# Patient Record
Sex: FEMALE | Race: BLACK OR AFRICAN AMERICAN | NOT HISPANIC OR LATINO | Employment: FULL TIME | URBAN - METROPOLITAN AREA
[De-identification: names, ages, dates, MRNs, and addresses within clinical notes are randomized per-mention and may not be internally consistent; named-entity substitution may affect disease eponyms.]

---

## 2017-07-26 ENCOUNTER — TELEPHONE (OUTPATIENT)
Dept: PULMONOLOGY | Facility: HOSPICE | Age: 37
End: 2017-07-26

## 2017-07-26 DIAGNOSIS — R06.00 DYSPNEA, UNSPECIFIED TYPE: ICD-10-CM

## 2018-01-04 ENCOUNTER — HOSPITAL ENCOUNTER (OUTPATIENT)
Dept: RADIOLOGY | Facility: MEDICAL CENTER | Age: 38
End: 2018-01-04

## 2022-06-08 ENCOUNTER — TELEPHONE (OUTPATIENT)
Dept: SCHEDULING | Facility: IMAGING CENTER | Age: 42
End: 2022-06-08

## 2022-06-08 RX ORDER — FLUTICASONE PROPIONATE 50 MCG
1 SPRAY, SUSPENSION (ML) NASAL DAILY
COMMUNITY
Start: 2022-06-03 | End: 2022-06-09

## 2022-06-08 RX ORDER — FEXOFENADINE HCL 180 MG/1
180 TABLET ORAL DAILY
COMMUNITY
Start: 2022-06-03 | End: 2022-06-09

## 2022-06-08 RX ORDER — PREDNISONE 20 MG/1
TABLET ORAL
COMMUNITY
Start: 2022-02-21 | End: 2022-06-09

## 2022-06-08 RX ORDER — BENZONATATE 100 MG/1
100 CAPSULE ORAL
COMMUNITY
Start: 2022-05-08 | End: 2022-06-09

## 2022-06-08 RX ORDER — IRON,CARBONYL/ASCORBIC ACID 100-250 MG
TABLET ORAL
COMMUNITY
End: 2022-06-09

## 2022-06-08 RX ORDER — PHENAZOPYRIDINE HYDROCHLORIDE 200 MG/1
200 TABLET, FILM COATED ORAL
COMMUNITY
Start: 2022-05-08 | End: 2022-06-09

## 2022-06-08 RX ORDER — MONTELUKAST SODIUM 10 MG/1
10 TABLET ORAL DAILY
COMMUNITY
Start: 2022-03-09 | End: 2022-06-09

## 2022-06-09 ENCOUNTER — OFFICE VISIT (OUTPATIENT)
Dept: MEDICAL GROUP | Facility: MEDICAL CENTER | Age: 42
End: 2022-06-09
Attending: INTERNAL MEDICINE
Payer: MEDICAID

## 2022-06-09 VITALS
SYSTOLIC BLOOD PRESSURE: 128 MMHG | WEIGHT: 293 LBS | BODY MASS INDEX: 41.95 KG/M2 | TEMPERATURE: 97.9 F | HEIGHT: 70 IN | OXYGEN SATURATION: 96 % | HEART RATE: 84 BPM | DIASTOLIC BLOOD PRESSURE: 88 MMHG | RESPIRATION RATE: 20 BRPM

## 2022-06-09 DIAGNOSIS — Z91.09 ENVIRONMENTAL ALLERGIES: ICD-10-CM

## 2022-06-09 DIAGNOSIS — L73.2 HYDRADENITIS: ICD-10-CM

## 2022-06-09 DIAGNOSIS — F33.1 MODERATE EPISODE OF RECURRENT MAJOR DEPRESSIVE DISORDER (HCC): ICD-10-CM

## 2022-06-09 DIAGNOSIS — K21.9 GASTROESOPHAGEAL REFLUX DISEASE WITHOUT ESOPHAGITIS: ICD-10-CM

## 2022-06-09 DIAGNOSIS — E23.6 PITUITARY MASS (HCC): ICD-10-CM

## 2022-06-09 DIAGNOSIS — Z13.1 SCREENING FOR DIABETES MELLITUS: ICD-10-CM

## 2022-06-09 DIAGNOSIS — R06.02 SHORTNESS OF BREATH: ICD-10-CM

## 2022-06-09 DIAGNOSIS — Z13.220 SCREENING, LIPID: ICD-10-CM

## 2022-06-09 DIAGNOSIS — J45.40 MODERATE PERSISTENT ASTHMA WITHOUT COMPLICATION: ICD-10-CM

## 2022-06-09 DIAGNOSIS — D50.9 IRON DEFICIENCY ANEMIA, UNSPECIFIED IRON DEFICIENCY ANEMIA TYPE: ICD-10-CM

## 2022-06-09 PROBLEM — J32.9 CHRONIC SINUSITIS: Status: ACTIVE | Noted: 2022-03-09

## 2022-06-09 PROBLEM — I10 HYPERTENSION: Status: ACTIVE | Noted: 2022-03-24

## 2022-06-09 PROCEDURE — 99213 OFFICE O/P EST LOW 20 MIN: CPT | Performed by: INTERNAL MEDICINE

## 2022-06-09 PROCEDURE — 99204 OFFICE O/P NEW MOD 45 MIN: CPT | Performed by: INTERNAL MEDICINE

## 2022-06-09 RX ORDER — OMEPRAZOLE 20 MG/1
20 TABLET, DELAYED RELEASE ORAL DAILY
Qty: 28 TABLET | Refills: 3 | Status: SHIPPED | OUTPATIENT
Start: 2022-06-09 | End: 2022-06-30

## 2022-06-09 RX ORDER — AZELASTINE HYDROCHLORIDE 137 UG/1
SPRAY, METERED NASAL
COMMUNITY
Start: 2022-04-26 | End: 2022-06-09

## 2022-06-09 RX ORDER — IPRATROPIUM BROMIDE 21 UG/1
SPRAY, METERED NASAL
COMMUNITY
Start: 2022-04-26 | End: 2022-06-09

## 2022-06-09 RX ORDER — DEXAMETHASONE 4 MG/1
TABLET ORAL
COMMUNITY
Start: 2022-05-02 | End: 2022-06-09

## 2022-06-09 RX ORDER — NITROFURANTOIN MACROCRYSTALS 100 MG/1
CAPSULE ORAL
COMMUNITY
Start: 2022-05-08 | End: 2022-06-09

## 2022-06-09 RX ORDER — ALBUTEROL SULFATE 90 UG/1
AEROSOL, METERED RESPIRATORY (INHALATION)
COMMUNITY
End: 2022-06-09

## 2022-06-09 NOTE — ASSESSMENT & PLAN NOTE
"She is currently seeing an allergist and has seen numerous allergist in the past.  States that she has had skin testing done and was allergic to \"66 out of the 72 things they tested\" with large reactions that lasted for a week.  She is not currently on any antihistamine therapy.  She has been given Allegra, Singulair, and Flonase recently but she has decided not to take these medications.  "

## 2022-06-09 NOTE — LETTER
[email or web address]  Leticia Houser M.D.  [address]  [place] [address]  Fax: [phone]   Authorization for Release/Disclosure of   Protected Health Information   Name: ERNESTO RAMOS : 1980 SSN: [id number]   Address: [address]  [address]  [place] [address] Phone:    [phone] (home)    I authorize the entity listed below to release/disclose the PHI below to:   [facility]/Leticia Houser M.D. and Leticia Houser M.D.    [facility]    Address   City, State, [facility]   Phone:      Fax:     Reason for request: continuity of care   Information to be released:    [  ] LAST COLONOSCOPY,  including any PATH REPORT and follow-up  [  ] LAST FIT/COLOGUARD RESULT [  ] LAST DEXA  [  ] LAST MAMMOGRAM  [  ] LAST PAP  [  ] LAST LABS [  ] RETINA EXAM REPORT  [  ] IMMUNIZATION RECORDS  [  ] Release all info      [  ] Check here and initial the line next to each item to release ALL health information INCLUDING  _____ Care and treatment for drug and / or alcohol abuse  _____ HIV testing, infection status, or AIDS  _____ Genetic Testing    DATES OF SERVICE OR TIME PERIOD TO BE DISCLOSED: _____________  I understand and acknowledge that:  * This Authorization may be revoked at any time by you in writing, except if your health information has already been used or disclosed.  * Your health information that will be used or disclosed as a result of you signing this authorization could be re-disclosed by the recipient. If this occurs, your re-disclosed health information may no longer be protected by State or Federal laws.  * You may refuse to sign this Authorization. Your refusal will not affect your ability to obtain treatment.  * This Authorization becomes effective upon signing and will  on (date) __________.      If no date is indicated, this Authorization will  one (1) year from the signature date.    Name: Ernesto Ramos    Signature:   Date:     2022       PLEASE FAX REQUESTED RECORDS BACK TO:  [phone]

## 2022-06-09 NOTE — ASSESSMENT & PLAN NOTE
She recently had a CT head done at [place] on 6/3/2022 which showed a 2.5 cm mixed density mass within and expanding the sella.  Radiology recommended a pituitary protocol MRI for follow-up.  She states that she has had worsening headaches over the past 6 months.  She believes she had a CT scan done in [place] when the headaches first started 6 months ago and she was told there was no mass so she thinks symptoms have developed over that time course.  States that she has not had a menstrual cycle since February.  Reports 150 pounds of weight gain over the course of several years.

## 2022-06-09 NOTE — ASSESSMENT & PLAN NOTE
She reports a history of depression which has waxed and waned in terms of severity.  While she was in isolation during the pandemic it got very severe and she was having suicidal ideation on a regular basis.  She now denies this.  In the past, she has worked with a therapist but she has not seen anyone currently.  Her mother and grandmother both passed away in the last year and this has been especially hard on her.

## 2022-06-09 NOTE — PROGRESS NOTES
"Ernesto Ramos is a 41 y.o. female here for pituitary mass, breathing difficulty, est care  HPI:  [place] PCP out of state ([place])  Pituitary mass (HCC)  She recently had a CT head done at [place] on 6/3/2022 which showed a 2.5 cm mixed density mass within and expanding the sella.  Radiology recommended a pituitary protocol MRI for follow-up.  She states that she has had worsening headaches over the past 6 months.  She believes she had a CT scan done in [place] when the headaches first started 6 months ago and she was told there was no mass so she thinks symptoms have developed over that time course.  States that she has not had a menstrual cycle since February.  Reports 150 pounds of weight gain over the course of several years.    Environmental allergies  She is currently seeing an allergist and has seen numerous allergist in the past.  States that she has had skin testing done and was allergic to \"66 out of the 72 things they tested\" with large reactions that lasted for a week.  She is not currently on any antihistamine therapy.  She has been given Allegra, Singulair, and Flonase recently but she has decided not to take these medications.    Moderate episode of recurrent major depressive disorder (HCC)  She reports a history of depression which has waxed and waned in terms of severity.  While she was in isolation during the pandemic it got very severe and she was having suicidal ideation on a regular basis.  She now denies this.  In the past, she has worked with a therapist but she has not seen anyone currently.  Her mother and grandmother both passed away in the last year and this has been especially hard on her.    Moderate persistent asthma without complication  She reports that she was diagnosed with possible asthma approximately 10 years ago.  States that she stated an air B&B and was bit by some sort of insect.  It left a large welt on her neck and her knee.  About 4 days later she started to have " increasing shortness of breath.  Eventually she went to the emergency room and they told her she had asthma.  She had never had this diagnosis as a child and she wonders if it was related to the insect bite.  Since then she has had worsening trouble breathing.  She has seen numerous pulmonologist in various states, most recently through [place].  States that they prescribed her various inhalers but she does not get much relief.  Albuterol just makes her blood pressure go up and she feels jittery with it but she does not get improvement in breathing.  She is currently using Qvar.  She has had frequent exacerbations of her breathing requiring prednisone.  States that she has had pulmonary function tests checked on a variety of occasions.  She most recently had them done at [facility].  She is unsure of the results.  She does not feel like she has adequately responded to any inhaler she has tried although she does not remember all of the names.  She had hives with the last inhaler prescribed by her pulmonologist.  She has had chest x-rays but the last was done in January.  She has never had any more detailed lung imaging.  She denies fevers and chills but she has a productive cough most of the time.    Shortness of breath  She reports significant exertional shortness of breath.  See discussion of asthma below.    Hydradenitis  She reports a diagnosis of hidradenitis in the past.  She had surgery on the left axilla to have her sweat glands and skin removed years ago and she reports that her hidradenitis went into remission however she now has problems with lesions in the groin region.    Gastroesophageal reflux disease without esophagitis  She reports consistent heartburn especially at bedtime when she lays down.  She recently purchased some Mylanta but she has not been taking any antacids.  She denies nausea, vomiting, melena, hematochezia.    Current medicines (including changes today)  Current Outpatient  Medications   Medication Sig Dispense Refill   • omeprazole (PRILOSEC OTC) 20 MG tablet Take 1 Tablet by mouth every day. 28 Tablet 3   • TURMERIC CURCUMIN PO Take  by mouth.     • beclomethasone (QVAR) 80 MCG/ACT inhaler Inhale 1 Puff.       No current facility-administered medications for this visit.     She  has a past medical history of Allergy, Asthma, Chronic sinusitis, GERD (gastroesophageal reflux disease), Head ache, Hydradenitis, and Obesity.  She  has a past surgical history that includes other.  Social History     Tobacco Use   • Smoking status: Never Smoker   • Smokeless tobacco: Never Used   Vaping Use   • Vaping Use: Never used   Substance Use Topics   • Alcohol use: Not Currently   • Drug use: Never     Social History     Social History Narrative   • Not on file     Family History   Problem Relation Age of Onset   • Hypertension Mother    • Diabetes Mother    • Cancer Mother 40        thyroid   • Stroke Mother    • Hypertension Father    • Breast Cancer Sister 48   • Heart Disease Neg Hx           Objective:     Vitals:    06/09/22 0931   BP: 128/88   Pulse: 84   Resp: 20   Temp: 36.6 °C (97.9 °F)   SpO2: 96%     Body mass index is 58.69 kg/m².  Physical Exam:    Constitutional: Alert, no distress.  Skin: Warm, dry, good turgor, no rashes in visible areas.  Eye: Equal, round and reactive, conjunctiva clear, lids normal.  ENMT: Lips without lesions, good dentition, oropharynx clear, TM's clear bilaterally.  Neck: Trachea midline, no masses, no thyromegaly. No cervical or supraclavicular lymphadenopathy.  Respiratory: Unlabored respiratory effort, diffuse expiratory wheezes in all lung fields b/l  Cardiovascular: Regular rate and rhythm, no murmurs appreciated, no lower extremity edema.  Abdomen: Soft, obese, non-tender, no masses, no hepatosplenomegaly.  Psych: Alert and oriented x3, depressed mood, tearful during interview        Assessment and Plan:   The following treatment plan was discussed    1.  Pituitary mass (HCC)  New diagnosis for patient.  We will obtain an MRI pituitary protocol for further characterization.  We discussed blood testing below especially in light of her being amenorrheic for about 4 months.  - MR-BRAIN PITUITARY W/WO; Future  - FSH/LH; Future  - PROLACTIN; Future  - TSH WITH REFLEX TO FT4; Future  - Comp Metabolic Panel; Future    2. Hydradenitis  Did not discuss in detail today however per patient it is currently manageable.  We will continue to monitor.    3. Environmental allergies  Severe although she is not on treatment.  She continues to follow-up with her allergist.    4. Gastroesophageal reflux disease without esophagitis  Uncontrolled, will start on Prilosec as below  - omeprazole (PRILOSEC OTC) 20 MG tablet; Take 1 Tablet by mouth every day.  Dispense: 28 Tablet; Refill: 3    5. Shortness of breath  Unclear etiology.  She does have a lot of wheezing on exam however she has been treated as an asthmatic for 10 years and has not improved.  She does not report improvement to typical bronchodilator or corticosteroid therapy.  I think she would benefit from more detailed lung imaging to better characterize her diagnosis and make sure we are not missing something especially given the acute onset of the symptoms and the absence of a history of asthma in childhood.  - CT-CHEST, HIGH RESOLUTION LUNG; Future    6. Screening for diabetes mellitus  - HEMOGLOBIN A1C; Future    7. Screening, lipid  - Lipid Profile; Future    8. Moderate persistent asthma without complication  See discussion above.  She will continue with her Qvar for now.  - CT-CHEST, HIGH RESOLUTION LUNG; Future    9. Moderate episode of recurrent major depressive disorder (HCC)  Uncontrolled.  We briefly discussed referral back to therapy.  Plan to meet again in 2 to 3 weeks and discuss in more detail.        Followup: Return in about 3 weeks (around 6/30/2022) for review labs, imaging, depression, breathing.

## 2022-06-09 NOTE — ASSESSMENT & PLAN NOTE
She reports a diagnosis of hidradenitis in the past.  She had surgery on the left axilla to have her sweat glands and skin removed years ago and she reports that her hidradenitis went into remission however she now has problems with lesions in the groin region.

## 2022-06-09 NOTE — ASSESSMENT & PLAN NOTE
She reports that she was diagnosed with possible asthma approximately 10 years ago.  States that she stated an air B&B and was bit by some sort of insect.  It left a large welt on her neck and her knee.  About 4 days later she started to have increasing shortness of breath.  Eventually she went to the emergency room and they told her she had asthma.  She had never had this diagnosis as a child and she wonders if it was related to the insect bite.  Since then she has had worsening trouble breathing.  She has seen numerous pulmonologist in various states, most recently through [place].  States that they prescribed her various inhalers but she does not get much relief.  Albuterol just makes her blood pressure go up and she feels jittery with it but she does not get improvement in breathing.  She is currently using Qvar.  She has had frequent exacerbations of her breathing requiring prednisone.  States that she has had pulmonary function tests checked on a variety of occasions.  She most recently had them done at [facility].  She is unsure of the results.  She does not feel like she has adequately responded to any inhaler she has tried although she does not remember all of the names.  She had hives with the last inhaler prescribed by her pulmonologist.  She has had chest x-rays but the last was done in January.  She has never had any more detailed lung imaging.  She denies fevers and chills but she has a productive cough most of the time.

## 2022-06-09 NOTE — ASSESSMENT & PLAN NOTE
She reports consistent heartburn especially at bedtime when she lays down.  She recently purchased some Mylanta but she has not been taking any antacids.  She denies nausea, vomiting, melena, hematochezia.

## 2022-06-22 ENCOUNTER — HOSPITAL ENCOUNTER (OUTPATIENT)
Dept: LAB | Facility: MEDICAL CENTER | Age: 42
End: 2022-06-22
Attending: INTERNAL MEDICINE
Payer: MEDICAID

## 2022-06-22 DIAGNOSIS — E23.6 PITUITARY MASS (HCC): ICD-10-CM

## 2022-06-22 DIAGNOSIS — D50.9 IRON DEFICIENCY ANEMIA, UNSPECIFIED IRON DEFICIENCY ANEMIA TYPE: ICD-10-CM

## 2022-06-22 DIAGNOSIS — Z13.220 SCREENING, LIPID: ICD-10-CM

## 2022-06-22 DIAGNOSIS — Z13.1 SCREENING FOR DIABETES MELLITUS: ICD-10-CM

## 2022-06-22 LAB
ALBUMIN SERPL BCP-MCNC: 4.5 G/DL (ref 3.2–4.9)
ALBUMIN/GLOB SERPL: 1.2 G/DL
ALP SERPL-CCNC: 98 U/L (ref 30–99)
ALT SERPL-CCNC: 14 U/L (ref 2–50)
ANION GAP SERPL CALC-SCNC: 15 MMOL/L (ref 7–16)
AST SERPL-CCNC: 20 U/L (ref 12–45)
BASOPHILS # BLD AUTO: 0.7 % (ref 0–1.8)
BASOPHILS # BLD: 0.06 K/UL (ref 0–0.12)
BILIRUB SERPL-MCNC: 0.5 MG/DL (ref 0.1–1.5)
BUN SERPL-MCNC: 13 MG/DL (ref 8–22)
CALCIUM SERPL-MCNC: 9.5 MG/DL (ref 8.5–10.5)
CHLORIDE SERPL-SCNC: 99 MMOL/L (ref 96–112)
CHOLEST SERPL-MCNC: 159 MG/DL (ref 100–199)
CO2 SERPL-SCNC: 24 MMOL/L (ref 20–33)
CREAT SERPL-MCNC: 1.16 MG/DL (ref 0.5–1.4)
EOSINOPHIL # BLD AUTO: 0.72 K/UL (ref 0–0.51)
EOSINOPHIL NFR BLD: 8.7 % (ref 0–6.9)
ERYTHROCYTE [DISTWIDTH] IN BLOOD BY AUTOMATED COUNT: 50.8 FL (ref 35.9–50)
EST. AVERAGE GLUCOSE BLD GHB EST-MCNC: 126 MG/DL
FERRITIN SERPL-MCNC: 51.9 NG/ML (ref 10–291)
FSH SERPL-ACNC: 4.8 MIU/ML
GFR SERPLBLD CREATININE-BSD FMLA CKD-EPI: 60 ML/MIN/1.73 M 2
GLOBULIN SER CALC-MCNC: 3.7 G/DL (ref 1.9–3.5)
GLUCOSE SERPL-MCNC: 105 MG/DL (ref 65–99)
HBA1C MFR BLD: 6 % (ref 4–5.6)
HCT VFR BLD AUTO: 42.7 % (ref 37–47)
HDLC SERPL-MCNC: 66 MG/DL
HGB BLD-MCNC: 14.5 G/DL (ref 12–16)
IMM GRANULOCYTES # BLD AUTO: 0.04 K/UL (ref 0–0.11)
IMM GRANULOCYTES NFR BLD AUTO: 0.5 % (ref 0–0.9)
IRON SATN MFR SERPL: 23 % (ref 15–55)
IRON SERPL-MCNC: 72 UG/DL (ref 40–170)
LDLC SERPL CALC-MCNC: 84 MG/DL
LH SERPL-ACNC: 2.1 IU/L
LYMPHOCYTES # BLD AUTO: 2.23 K/UL (ref 1–4.8)
LYMPHOCYTES NFR BLD: 26.9 % (ref 22–41)
MCH RBC QN AUTO: 27.8 PG (ref 27–33)
MCHC RBC AUTO-ENTMCNC: 34 G/DL (ref 33.6–35)
MCV RBC AUTO: 81.8 FL (ref 81.4–97.8)
MONOCYTES # BLD AUTO: 0.52 K/UL (ref 0–0.85)
MONOCYTES NFR BLD AUTO: 6.3 % (ref 0–13.4)
NEUTROPHILS # BLD AUTO: 4.72 K/UL (ref 2–7.15)
NEUTROPHILS NFR BLD: 56.9 % (ref 44–72)
NRBC # BLD AUTO: 0 K/UL
NRBC BLD-RTO: 0 /100 WBC
PLATELET # BLD AUTO: 343 K/UL (ref 164–446)
PMV BLD AUTO: 10.8 FL (ref 9–12.9)
POTASSIUM SERPL-SCNC: 3.8 MMOL/L (ref 3.6–5.5)
PROLACTIN SERPL-MCNC: 40.2 NG/ML (ref 2.8–26)
PROT SERPL-MCNC: 8.2 G/DL (ref 6–8.2)
RBC # BLD AUTO: 5.22 M/UL (ref 4.2–5.4)
SODIUM SERPL-SCNC: 138 MMOL/L (ref 135–145)
TIBC SERPL-MCNC: 313 UG/DL (ref 250–450)
TRIGL SERPL-MCNC: 46 MG/DL (ref 0–149)
TSH SERPL DL<=0.005 MIU/L-ACNC: 2.93 UIU/ML (ref 0.38–5.33)
UIBC SERPL-MCNC: 241 UG/DL (ref 110–370)
WBC # BLD AUTO: 8.3 K/UL (ref 4.8–10.8)

## 2022-06-22 PROCEDURE — 83550 IRON BINDING TEST: CPT

## 2022-06-22 PROCEDURE — 83001 ASSAY OF GONADOTROPIN (FSH): CPT

## 2022-06-22 PROCEDURE — 36415 COLL VENOUS BLD VENIPUNCTURE: CPT

## 2022-06-22 PROCEDURE — 83540 ASSAY OF IRON: CPT

## 2022-06-22 PROCEDURE — 80053 COMPREHEN METABOLIC PANEL: CPT

## 2022-06-22 PROCEDURE — 84443 ASSAY THYROID STIM HORMONE: CPT

## 2022-06-22 PROCEDURE — 83036 HEMOGLOBIN GLYCOSYLATED A1C: CPT

## 2022-06-22 PROCEDURE — 80061 LIPID PANEL: CPT

## 2022-06-22 PROCEDURE — 82728 ASSAY OF FERRITIN: CPT

## 2022-06-22 PROCEDURE — 83002 ASSAY OF GONADOTROPIN (LH): CPT

## 2022-06-22 PROCEDURE — 84146 ASSAY OF PROLACTIN: CPT

## 2022-06-22 PROCEDURE — 85025 COMPLETE CBC W/AUTO DIFF WBC: CPT

## 2022-06-23 DIAGNOSIS — D35.2 PROLACTINOMA (HCC): ICD-10-CM

## 2022-06-26 ENCOUNTER — E-CONSULT (OUTPATIENT)
Dept: ENDOCRINOLOGY | Facility: MEDICAL CENTER | Age: 42
End: 2022-06-26
Payer: MEDICAID

## 2022-06-26 DIAGNOSIS — E03.8 SECONDARY HYPOTHYROIDISM: ICD-10-CM

## 2022-06-26 DIAGNOSIS — Z71.9 ENCOUNTER FOR CONSULTATION: ICD-10-CM

## 2022-06-26 DIAGNOSIS — E23.6 PITUITARY MASS (HCC): ICD-10-CM

## 2022-06-27 NOTE — PROGRESS NOTES
E-Consult Response     After careful review of the patient's information available in the medical record, the following are my findings and recommendations:    Reason for consult: Recommendations for patient with hyperprolactinemia    Summary of data reviewed: In summary this is a 41-year-old female born on July 20, 1980    According to the EP records from June 9, 2022 the patient has a 2.5 cm mixed density mass expanding within the sella.  She has not had a pituitary MRI.  She has had headaches for the past 6 months.      Her labs on June 21, 2022 show a FSH of 4.8 and LH of 2.1 prolactin of 40.0 TSH of 2.9.      Recommendations: Strongly recommend getting pituitary MRI soon as possible.    The prolactin elevation is most likely secondary to mass-effect on the pituitary stalk thereby disrupting the physiologic hypothalamic released - dopamine inhibition of prolactin secretion AKA stalk effect.    The patient likely does not have a prolactinoma as typically with prolactinomas there is a close correlation between the size of the prolactin producing tumor and actual prolactin levels.      In this case patient reportedly had a 2.5 cm pituitary mass but the prolactin is minimally elevated at 40.  Correlation between the 2 data points is poor and again most likely the prolactin elevation is due to to stalk effect.  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    As such treatment of a dopamine agonist is not recommended.  Further work-up is recommended for a pituitary mass measuring 2.5 cm.        Recommend measuring ACTH and cortisol, free T4, IGF-I, concomitant LH and FSH and estradiol levels for this female patient.  Also recommend obtaining 24 urine cortisol and 24 urine creatinine.    Again recommend pituitary dedicated MRI, recommend visual field testing.  This patient need pituitary surgery if there is evidence of optic chiasm impingement.  Again do  not recommend dopamine agonist therapy as there is a logical explanation for the mild prolactin elevation.  Ultimately this will be proven by the results of imaging if there is evidence of deviation of the pituitary stalk caused by the pituitary tumor.          E-Consult Time: 31 minutes were spent with >50% of the total time spent discussing plan of care with requesting physician (Use code [id number])    Norman Berman M.D.

## 2022-06-29 ENCOUNTER — HOSPITAL ENCOUNTER (OUTPATIENT)
Dept: RADIOLOGY | Facility: MEDICAL CENTER | Age: 42
End: 2022-06-29
Attending: INTERNAL MEDICINE
Payer: MEDICAID

## 2022-06-29 DIAGNOSIS — R06.02 SHORTNESS OF BREATH: ICD-10-CM

## 2022-06-29 DIAGNOSIS — J45.40 MODERATE PERSISTENT ASTHMA WITHOUT COMPLICATION: ICD-10-CM

## 2022-06-29 DIAGNOSIS — E23.6 PITUITARY MASS (HCC): ICD-10-CM

## 2022-06-29 PROCEDURE — A9576 INJ PROHANCE MULTIPACK: HCPCS | Performed by: INTERNAL MEDICINE

## 2022-06-29 PROCEDURE — 71250 CT THORAX DX C-: CPT

## 2022-06-29 PROCEDURE — 70553 MRI BRAIN STEM W/O & W/DYE: CPT

## 2022-06-29 PROCEDURE — 700117 HCHG RX CONTRAST REV CODE 255: Performed by: INTERNAL MEDICINE

## 2022-06-29 RX ADMIN — GADOTERIDOL 30 ML: 279.3 INJECTION, SOLUTION INTRAVENOUS at 11:07

## 2022-06-30 ENCOUNTER — OFFICE VISIT (OUTPATIENT)
Dept: MEDICAL GROUP | Facility: MEDICAL CENTER | Age: 42
End: 2022-06-30
Attending: INTERNAL MEDICINE
Payer: MEDICAID

## 2022-06-30 ENCOUNTER — HOSPITAL ENCOUNTER (OUTPATIENT)
Dept: LAB | Facility: MEDICAL CENTER | Age: 42
End: 2022-06-30
Attending: INTERNAL MEDICINE
Payer: MEDICAID

## 2022-06-30 VITALS
WEIGHT: 293 LBS | HEART RATE: 86 BPM | BODY MASS INDEX: 41.95 KG/M2 | RESPIRATION RATE: 16 BRPM | HEIGHT: 70 IN | SYSTOLIC BLOOD PRESSURE: 144 MMHG | DIASTOLIC BLOOD PRESSURE: 90 MMHG | TEMPERATURE: 97.1 F | OXYGEN SATURATION: 98 %

## 2022-06-30 DIAGNOSIS — R06.02 SHORTNESS OF BREATH: ICD-10-CM

## 2022-06-30 DIAGNOSIS — E23.6 PITUITARY MASS (HCC): ICD-10-CM

## 2022-06-30 DIAGNOSIS — D72.10 EOSINOPHILIA, UNSPECIFIED TYPE: ICD-10-CM

## 2022-06-30 DIAGNOSIS — R09.89 PULMONARY AIR TRAPPING: ICD-10-CM

## 2022-06-30 DIAGNOSIS — R06.2 WHEEZING: ICD-10-CM

## 2022-06-30 PROBLEM — D35.2 PROLACTINOMA (HCC): Status: RESOLVED | Noted: 2022-06-23 | Resolved: 2022-06-30

## 2022-06-30 LAB
CORTIS SERPL-MCNC: 11 UG/DL (ref 0–23)
ESTRADIOL SERPL-MCNC: 31.7 PG/ML
FSH SERPL-ACNC: 4.6 MIU/ML
LH SERPL-ACNC: 2.4 IU/L
T4 FREE SERPL-MCNC: 0.55 NG/DL (ref 0.93–1.7)

## 2022-06-30 PROCEDURE — 82024 ASSAY OF ACTH: CPT

## 2022-06-30 PROCEDURE — 99213 OFFICE O/P EST LOW 20 MIN: CPT | Performed by: INTERNAL MEDICINE

## 2022-06-30 PROCEDURE — 82533 TOTAL CORTISOL: CPT

## 2022-06-30 PROCEDURE — 82670 ASSAY OF TOTAL ESTRADIOL: CPT

## 2022-06-30 PROCEDURE — 83001 ASSAY OF GONADOTROPIN (FSH): CPT

## 2022-06-30 PROCEDURE — 83002 ASSAY OF GONADOTROPIN (LH): CPT

## 2022-06-30 PROCEDURE — 86036 ANCA SCREEN EACH ANTIBODY: CPT

## 2022-06-30 PROCEDURE — 84439 ASSAY OF FREE THYROXINE: CPT

## 2022-06-30 PROCEDURE — 99214 OFFICE O/P EST MOD 30 MIN: CPT | Performed by: INTERNAL MEDICINE

## 2022-06-30 PROCEDURE — 84305 ASSAY OF SOMATOMEDIN: CPT

## 2022-06-30 PROCEDURE — 36415 COLL VENOUS BLD VENIPUNCTURE: CPT

## 2022-06-30 PROCEDURE — 82103 ALPHA-1-ANTITRYPSIN TOTAL: CPT

## 2022-06-30 PROCEDURE — 83516 IMMUNOASSAY NONANTIBODY: CPT

## 2022-06-30 RX ORDER — TIOTROPIUM BROMIDE INHALATION SPRAY 3.12 UG/1
5 SPRAY, METERED RESPIRATORY (INHALATION) DAILY
Qty: 4 G | Refills: 2 | Status: ON HOLD | OUTPATIENT
Start: 2022-06-30 | End: 2022-08-11

## 2022-06-30 RX ORDER — PREDNISONE 20 MG/1
TABLET ORAL
Qty: 11 TABLET | Refills: 0 | Status: ON HOLD | OUTPATIENT
Start: 2022-06-30 | End: 2022-08-11

## 2022-06-30 ASSESSMENT — FIBROSIS 4 INDEX: FIB4 SCORE: 0.67

## 2022-06-30 NOTE — ASSESSMENT & PLAN NOTE
She presents today for follow-up of her imaging studies.  We discussed the results of her MRI pituitary.  She states that she has had intermittent episodes of blurred vision that come and go starting back in December but they were also associated with a lot of stress.  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  We reviewed recommendations by Dr. Betancourt of endocrinology for additional labs as well as potentially consultation with neurosurgeon and the importance of visual field testing.  She states that she has thought a lot about having the mass removed and she would like to proceed with this.

## 2022-06-30 NOTE — ASSESSMENT & PLAN NOTE
Her labs show eosinophilia however she has severe allergies.  Given her difficulty breathing, we also discussed the possibility of Wegener's granulomatosis although I think this is unlikely, as well as sarcoidosis.  In the setting of her normal imaging recently, pulmonary sarcoidosis is very unlikely.  We have requested records from her allergy provider ([facility]).

## 2022-06-30 NOTE — PROGRESS NOTES
Subjective:   Ernesto Ramos is a 41 y.o. female here today for f/u results    Pituitary mass (HCC)  She presents today for follow-up of her imaging studies.  We discussed the results of her MRI pituitary.  She states that she has had intermittent episodes of blurred vision that come and go starting back in December but they were also associated with a lot of stress.  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  We reviewed recommendations by Dr. Betancourt of endocrinology for additional labs as well as potentially consultation with neurosurgeon and the importance of visual field testing.  She states that she has thought a lot about having the mass removed and she would like to proceed with this.    Pulmonary air trapping  She continues to report significant difficulty with breathing and wheezing.  She had a high-resolution CT of the lungs done which showed some pulmonary air trapping but was otherwise unremarkable.  She feels like her symptoms are worsening and she is having more trouble breathing again and she is requesting another round of steroids.  Of note, she reports trying Trelegy which caused her hives and also Advair which caused her multiple joint pain.  Albuterol has not been very effective for her.    Eosinophilia  Her labs show eosinophilia however she has severe allergies.  Given her difficulty breathing, we also discussed the possibility of Wegener's granulomatosis although I think this is unlikely, as well as sarcoidosis.  In the setting of her normal imaging recently, pulmonary sarcoidosis is very unlikely.  We have requested records from her allergy provider ([facility]).       Current medicines (including changes today)  Current Outpatient Medications   Medication Sig Dispense Refill   • tiotropium (SPIRIVA RESPIMAT) 2.5 mcg/Act Aero Soln Inhale 2 Inhalation every day. 4 g 2   • predniSONE  (DELTASONE) 20 MG Tab 2 tabs PO daily x 3 days then 1 tab PO daily x 3 days then 1/2 tab PO daily x 3 days 11 Tablet 0   • TURMERIC CURCUMIN PO Take  by mouth.       No current facility-administered medications for this visit.     She  has a past medical history of Allergy, Asthma, Chronic sinusitis, GERD (gastroesophageal reflux disease), Head ache, Hydradenitis, and Obesity.         Objective:     Vitals:    06/30/22 0831   BP: (!) 144/90   Pulse: 86   Resp: 16   Temp: 36.2 °C (97.1 °F)   SpO2: 98%     Body mass index is 58.83 kg/m².   Physical Exam:  Constitutional: Alert, no distress.  Skin: Warm, dry, good turgor, no rashes in visible areas.  Eye: Equal, round and reactive, conjunctiva clear, lids normal.  Psych: Alert and oriented x3, normal affect and mood.      Results and Imaging:   Hospital Outpatient Visit on 06/22/2022   Component Date Value Ref Range Status   • Luteinizing Hormone 06/22/2022 2.1  IU/L Final    Comment: LUTEINIZING HORMONE REFERENCE RANGES:  Female               Male  >17 yrs                                      1.7 - 8.6 IU/L  Follicular             2.4 - 12.6 IU/L  Mid-Cycle             14.0 - 95.6 IU/L  Luteal                 1.0 - 11.4 IU/L  Postmenopausal         7.7 - 58.5 IU/L  -----------------------------------------------------------  Jorge Stage I:         0.0 - 9.3  IU/L      0.0 - 1.0 IU/L  Jorge Stage II:        0.0 - 16.0 IU/L      0.0 - 3.6 IU/L  Jorge Stage III:       0.0 - 23.0 IU/L      0.2 - 6.4 IU/L  Jorge Stage IV:        0.0 - 19.1 IU/L      0.9 - 8.3 IU/L     • Follicle Stimulating Hormone 06/22/2022 4.8  mIU/mL Final    Comment: FOLLICULAR STIMULATING HORMONE REFERENCE RANGE  Female               Male  >17 yrs  Follicular             3.5 - 12.5  Mid-Cycle              4.7 - 21.5  Luteal                 1.7 - 7.7  Postmenopausal         25.8 - 134.8  ------------------------------------------------------  Jorge Stage I:         0.6 - 8.4           0.3 -  2.9  Jorge Stage II:        0.6 - 8.9           0.5 - 4.8  Jorge Stage III:       0.5 - 8.9           1.0 - 6.4  Jorge Stage IV:        0.7 - 9.3           1.0 - 8.1     • Prolactin 06/22/2022 40.20 (A) 2.80 - 26.00 ng/mL Final   • TSH 06/22/2022 2.930  0.380 - 5.330 uIU/mL Final    Comment: Reference Range:    Pregnant Females, 1st Trimester  0.050-3.700  Pregnant Females, 2nd Trimester  0.310-4.350  Pregnant Females, 3rd Trimester  0.410-5.180     • Glycohemoglobin 06/22/2022 6.0 (A) 4.0 - 5.6 % Final    Comment: Increased risk for diabetes:  5.7 -6.4%  Diabetes:  >6.4%  Glycemic control for adults with diabetes:  <7.0%    The above interpretations are per ADA guidelines.  Diagnosis  of diabetes mellitus on the basis of elevated Hemoglobin A1c  should be confirmed by repeating the Hb A1c test.     • Est Avg Glucose 06/22/2022 126  mg/dL Final    Comment: The eAG calculation is based on the A1c-Derived Daily Glucose  (ADAG) study.  See the ADA's website for additional information.     • Cholesterol,Tot 06/22/2022 159  100 - 199 mg/dL Final   • Triglycerides 06/22/2022 46  0 - 149 mg/dL Final   • HDL 06/22/2022 66  >=40 mg/dL Final   • LDL 06/22/2022 84  <100 mg/dL Final   • Sodium 06/22/2022 138  135 - 145 mmol/L Final   • Potassium 06/22/2022 3.8  3.6 - 5.5 mmol/L Final   • Chloride 06/22/2022 99  96 - 112 mmol/L Final   • Co2 06/22/2022 24  20 - 33 mmol/L Final   • Anion Gap 06/22/2022 15.0  7.0 - 16.0 Final   • Glucose 06/22/2022 105 (A) 65 - 99 mg/dL Final   • Bun 06/22/2022 13  8 - 22 mg/dL Final   • Creatinine 06/22/2022 1.16  0.50 - 1.40 mg/dL Final   • Calcium 06/22/2022 9.5  8.5 - 10.5 mg/dL Final   • AST(SGOT) 06/22/2022 20  12 - 45 U/L Final   • ALT(SGPT) 06/22/2022 14  2 - 50 U/L Final   • Alkaline Phosphatase 06/22/2022 98  30 - 99 U/L Final   • Total Bilirubin 06/22/2022 0.5  0.1 - 1.5 mg/dL Final   • Albumin 06/22/2022 4.5  3.2 - 4.9 g/dL Final   • Total Protein 06/22/2022 8.2  6.0 - 8.2 g/dL  Final   • Globulin 06/22/2022 3.7 (A) 1.9 - 3.5 g/dL Final   • A-G Ratio 06/22/2022 1.2  g/dL Final   • WBC 06/22/2022 8.3  4.8 - 10.8 K/uL Final   • RBC 06/22/2022 5.22  4.20 - 5.40 M/uL Final   • Hemoglobin 06/22/2022 14.5  12.0 - 16.0 g/dL Final   • Hematocrit 06/22/2022 42.7  37.0 - 47.0 % Final   • MCV 06/22/2022 81.8  81.4 - 97.8 fL Final   • MCH 06/22/2022 27.8  27.0 - 33.0 pg Final   • MCHC 06/22/2022 34.0  33.6 - 35.0 g/dL Final   • RDW 06/22/2022 50.8 (A) 35.9 - 50.0 fL Final   • Platelet Count 06/22/2022 343  164 - 446 K/uL Final   • MPV 06/22/2022 10.8  9.0 - 12.9 fL Final   • Neutrophils-Polys 06/22/2022 56.90  44.00 - 72.00 % Final   • Lymphocytes 06/22/2022 26.90  22.00 - 41.00 % Final   • Monocytes 06/22/2022 6.30  0.00 - 13.40 % Final   • Eosinophils 06/22/2022 8.70 (A) 0.00 - 6.90 % Final   • Basophils 06/22/2022 0.70  0.00 - 1.80 % Final   • Immature Granulocytes 06/22/2022 0.50  0.00 - 0.90 % Final   • Nucleated RBC 06/22/2022 0.00  /100 WBC Final   • Neutrophils (Absolute) 06/22/2022 4.72  2.00 - 7.15 K/uL Final    Includes immature neutrophils, if present.   • Lymphs (Absolute) 06/22/2022 2.23  1.00 - 4.80 K/uL Final   • Monos (Absolute) 06/22/2022 0.52  0.00 - 0.85 K/uL Final   • Eos (Absolute) 06/22/2022 0.72 (A) 0.00 - 0.51 K/uL Final   • Baso (Absolute) 06/22/2022 0.06  0.00 - 0.12 K/uL Final   • Immature Granulocytes (abs) 06/22/2022 0.04  0.00 - 0.11 K/uL Final   • NRBC (Absolute) 06/22/2022 0.00  K/uL Final   • Iron 06/22/2022 72  40 - 170 ug/dL Final   • Total Iron Binding 06/22/2022 313  250 - 450 ug/dL Final   • Unsat Iron Binding 06/22/2022 241  110 - 370 ug/dL Final   • % Saturation 06/22/2022 23  15 - 55 % Final   • Ferritin 06/22/2022 51.9  10.0 - 291.0 ng/mL Final   • GFR (CKD-EPI) 06/22/2022 60  >60 mL/min/1.73 m 2 Final    Comment: Effective 3/15/2022, estimated Glomerular Filtration Rate  is calculated using race neutral CKD-EPI 2021 equation  per NKF-ASN recommendations.        CT high resolution chest (6/29/22)  FINDINGS:  Lung volumes: Normal.     Lung parenchyma: No consolidation. No nodules or masses. No bronchiectasis or honeycombing. No architectural distortion. No reticulation. There are patchy areas of air trapping on expiratory images.     Pleura: No pleural fluid is identified.     Mediastinum and lymph nodes: No adenopathy is identified. Calcifications: Absent  Esophagus: Dilatation Absent. Wall thickening Absent Hiatal hernia Absent     Cardiac/vascular: Pulmonary artery: Main size 2.1 cm.  Aorta: Ascending aorta 3 cm. Calcifications: None  Coronary calcifications: None  Cardiac chambers: No significant chamber enlargement.  Pericardium: Small pericardial effusion.     Additional findings: Limited visualization of the upper abdomen is grossly unremarkable.     Mild degenerative changes are in the thoracic spine. No suspicious bony lesions.     IMPRESSION:     1.  Mild-to-moderate air trapping on expiratory images. Exam is otherwise unremarkable    MRI pituitary (6/29/22)  FINDINGS:  There is a 2.6 x 2 cm thin-walled cystic mass arising in and expanding the pituitary fossa. This mass extends suprasellar cistern and abuts the optic chiasm. Additionally this mass extends anteriorly into the right side of the sphenoid sinus. There is a   rim of irregular enhancing soft tissue in the base of this lesion. Additionally there is a hypointense fluid fluid level dependently in the cystic lesion. This cystic lesion is predominantly T1 hyperintense.     The cavernous sinuses show normal enhancement and configuration. Vascular flow voids in the juxtasellar carotid arteries are unremarkable.     The images of the whole brain show the calvaria to be unremarkable. There are no extraaxial fluid collections. There are no areas of abnormal signal or enhancement in the brain parenchyma. There are no mass effects or shift of midline structures. There   are no hemorrhagic lesions.     The  brainstem and posterior fossa structures are unremarkable.     Vascular flow voids in the vertebrobasilar and carotid arteries, [place], and dural venous sinuses are intact.     The paranasal sinuses moderate mucosal inflammatory changes in the ethmoid sinuses. There is small polypoid mucosal inflammatory change noted in the left maxillary sinus.     IMPRESSION:        1.  Large cystic pituitary macroadenoma expanding the pituitary fossa abutting the optic chiasm and extending into the right side of the sphenoid sinus likely a prolactinoma.     2.  Moderate mucosal inflammatory changes in the ethmoid and left maxillary sinuses.      Assessment and Plan:   The following treatment plan was discussed    1. Pituitary mass (HCC)  We discussed obtaining additional testing as recommended by endocrinology E consult.  I have also placed a referral to ophthalmology for visual field testing and neurosurgery to discuss removal.  We discussed that if any of her additional labs come back abnormal, I will place a referral to endocrinology at Dr. Betancourt's request.  - ACTH; Future  - CORTISOL; Future  - URINE CORTISOL 24 HR, ICMA; Future  - URINE CREATININE 24 HR; Future  - FREE THYROXINE; Future  - IGF-1  - FSH/LH; Future  - ESTRADIOL; Future  - Referral to Neurosurgery  - Referral to Ophthalmology    2. Shortness of breath  There is air trapping seen on her high-resolution CT but no obvious intrinsic lung disease.  We discussed a trial of Spiriva.  Because she feels like her breathing is significantly worse, I have done a prednisone taper for her as well but instructed her not to start until she does her pituitary labs so as not to affect her cortisol/ACTH.  Also discussed evaluating for alpha 1 antitrypsin deficiency and Wegener's granulomatosis  - ALPHA-1-ANTITRYPSIN; Future  - tiotropium (SPIRIVA RESPIMAT) 2.5 mcg/Act Aero Soln; Inhale 2 Inhalation every day.  Dispense: 4 g; Refill: 2  - ANTI-NEUTROPHIL CYTOPLASMIC  AB  - predniSONE (DELTASONE) 20 MG Tab; 2 tabs PO daily x 3 days then 1 tab PO daily x 3 days then 1/2 tab PO daily x 3 days  Dispense: 11 Tablet; Refill: 0    3. Pulmonary air trapping  - tiotropium (SPIRIVA RESPIMAT) 2.5 mcg/Act Aero Soln; Inhale 2 Inhalation every day.  Dispense: 4 g; Refill: 2  - ANTI-NEUTROPHIL CYTOPLASMIC AB  - predniSONE (DELTASONE) 20 MG Tab; 2 tabs PO daily x 3 days then 1 tab PO daily x 3 days then 1/2 tab PO daily x 3 days  Dispense: 11 Tablet; Refill: 0    4. Wheezing  - ANTI-NEUTROPHIL CYTOPLASMIC AB  - predniSONE (DELTASONE) 20 MG Tab; 2 tabs PO daily x 3 days then 1 tab PO daily x 3 days then 1/2 tab PO daily x 3 days  Dispense: 11 Tablet; Refill: 0    5. Eosinophilia, unspecified type  Most likely secondary to numerous allergies however given pulmonary symptoms we will obtain ANCA testing  - ANTI-NEUTROPHIL CYTOPLASMIC AB        Followup: Return if symptoms worsen or fail to improve.

## 2022-06-30 NOTE — LETTER
[facility]  Leticia Houser M.D.  [address]  [place] [address]  Fax: [phone]   Authorization for Release/Disclosure of   Protected Health Information   Name: ERNESTO RAMOS : 1980 SSN: [id number]   Address: [address]  [address]  [place] [address] Phone:    [phone] (home)    I authorize the entity listed below to release/disclose the PHI below to:   [facility]/Leticia Houser M.D. and Leticia Houser M.D.   Provider or Entity Name:  [facility]    Address   City, State, Zip   Phone:      Fax:     Reason for request: continuity of care   Information to be released:    [  ] LAST COLONOSCOPY,  including any PATH REPORT and follow-up  [  ] LAST FIT/COLOGUARD RESULT [  ] LAST DEXA  [  ] LAST MAMMOGRAM  [  ] LAST PAP  [  ] LAST LABS [  ] RETINA EXAM REPORT  [  ] IMMUNIZATION RECORDS  [  ] Release all info      [  ] Check here and initial the line next to each item to release ALL health information INCLUDING  _____ Care and treatment for drug and / or alcohol abuse  _____ HIV testing, infection status, or AIDS  _____ Genetic Testing    DATES OF SERVICE OR TIME PERIOD TO BE DISCLOSED: _____________  I understand and acknowledge that:  * This Authorization may be revoked at any time by you in writing, except if your health information has already been used or disclosed.  * Your health information that will be used or disclosed as a result of you signing this authorization could be re-disclosed by the recipient. If this occurs, your re-disclosed health information may no longer be protected by State or Federal laws.  * You may refuse to sign this Authorization. Your refusal will not affect your ability to obtain treatment.  * This Authorization becomes effective upon signing and will  on (date) __________.      If no date is indicated, this Authorization will  one (1) year from the signature date.    Name: Ernesto Ramos    Signature:   Date:     2022        PLEASE FAX REQUESTED RECORDS BACK TO: [phone]

## 2022-06-30 NOTE — ASSESSMENT & PLAN NOTE
She continues to report significant difficulty with breathing and wheezing.  She had a high-resolution CT of the lungs done which showed some pulmonary air trapping but was otherwise unremarkable.  She feels like her symptoms are worsening and she is having more trouble breathing again and she is requesting another round of steroids.  Of note, she reports trying Trelegy which caused her hives and also Advair which caused her multiple joint pain.  Albuterol has not been very effective for her.

## 2022-07-01 ENCOUNTER — HOSPITAL ENCOUNTER (OUTPATIENT)
Facility: MEDICAL CENTER | Age: 42
End: 2022-07-01
Attending: INTERNAL MEDICINE
Payer: MEDICAID

## 2022-07-01 DIAGNOSIS — E23.6 PITUITARY MASS (HCC): ICD-10-CM

## 2022-07-01 LAB
CREAT 24H UR-MSRATE: 1712 MG/24 HR (ref 800–1800)
CREAT UR-MCNC: 57.05 MG/DL
SPECIMEN VOL UR: 3000 ML

## 2022-07-01 PROCEDURE — 82530 CORTISOL FREE: CPT

## 2022-07-01 PROCEDURE — 82570 ASSAY OF URINE CREATININE: CPT

## 2022-07-01 PROCEDURE — 81050 URINALYSIS VOLUME MEASURE: CPT

## 2022-07-02 LAB
A1AT SERPL-MCNC: 156 MG/DL (ref 90–200)
ACTH PLAS-MCNC: 57.3 PG/ML (ref 7.2–63.3)

## 2022-07-03 LAB
IGF-I SERPL-MCNC: 128 NG/ML (ref 71–258)
IGF-I Z-SCORE SERPL: -0.4

## 2022-07-05 LAB
ANCA IFA PATTERN Q6048: NORMAL
ANCA IFA TITER Q6047: NORMAL
MYELOPEROXIDASE AB SER-ACNC: 0 AU/ML (ref 0–19)
PROTEINASE3 AB SER-ACNC: 3 AU/ML (ref 0–19)

## 2022-07-06 LAB
ANNOTATION COMMENT IMP: ABNORMAL
COLLECT DURATION TIME SPEC: 24 HRS
CORTIS F 24H UR HPLC-MCNC: 3.78 UG/L
CORTIS F 24H UR-MRATE: 11.3 UG/D
CORTIS F/CREAT 24H UR: 6.63 UG/G CRT
CREAT 24H UR-MCNC: 57 MG/DL
CREAT 24H UR-MRATE: 1710 MG/D (ref 700–1600)
SPECIMEN VOL ?TM UR: ABNORMAL ML

## 2022-07-11 RX ORDER — LEVOTHYROXINE SODIUM 0.05 MG/1
50 TABLET ORAL
Qty: 30 TABLET | Refills: 2 | Status: SHIPPED | OUTPATIENT
Start: 2022-07-11 | End: 2022-08-25 | Stop reason: SDUPTHER

## 2022-07-28 ENCOUNTER — PRE-ADMISSION TESTING (OUTPATIENT)
Dept: ADMISSIONS | Facility: MEDICAL CENTER | Age: 42
DRG: 614 | End: 2022-07-28
Attending: NEUROLOGICAL SURGERY
Payer: MEDICAID

## 2022-07-28 ENCOUNTER — HOSPITAL ENCOUNTER (OUTPATIENT)
Dept: RADIOLOGY | Facility: MEDICAL CENTER | Age: 42
DRG: 614 | End: 2022-07-28
Attending: NEUROLOGICAL SURGERY
Payer: MEDICAID

## 2022-07-28 DIAGNOSIS — Z01.811 PRE-OPERATIVE RESPIRATORY EXAMINATION: ICD-10-CM

## 2022-07-28 DIAGNOSIS — Z01.810 PRE-OPERATIVE CARDIOVASCULAR EXAMINATION: ICD-10-CM

## 2022-07-28 DIAGNOSIS — Z01.812 PRE-OPERATIVE LABORATORY EXAMINATION: ICD-10-CM

## 2022-07-28 LAB
ANION GAP SERPL CALC-SCNC: 13 MMOL/L (ref 7–16)
APTT PPP: 39.2 SEC (ref 24.7–36)
BASOPHILS # BLD AUTO: 0.6 % (ref 0–1.8)
BASOPHILS # BLD: 0.04 K/UL (ref 0–0.12)
BUN SERPL-MCNC: 15 MG/DL (ref 8–22)
CALCIUM SERPL-MCNC: 9.9 MG/DL (ref 8.5–10.5)
CHLORIDE SERPL-SCNC: 105 MMOL/L (ref 96–112)
CO2 SERPL-SCNC: 22 MMOL/L (ref 20–33)
CREAT SERPL-MCNC: 1.03 MG/DL (ref 0.5–1.4)
EKG IMPRESSION: NORMAL
EOSINOPHIL # BLD AUTO: 0.46 K/UL (ref 0–0.51)
EOSINOPHIL NFR BLD: 6.6 % (ref 0–6.9)
ERYTHROCYTE [DISTWIDTH] IN BLOOD BY AUTOMATED COUNT: 50.2 FL (ref 35.9–50)
GFR SERPLBLD CREATININE-BSD FMLA CKD-EPI: 69 ML/MIN/1.73 M 2
GLUCOSE SERPL-MCNC: 97 MG/DL (ref 65–99)
HCT VFR BLD AUTO: 40.9 % (ref 37–47)
HGB BLD-MCNC: 14.1 G/DL (ref 12–16)
IMM GRANULOCYTES # BLD AUTO: 0.03 K/UL (ref 0–0.11)
IMM GRANULOCYTES NFR BLD AUTO: 0.4 % (ref 0–0.9)
INR PPP: 1.2 (ref 0.87–1.13)
LYMPHOCYTES # BLD AUTO: 2.09 K/UL (ref 1–4.8)
LYMPHOCYTES NFR BLD: 30.2 % (ref 22–41)
MCH RBC QN AUTO: 28.5 PG (ref 27–33)
MCHC RBC AUTO-ENTMCNC: 34.5 G/DL (ref 33.6–35)
MCV RBC AUTO: 82.6 FL (ref 81.4–97.8)
MONOCYTES # BLD AUTO: 0.51 K/UL (ref 0–0.85)
MONOCYTES NFR BLD AUTO: 7.4 % (ref 0–13.4)
NEUTROPHILS # BLD AUTO: 3.79 K/UL (ref 2–7.15)
NEUTROPHILS NFR BLD: 54.8 % (ref 44–72)
NRBC # BLD AUTO: 0 K/UL
NRBC BLD-RTO: 0 /100 WBC
PLATELET # BLD AUTO: 297 K/UL (ref 164–446)
PMV BLD AUTO: 9.8 FL (ref 9–12.9)
POTASSIUM SERPL-SCNC: 3.7 MMOL/L (ref 3.6–5.5)
PROTHROMBIN TIME: 15.1 SEC (ref 12–14.6)
RBC # BLD AUTO: 4.95 M/UL (ref 4.2–5.4)
SODIUM SERPL-SCNC: 140 MMOL/L (ref 135–145)
WBC # BLD AUTO: 6.9 K/UL (ref 4.8–10.8)

## 2022-07-28 PROCEDURE — 36415 COLL VENOUS BLD VENIPUNCTURE: CPT

## 2022-07-28 PROCEDURE — 85025 COMPLETE CBC W/AUTO DIFF WBC: CPT

## 2022-07-28 PROCEDURE — 80048 BASIC METABOLIC PNL TOTAL CA: CPT

## 2022-07-28 PROCEDURE — 85730 THROMBOPLASTIN TIME PARTIAL: CPT

## 2022-07-28 PROCEDURE — 93005 ELECTROCARDIOGRAM TRACING: CPT

## 2022-07-28 PROCEDURE — 71046 X-RAY EXAM CHEST 2 VIEWS: CPT

## 2022-07-28 PROCEDURE — 85610 PROTHROMBIN TIME: CPT

## 2022-07-28 PROCEDURE — 93010 ELECTROCARDIOGRAM REPORT: CPT | Performed by: INTERNAL MEDICINE

## 2022-07-28 RX ORDER — BECLOMETHASONE DIPROPIONATE HFA 80 UG/1
2 AEROSOL, METERED RESPIRATORY (INHALATION) 2 TIMES DAILY
COMMUNITY
Start: 2022-07-12 | End: 2023-03-23 | Stop reason: SDUPTHER

## 2022-07-28 ASSESSMENT — FIBROSIS 4 INDEX: FIB4 SCORE: 0.69

## 2022-07-28 NOTE — OR NURSING
Pt is bloodless protocol.  Call to Dr. Mejia with regard to this, anesthesia aware.  Pt has major depression, anxiety, and panic attacks.  Woke up very restless with only surgery she had.  Pt requests Spiritual Team visit, and order placed for that in PAT

## 2022-08-10 NOTE — OR NURSING
Spoke with patient.  Informed her of preoperative Stealth CT imaging.  Stealth CT tentatively scheduled @0600 (verified by Li @ [facility]).  Patient aware of early arrival at 0500.

## 2022-08-11 ENCOUNTER — ANESTHESIA EVENT (OUTPATIENT)
Dept: SURGERY | Facility: MEDICAL CENTER | Age: 42
DRG: 614 | End: 2022-08-11
Payer: MEDICAID

## 2022-08-11 ENCOUNTER — APPOINTMENT (OUTPATIENT)
Dept: RADIOLOGY | Facility: MEDICAL CENTER | Age: 42
DRG: 614 | End: 2022-08-11
Attending: NEUROLOGICAL SURGERY
Payer: MEDICAID

## 2022-08-11 ENCOUNTER — ANESTHESIA (OUTPATIENT)
Dept: SURGERY | Facility: MEDICAL CENTER | Age: 42
DRG: 614 | End: 2022-08-11
Payer: MEDICAID

## 2022-08-11 ENCOUNTER — HOSPITAL ENCOUNTER (INPATIENT)
Facility: MEDICAL CENTER | Age: 42
LOS: 5 days | DRG: 614 | End: 2022-08-16
Attending: NEUROLOGICAL SURGERY | Admitting: NEUROLOGICAL SURGERY
Payer: MEDICAID

## 2022-08-11 DIAGNOSIS — G89.18 ACUTE POST-OPERATIVE PAIN: ICD-10-CM

## 2022-08-11 PROBLEM — D35.2 PITUITARY MACROADENOMA (HCC): Status: ACTIVE | Noted: 2022-08-11

## 2022-08-11 LAB
APTT PPP: 31.5 SEC (ref 24.7–36)
FUNGUS SPEC FUNGUS STN: NORMAL
GRAM STN SPEC: NORMAL
HCG UR QL: NEGATIVE
INR PPP: 1.16 (ref 0.87–1.13)
PATHOLOGY CONSULT NOTE: NORMAL
PROTHROMBIN TIME: 14.6 SEC (ref 12–14.6)
SIGNIFICANT IND 70042: NORMAL
SIGNIFICANT IND 70042: NORMAL
SITE SITE: NORMAL
SITE SITE: NORMAL
SOURCE SOURCE: NORMAL
SOURCE SOURCE: NORMAL

## 2022-08-11 PROCEDURE — 87075 CULTR BACTERIA EXCEPT BLOOD: CPT

## 2022-08-11 PROCEDURE — 160031 HCHG SURGERY MINUTES - 1ST 30 MINS LEVEL 5: Performed by: NEUROLOGICAL SURGERY

## 2022-08-11 PROCEDURE — 09SL8ZZ REPOSITION NASAL TURBINATE, VIA NATURAL OR ARTIFICIAL OPENING ENDOSCOPIC: ICD-10-PCS | Performed by: OTOLARYNGOLOGY

## 2022-08-11 PROCEDURE — 160035 HCHG PACU - 1ST 60 MINS PHASE I: Performed by: NEUROLOGICAL SURGERY

## 2022-08-11 PROCEDURE — 87205 SMEAR GRAM STAIN: CPT | Mod: 91

## 2022-08-11 PROCEDURE — 160009 HCHG ANES TIME/MIN: Performed by: NEUROLOGICAL SURGERY

## 2022-08-11 PROCEDURE — 700102 HCHG RX REV CODE 250 W/ 637 OVERRIDE(OP): Performed by: INTERNAL MEDICINE

## 2022-08-11 PROCEDURE — 700111 HCHG RX REV CODE 636 W/ 250 OVERRIDE (IP): Performed by: NURSE PRACTITIONER

## 2022-08-11 PROCEDURE — 700111 HCHG RX REV CODE 636 W/ 250 OVERRIDE (IP): Performed by: INTERNAL MEDICINE

## 2022-08-11 PROCEDURE — 160048 HCHG OR STATISTICAL LEVEL 1-5: Performed by: NEUROLOGICAL SURGERY

## 2022-08-11 PROCEDURE — 700101 HCHG RX REV CODE 250: Performed by: ANESTHESIOLOGY

## 2022-08-11 PROCEDURE — 160036 HCHG PACU - EA ADDL 30 MINS PHASE I: Performed by: NEUROLOGICAL SURGERY

## 2022-08-11 PROCEDURE — 160002 HCHG RECOVERY MINUTES (STAT): Performed by: NEUROLOGICAL SURGERY

## 2022-08-11 PROCEDURE — 8E09XBZ COMPUTER ASSISTED PROCEDURE OF HEAD AND NECK REGION: ICD-10-PCS | Performed by: OTOLARYNGOLOGY

## 2022-08-11 PROCEDURE — 160042 HCHG SURGERY MINUTES - EA ADDL 1 MIN LEVEL 5: Performed by: NEUROLOGICAL SURGERY

## 2022-08-11 PROCEDURE — 00162 ANES PX NOSE&SINUS RAD SURG: CPT | Performed by: ANESTHESIOLOGY

## 2022-08-11 PROCEDURE — 700105 HCHG RX REV CODE 258: Performed by: ANESTHESIOLOGY

## 2022-08-11 PROCEDURE — 88305 TISSUE EXAM BY PATHOLOGIST: CPT | Mod: 59

## 2022-08-11 PROCEDURE — 0GT04ZZ RESECTION OF PITUITARY GLAND, PERCUTANEOUS ENDOSCOPIC APPROACH: ICD-10-PCS | Performed by: NEUROLOGICAL SURGERY

## 2022-08-11 PROCEDURE — 87070 CULTURE OTHR SPECIMN AEROBIC: CPT

## 2022-08-11 PROCEDURE — 700101 HCHG RX REV CODE 250: Performed by: NEUROLOGICAL SURGERY

## 2022-08-11 PROCEDURE — 700111 HCHG RX REV CODE 636 W/ 250 OVERRIDE (IP): Performed by: ANESTHESIOLOGY

## 2022-08-11 PROCEDURE — 87102 FUNGUS ISOLATION CULTURE: CPT

## 2022-08-11 PROCEDURE — 099R8ZZ DRAINAGE OF LEFT MAXILLARY SINUS, VIA NATURAL OR ARTIFICIAL OPENING ENDOSCOPIC: ICD-10-PCS | Performed by: OTOLARYNGOLOGY

## 2022-08-11 PROCEDURE — 099Q8ZZ DRAINAGE OF RIGHT MAXILLARY SINUS, VIA NATURAL OR ARTIFICIAL OPENING ENDOSCOPIC: ICD-10-PCS | Performed by: OTOLARYNGOLOGY

## 2022-08-11 PROCEDURE — 87077 CULTURE AEROBIC IDENTIFY: CPT

## 2022-08-11 PROCEDURE — 700101 HCHG RX REV CODE 250: Performed by: INTERNAL MEDICINE

## 2022-08-11 PROCEDURE — 87186 SC STD MICRODIL/AGAR DIL: CPT | Mod: 91

## 2022-08-11 PROCEDURE — 700101 HCHG RX REV CODE 250: Performed by: NURSE PRACTITIONER

## 2022-08-11 PROCEDURE — 70450 CT HEAD/BRAIN W/O DYE: CPT

## 2022-08-11 PROCEDURE — 09TU8ZZ RESECTION OF RIGHT ETHMOID SINUS, VIA NATURAL OR ARTIFICIAL OPENING ENDOSCOPIC: ICD-10-PCS | Performed by: OTOLARYNGOLOGY

## 2022-08-11 PROCEDURE — 85610 PROTHROMBIN TIME: CPT

## 2022-08-11 PROCEDURE — 700102 HCHG RX REV CODE 250 W/ 637 OVERRIDE(OP): Performed by: NURSE PRACTITIONER

## 2022-08-11 PROCEDURE — 09TV8ZZ RESECTION OF LEFT ETHMOID SINUS, VIA NATURAL OR ARTIFICIAL OPENING ENDOSCOPIC: ICD-10-PCS | Performed by: OTOLARYNGOLOGY

## 2022-08-11 PROCEDURE — 700111 HCHG RX REV CODE 636 W/ 250 OVERRIDE (IP): Performed by: NEUROLOGICAL SURGERY

## 2022-08-11 PROCEDURE — 99291 CRITICAL CARE FIRST HOUR: CPT | Performed by: INTERNAL MEDICINE

## 2022-08-11 PROCEDURE — A9270 NON-COVERED ITEM OR SERVICE: HCPCS | Performed by: NURSE PRACTITIONER

## 2022-08-11 PROCEDURE — 110454 HCHG SHELL REV 250: Performed by: NEUROLOGICAL SURGERY

## 2022-08-11 PROCEDURE — 87147 CULTURE TYPE IMMUNOLOGIC: CPT

## 2022-08-11 PROCEDURE — 85730 THROMBOPLASTIN TIME PARTIAL: CPT

## 2022-08-11 PROCEDURE — 770022 HCHG ROOM/CARE - ICU (200)

## 2022-08-11 PROCEDURE — 09BL8ZZ EXCISION OF NASAL TURBINATE, VIA NATURAL OR ARTIFICIAL OPENING ENDOSCOPIC: ICD-10-PCS | Performed by: OTOLARYNGOLOGY

## 2022-08-11 PROCEDURE — 81025 URINE PREGNANCY TEST: CPT

## 2022-08-11 PROCEDURE — 09SM4ZZ REPOSITION NASAL SEPTUM, PERCUTANEOUS ENDOSCOPIC APPROACH: ICD-10-PCS | Performed by: OTOLARYNGOLOGY

## 2022-08-11 PROCEDURE — 88311 DECALCIFY TISSUE: CPT

## 2022-08-11 PROCEDURE — C1729 CATH, DRAINAGE: HCPCS | Performed by: NEUROLOGICAL SURGERY

## 2022-08-11 PROCEDURE — A9270 NON-COVERED ITEM OR SERVICE: HCPCS | Performed by: INTERNAL MEDICINE

## 2022-08-11 RX ORDER — SCOLOPAMINE TRANSDERMAL SYSTEM 1 MG/1
1 PATCH, EXTENDED RELEASE TRANSDERMAL
Status: DISCONTINUED | OUTPATIENT
Start: 2022-08-11 | End: 2022-08-16 | Stop reason: HOSPADM

## 2022-08-11 RX ORDER — OXYCODONE HCL 5 MG/5 ML
10 SOLUTION, ORAL ORAL
Status: DISCONTINUED | OUTPATIENT
Start: 2022-08-11 | End: 2022-08-11 | Stop reason: HOSPADM

## 2022-08-11 RX ORDER — AMOXICILLIN 250 MG
2 CAPSULE ORAL 2 TIMES DAILY
Status: DISCONTINUED | OUTPATIENT
Start: 2022-08-11 | End: 2022-08-16 | Stop reason: HOSPADM

## 2022-08-11 RX ORDER — ACETAMINOPHEN 500 MG
1000 TABLET ORAL EVERY 8 HOURS
Status: DISCONTINUED | OUTPATIENT
Start: 2022-08-11 | End: 2022-08-16 | Stop reason: HOSPADM

## 2022-08-11 RX ORDER — HYDRALAZINE HYDROCHLORIDE 20 MG/ML
10 INJECTION INTRAMUSCULAR; INTRAVENOUS
Status: DISCONTINUED | OUTPATIENT
Start: 2022-08-11 | End: 2022-08-11 | Stop reason: HOSPADM

## 2022-08-11 RX ORDER — AMOXICILLIN 250 MG
1 CAPSULE ORAL NIGHTLY
Status: DISCONTINUED | OUTPATIENT
Start: 2022-08-11 | End: 2022-08-11

## 2022-08-11 RX ORDER — DIPHENHYDRAMINE HYDROCHLORIDE 50 MG/ML
25 INJECTION INTRAMUSCULAR; INTRAVENOUS EVERY 6 HOURS PRN
Status: DISCONTINUED | OUTPATIENT
Start: 2022-08-11 | End: 2022-08-16 | Stop reason: HOSPADM

## 2022-08-11 RX ORDER — CEFAZOLIN SODIUM 1 G/3ML
INJECTION, POWDER, FOR SOLUTION INTRAMUSCULAR; INTRAVENOUS
Status: DISCONTINUED | OUTPATIENT
Start: 2022-08-11 | End: 2022-08-11 | Stop reason: HOSPADM

## 2022-08-11 RX ORDER — POLYETHYLENE GLYCOL 3350 17 G/17G
1 POWDER, FOR SOLUTION ORAL
Status: DISCONTINUED | OUTPATIENT
Start: 2022-08-11 | End: 2022-08-16 | Stop reason: HOSPADM

## 2022-08-11 RX ORDER — HYDROMORPHONE HYDROCHLORIDE 1 MG/ML
0.2 INJECTION, SOLUTION INTRAMUSCULAR; INTRAVENOUS; SUBCUTANEOUS
Status: DISCONTINUED | OUTPATIENT
Start: 2022-08-11 | End: 2022-08-11 | Stop reason: HOSPADM

## 2022-08-11 RX ORDER — ENEMA 19; 7 G/133ML; G/133ML
1 ENEMA RECTAL
Status: COMPLETED | OUTPATIENT
Start: 2022-08-11 | End: 2022-08-13

## 2022-08-11 RX ORDER — DOCUSATE SODIUM 100 MG/1
100 CAPSULE, LIQUID FILLED ORAL 2 TIMES DAILY
Status: DISCONTINUED | OUTPATIENT
Start: 2022-08-11 | End: 2022-08-16 | Stop reason: HOSPADM

## 2022-08-11 RX ORDER — DIPHENHYDRAMINE HYDROCHLORIDE 50 MG/ML
12.5 INJECTION INTRAMUSCULAR; INTRAVENOUS
Status: DISCONTINUED | OUTPATIENT
Start: 2022-08-11 | End: 2022-08-11 | Stop reason: HOSPADM

## 2022-08-11 RX ORDER — FLUTICASONE PROPIONATE 110 UG/1
2 AEROSOL, METERED RESPIRATORY (INHALATION)
Status: DISCONTINUED | OUTPATIENT
Start: 2022-08-11 | End: 2022-08-13

## 2022-08-11 RX ORDER — IBUPROFEN 200 MG
400 TABLET ORAL 2 TIMES DAILY PRN
Status: ON HOLD | COMMUNITY
End: 2022-08-11

## 2022-08-11 RX ORDER — SODIUM CHLORIDE AND POTASSIUM CHLORIDE 150; 900 MG/100ML; MG/100ML
INJECTION, SOLUTION INTRAVENOUS CONTINUOUS
Status: DISCONTINUED | OUTPATIENT
Start: 2022-08-11 | End: 2022-08-12

## 2022-08-11 RX ORDER — BISACODYL 10 MG
10 SUPPOSITORY, RECTAL RECTAL
Status: DISCONTINUED | OUTPATIENT
Start: 2022-08-11 | End: 2022-08-11

## 2022-08-11 RX ORDER — LABETALOL HYDROCHLORIDE 5 MG/ML
5 INJECTION, SOLUTION INTRAVENOUS
Status: DISCONTINUED | OUTPATIENT
Start: 2022-08-11 | End: 2022-08-11 | Stop reason: HOSPADM

## 2022-08-11 RX ORDER — HYDRALAZINE HYDROCHLORIDE 20 MG/ML
10-20 INJECTION INTRAMUSCULAR; INTRAVENOUS
Status: DISCONTINUED | OUTPATIENT
Start: 2022-08-11 | End: 2022-08-16 | Stop reason: HOSPADM

## 2022-08-11 RX ORDER — ACETAMINOPHEN 325 MG/1
650 TABLET ORAL EVERY 6 HOURS PRN
Status: DISCONTINUED | OUTPATIENT
Start: 2022-08-11 | End: 2022-08-16 | Stop reason: HOSPADM

## 2022-08-11 RX ORDER — POLYETHYLENE GLYCOL 3350 17 G/17G
1 POWDER, FOR SOLUTION ORAL 2 TIMES DAILY PRN
Status: DISCONTINUED | OUTPATIENT
Start: 2022-08-11 | End: 2022-08-11

## 2022-08-11 RX ORDER — ONDANSETRON 2 MG/ML
4 INJECTION INTRAMUSCULAR; INTRAVENOUS
Status: DISCONTINUED | OUTPATIENT
Start: 2022-08-11 | End: 2022-08-11 | Stop reason: HOSPADM

## 2022-08-11 RX ORDER — HALOPERIDOL 5 MG/ML
1 INJECTION INTRAMUSCULAR
Status: DISCONTINUED | OUTPATIENT
Start: 2022-08-11 | End: 2022-08-11 | Stop reason: HOSPADM

## 2022-08-11 RX ORDER — SODIUM CHLORIDE, SODIUM LACTATE, POTASSIUM CHLORIDE, CALCIUM CHLORIDE 600; 310; 30; 20 MG/100ML; MG/100ML; MG/100ML; MG/100ML
INJECTION, SOLUTION INTRAVENOUS
Status: DISCONTINUED | OUTPATIENT
Start: 2022-08-11 | End: 2022-08-11 | Stop reason: SURG

## 2022-08-11 RX ORDER — OXYCODONE HYDROCHLORIDE 5 MG/1
5 TABLET ORAL
Status: DISCONTINUED | OUTPATIENT
Start: 2022-08-11 | End: 2022-08-16 | Stop reason: HOSPADM

## 2022-08-11 RX ORDER — OXYCODONE HYDROCHLORIDE 10 MG/1
10 TABLET ORAL
Status: DISCONTINUED | OUTPATIENT
Start: 2022-08-11 | End: 2022-08-14

## 2022-08-11 RX ORDER — SODIUM CHLORIDE, SODIUM LACTATE, POTASSIUM CHLORIDE, CALCIUM CHLORIDE 600; 310; 30; 20 MG/100ML; MG/100ML; MG/100ML; MG/100ML
INJECTION, SOLUTION INTRAVENOUS CONTINUOUS
Status: ACTIVE | OUTPATIENT
Start: 2022-08-11 | End: 2022-08-11

## 2022-08-11 RX ORDER — SODIUM CHLORIDE, SODIUM LACTATE, POTASSIUM CHLORIDE, CALCIUM CHLORIDE 600; 310; 30; 20 MG/100ML; MG/100ML; MG/100ML; MG/100ML
INJECTION, SOLUTION INTRAVENOUS CONTINUOUS
Status: DISCONTINUED | OUTPATIENT
Start: 2022-08-11 | End: 2022-08-11 | Stop reason: HOSPADM

## 2022-08-11 RX ORDER — HYDRALAZINE HYDROCHLORIDE 20 MG/ML
INJECTION INTRAMUSCULAR; INTRAVENOUS
Status: DISPENSED
Start: 2022-08-11 | End: 2022-08-11

## 2022-08-11 RX ORDER — BISACODYL 10 MG
10 SUPPOSITORY, RECTAL RECTAL
Status: DISCONTINUED | OUTPATIENT
Start: 2022-08-11 | End: 2022-08-16 | Stop reason: HOSPADM

## 2022-08-11 RX ORDER — LIDOCAINE HYDROCHLORIDE 20 MG/ML
INJECTION, SOLUTION EPIDURAL; INFILTRATION; INTRACAUDAL; PERINEURAL PRN
Status: DISCONTINUED | OUTPATIENT
Start: 2022-08-11 | End: 2022-08-11 | Stop reason: SURG

## 2022-08-11 RX ORDER — DEXAMETHASONE SODIUM PHOSPHATE 4 MG/ML
4 INJECTION, SOLUTION INTRA-ARTICULAR; INTRALESIONAL; INTRAMUSCULAR; INTRAVENOUS; SOFT TISSUE
Status: DISCONTINUED | OUTPATIENT
Start: 2022-08-11 | End: 2022-08-16 | Stop reason: HOSPADM

## 2022-08-11 RX ORDER — ALBUTEROL SULFATE 2.5 MG/3ML
2.5 SOLUTION RESPIRATORY (INHALATION)
Status: DISCONTINUED | OUTPATIENT
Start: 2022-08-11 | End: 2022-08-11 | Stop reason: HOSPADM

## 2022-08-11 RX ORDER — CEFAZOLIN SODIUM 2 G/100ML
2 INJECTION, SOLUTION INTRAVENOUS EVERY 8 HOURS
Status: COMPLETED | OUTPATIENT
Start: 2022-08-11 | End: 2022-08-11

## 2022-08-11 RX ORDER — ALBUTEROL SULFATE 2.5 MG/3ML
SOLUTION RESPIRATORY (INHALATION)
Status: DISPENSED
Start: 2022-08-11 | End: 2022-08-11

## 2022-08-11 RX ORDER — MIDAZOLAM HYDROCHLORIDE 1 MG/ML
INJECTION INTRAMUSCULAR; INTRAVENOUS PRN
Status: DISCONTINUED | OUTPATIENT
Start: 2022-08-11 | End: 2022-08-11 | Stop reason: SURG

## 2022-08-11 RX ORDER — LABETALOL HYDROCHLORIDE 5 MG/ML
INJECTION, SOLUTION INTRAVENOUS
Status: DISPENSED
Start: 2022-08-11 | End: 2022-08-11

## 2022-08-11 RX ORDER — LABETALOL HYDROCHLORIDE 5 MG/ML
10-20 INJECTION, SOLUTION INTRAVENOUS
Status: DISCONTINUED | OUTPATIENT
Start: 2022-08-11 | End: 2022-08-16 | Stop reason: HOSPADM

## 2022-08-11 RX ORDER — DEXAMETHASONE SODIUM PHOSPHATE 4 MG/ML
INJECTION, SOLUTION INTRA-ARTICULAR; INTRALESIONAL; INTRAMUSCULAR; INTRAVENOUS; SOFT TISSUE PRN
Status: DISCONTINUED | OUTPATIENT
Start: 2022-08-11 | End: 2022-08-11 | Stop reason: SURG

## 2022-08-11 RX ORDER — HYDROMORPHONE HYDROCHLORIDE 1 MG/ML
0.1 INJECTION, SOLUTION INTRAMUSCULAR; INTRAVENOUS; SUBCUTANEOUS
Status: DISCONTINUED | OUTPATIENT
Start: 2022-08-11 | End: 2022-08-11 | Stop reason: HOSPADM

## 2022-08-11 RX ORDER — HYDROMORPHONE HYDROCHLORIDE 1 MG/ML
0.4 INJECTION, SOLUTION INTRAMUSCULAR; INTRAVENOUS; SUBCUTANEOUS
Status: DISCONTINUED | OUTPATIENT
Start: 2022-08-11 | End: 2022-08-11 | Stop reason: HOSPADM

## 2022-08-11 RX ORDER — AMOXICILLIN 250 MG
2 CAPSULE ORAL 2 TIMES DAILY
Status: DISCONTINUED | OUTPATIENT
Start: 2022-08-11 | End: 2022-08-11

## 2022-08-11 RX ORDER — AMOXICILLIN 250 MG
1 CAPSULE ORAL
Status: DISCONTINUED | OUTPATIENT
Start: 2022-08-11 | End: 2022-08-15

## 2022-08-11 RX ORDER — ROCURONIUM BROMIDE 10 MG/ML
INJECTION, SOLUTION INTRAVENOUS PRN
Status: DISCONTINUED | OUTPATIENT
Start: 2022-08-11 | End: 2022-08-11 | Stop reason: SURG

## 2022-08-11 RX ORDER — MEPERIDINE HYDROCHLORIDE 25 MG/ML
12.5 INJECTION INTRAMUSCULAR; INTRAVENOUS; SUBCUTANEOUS
Status: DISCONTINUED | OUTPATIENT
Start: 2022-08-11 | End: 2022-08-11 | Stop reason: HOSPADM

## 2022-08-11 RX ORDER — ONDANSETRON 2 MG/ML
INJECTION INTRAMUSCULAR; INTRAVENOUS PRN
Status: DISCONTINUED | OUTPATIENT
Start: 2022-08-11 | End: 2022-08-11 | Stop reason: SURG

## 2022-08-11 RX ORDER — ONDANSETRON 2 MG/ML
4 INJECTION INTRAMUSCULAR; INTRAVENOUS EVERY 4 HOURS PRN
Status: DISCONTINUED | OUTPATIENT
Start: 2022-08-11 | End: 2022-08-16 | Stop reason: HOSPADM

## 2022-08-11 RX ORDER — HYDROMORPHONE HYDROCHLORIDE 1 MG/ML
0.5 INJECTION, SOLUTION INTRAMUSCULAR; INTRAVENOUS; SUBCUTANEOUS
Status: DISCONTINUED | OUTPATIENT
Start: 2022-08-11 | End: 2022-08-16 | Stop reason: HOSPADM

## 2022-08-11 RX ORDER — CEFAZOLIN SODIUM 1 G/3ML
INJECTION, POWDER, FOR SOLUTION INTRAMUSCULAR; INTRAVENOUS PRN
Status: DISCONTINUED | OUTPATIENT
Start: 2022-08-11 | End: 2022-08-11 | Stop reason: SURG

## 2022-08-11 RX ORDER — LABETALOL HYDROCHLORIDE 5 MG/ML
10 INJECTION, SOLUTION INTRAVENOUS
Status: DISCONTINUED | OUTPATIENT
Start: 2022-08-11 | End: 2022-08-11

## 2022-08-11 RX ORDER — LEVOTHYROXINE SODIUM 0.05 MG/1
50 TABLET ORAL
Status: DISCONTINUED | OUTPATIENT
Start: 2022-08-12 | End: 2022-08-16 | Stop reason: HOSPADM

## 2022-08-11 RX ORDER — POLYETHYLENE GLYCOL 3350 17 G/17G
1 POWDER, FOR SOLUTION ORAL
Status: DISCONTINUED | OUTPATIENT
Start: 2022-08-11 | End: 2022-08-11

## 2022-08-11 RX ORDER — HYDRALAZINE HYDROCHLORIDE 20 MG/ML
10 INJECTION INTRAMUSCULAR; INTRAVENOUS
Status: DISCONTINUED | OUTPATIENT
Start: 2022-08-11 | End: 2022-08-11

## 2022-08-11 RX ORDER — OXYCODONE HCL 5 MG/5 ML
5 SOLUTION, ORAL ORAL
Status: DISCONTINUED | OUTPATIENT
Start: 2022-08-11 | End: 2022-08-11 | Stop reason: HOSPADM

## 2022-08-11 RX ORDER — EPINEPHRINE 1 MG/ML
INJECTION INTRAMUSCULAR; INTRAVENOUS; SUBCUTANEOUS
Status: DISCONTINUED | OUTPATIENT
Start: 2022-08-11 | End: 2022-08-11 | Stop reason: HOSPADM

## 2022-08-11 RX ADMIN — FENTANYL CITRATE 50 MCG: 50 INJECTION, SOLUTION INTRAMUSCULAR; INTRAVENOUS at 07:22

## 2022-08-11 RX ADMIN — ACETAMINOPHEN 1000 MG: 500 TABLET ORAL at 22:27

## 2022-08-11 RX ADMIN — ALBUTEROL SULFATE 2.5 MG: 2.5 SOLUTION RESPIRATORY (INHALATION) at 11:35

## 2022-08-11 RX ADMIN — ACETAMINOPHEN 1000 MG: 500 TABLET ORAL at 13:26

## 2022-08-11 RX ADMIN — CEFAZOLIN SODIUM 2 G: 2 INJECTION, SOLUTION INTRAVENOUS at 16:15

## 2022-08-11 RX ADMIN — CEFAZOLIN SODIUM 2 G: 2 INJECTION, SOLUTION INTRAVENOUS at 22:28

## 2022-08-11 RX ADMIN — HYDRALAZINE HYDROCHLORIDE 10 MG: 20 INJECTION INTRAMUSCULAR; INTRAVENOUS at 11:29

## 2022-08-11 RX ADMIN — FENTANYL CITRATE 25 MCG: 50 INJECTION, SOLUTION INTRAMUSCULAR; INTRAVENOUS at 11:40

## 2022-08-11 RX ADMIN — LABETALOL HYDROCHLORIDE 5 MG: 5 INJECTION, SOLUTION INTRAVENOUS at 12:06

## 2022-08-11 RX ADMIN — SENNOSIDES AND DOCUSATE SODIUM 2 TABLET: 50; 8.6 TABLET ORAL at 17:36

## 2022-08-11 RX ADMIN — DOCUSATE SODIUM 100 MG: 100 CAPSULE, LIQUID FILLED ORAL at 17:35

## 2022-08-11 RX ADMIN — OXYCODONE HYDROCHLORIDE 10 MG: 10 TABLET ORAL at 22:26

## 2022-08-11 RX ADMIN — POTASSIUM CHLORIDE AND SODIUM CHLORIDE: 900; 150 INJECTION, SOLUTION INTRAVENOUS at 13:27

## 2022-08-11 RX ADMIN — OXYCODONE HYDROCHLORIDE 10 MG: 10 TABLET ORAL at 17:35

## 2022-08-11 RX ADMIN — ONDANSETRON 4 MG: 2 INJECTION INTRAMUSCULAR; INTRAVENOUS at 07:22

## 2022-08-11 RX ADMIN — FENTANYL CITRATE 25 MCG: 50 INJECTION, SOLUTION INTRAMUSCULAR; INTRAVENOUS at 11:02

## 2022-08-11 RX ADMIN — HYDRALAZINE HYDROCHLORIDE 10 MG: 20 INJECTION INTRAMUSCULAR; INTRAVENOUS at 11:48

## 2022-08-11 RX ADMIN — LABETALOL HYDROCHLORIDE 20 MG: 5 INJECTION, SOLUTION INTRAVENOUS at 18:03

## 2022-08-11 RX ADMIN — MAGNESIUM HYDROXIDE 30 ML: 400 SUSPENSION ORAL at 16:15

## 2022-08-11 RX ADMIN — FENTANYL CITRATE 25 MCG: 50 INJECTION, SOLUTION INTRAMUSCULAR; INTRAVENOUS at 11:55

## 2022-08-11 RX ADMIN — DEXAMETHASONE SODIUM PHOSPHATE 10 MG: 4 INJECTION, SOLUTION INTRA-ARTICULAR; INTRALESIONAL; INTRAMUSCULAR; INTRAVENOUS; SOFT TISSUE at 07:22

## 2022-08-11 RX ADMIN — PROPOFOL 200 MG: 10 INJECTION, EMULSION INTRAVENOUS at 07:28

## 2022-08-11 RX ADMIN — HYDRALAZINE HYDROCHLORIDE 10 MG: 20 INJECTION INTRAMUSCULAR; INTRAVENOUS at 13:49

## 2022-08-11 RX ADMIN — MIDAZOLAM HYDROCHLORIDE 2 MG: 1 INJECTION, SOLUTION INTRAMUSCULAR; INTRAVENOUS at 07:18

## 2022-08-11 RX ADMIN — OXYCODONE HYDROCHLORIDE 10 MG: 10 TABLET ORAL at 13:25

## 2022-08-11 RX ADMIN — FENTANYL CITRATE 50 MCG: 50 INJECTION, SOLUTION INTRAMUSCULAR; INTRAVENOUS at 08:15

## 2022-08-11 RX ADMIN — CEFAZOLIN 2 G: 330 INJECTION, POWDER, FOR SOLUTION INTRAMUSCULAR; INTRAVENOUS at 07:28

## 2022-08-11 RX ADMIN — LABETALOL HYDROCHLORIDE 10 MG: 5 INJECTION, SOLUTION INTRAVENOUS at 15:22

## 2022-08-11 RX ADMIN — SODIUM CHLORIDE, POTASSIUM CHLORIDE, SODIUM LACTATE AND CALCIUM CHLORIDE: 600; 310; 30; 20 INJECTION, SOLUTION INTRAVENOUS at 07:18

## 2022-08-11 RX ADMIN — ROCURONIUM BROMIDE 50 MG: 10 INJECTION, SOLUTION INTRAVENOUS at 07:23

## 2022-08-11 RX ADMIN — LIDOCAINE HYDROCHLORIDE 100 MG: 20 INJECTION, SOLUTION EPIDURAL; INFILTRATION; INTRACAUDAL at 07:22

## 2022-08-11 ASSESSMENT — ENCOUNTER SYMPTOMS
PALPITATIONS: 0
SPEECH CHANGE: 0
SHORTNESS OF BREATH: 0
ABDOMINAL PAIN: 0
DIZZINESS: 0
SPUTUM PRODUCTION: 0
DEPRESSION: 0
HEADACHES: 1
FEVER: 0
SENSORY CHANGE: 0
VOMITING: 0
BRUISES/BLEEDS EASILY: 0
BACK PAIN: 0
NERVOUS/ANXIOUS: 0
COUGH: 0
BLURRED VISION: 0
MYALGIAS: 0
SORE THROAT: 1
NAUSEA: 0
CONSTIPATION: 1
CHILLS: 0

## 2022-08-11 ASSESSMENT — PATIENT HEALTH QUESTIONNAIRE - PHQ9
1. LITTLE INTEREST OR PLEASURE IN DOING THINGS: SEVERAL DAYS
SUM OF ALL RESPONSES TO PHQ9 QUESTIONS 1 AND 2: 1

## 2022-08-11 ASSESSMENT — PAIN DESCRIPTION - PAIN TYPE
TYPE: SURGICAL PAIN

## 2022-08-11 ASSESSMENT — FIBROSIS 4 INDEX: FIB4 SCORE: 0.79

## 2022-08-11 NOTE — CONSULTS
Critical Care Consultation    Date of consult: 8/11/2022    Referring Physician  Norman Mejia M.D.    Reason for Consultation  Pituitary macroadenoma    History of Presenting Illness  42 y.o. female who presented 8/11/2022 with a past medical history significant for allergic rhinitis who began having headaches in 2020.  She eventually developed worsening headaches and vision changes, had weight gain, and her menstrual cycle stopped.  An MRI of her brain showed a suprasellar/sellar mass with cavernous sinus invasion.  She was referred to ENT and neurosurgery and today underwent an elective transsphenoidal pituitary resection by neurosurgery Dr. Mejia and assisted by ENT Dr. Pena who also performed bilateral maxillary antrostomy, total ethmoidectomy, septoplasty, and inferior turbinate resection.  Surgery was uncomplicated and she was brought to the intensive care unit post procedure and I was consulted for perioperative critical care management.    Code Status  Full Code    Review of Systems  Review of Systems   Constitutional:  Negative for chills, fever and malaise/fatigue.   HENT:  Positive for sore throat. Negative for congestion.    Eyes:  Negative for blurred vision.   Respiratory:  Negative for cough, sputum production and shortness of breath.    Cardiovascular:  Negative for chest pain, palpitations and leg swelling.   Gastrointestinal:  Positive for constipation. Negative for abdominal pain, nausea and vomiting.   Genitourinary:  Negative for dysuria.   Musculoskeletal:  Negative for back pain and myalgias.   Skin:  Negative for rash.   Neurological:  Positive for headaches. Negative for dizziness, sensory change and speech change.   Endo/Heme/Allergies:  Does not bruise/bleed easily.   Psychiatric/Behavioral:  Negative for depression. The patient is not nervous/anxious.    All other systems reviewed and are negative.    Past Medical History   has a past medical history of Allergy, Anemia (2018),  Anesthesia (07/28/2022), Asthma (07/28/2022), Bowel habit changes (07/28/2022), Breath shortness (07/28/2022), Chronic sinusitis, Disorder of thyroid (07/28/2022), GERD (gastroesophageal reflux disease), Head ache (07/28/2022), Heart burn (07/28/2022), Hydradenitis, Obesity, Psychiatric problem (07/28/2022), and Snoring (07/28/2022).    Surgical History   has a past surgical history that includes other (2000).    Family History  family history includes Breast Cancer (age of onset: 48) in her sister; Cancer (age of onset: 40) in her mother; Diabetes in her mother; Hypertension in her father and mother; Stroke in her mother.    Social History   reports that she has never smoked. She has never used smokeless tobacco. She reports that she does not currently use alcohol. She reports that she does not use drugs.    Medications  Home Medications       Reviewed by Gertrudis Pedro R.N. (Registered Nurse) on 08/11/22 at 0810  Med List Status: Not Addressed     Medication Last Dose Status   ibuprofen (MOTRIN) 200 MG Tab 8/8/2022 Active   lactated ringers infusion  Active   levothyroxine (SYNTHROID) 50 MCG Tab 8/10/2022 Active   lidocaine (XYLOCAINE) 1 % injection 0.5 mL  Active   QVAR REDIHALER 80 MCG/ACT inhaler 8/10/2022 Active                  Current Facility-Administered Medications   Medication Dose Route Frequency Provider Last Rate Last Admin    levothyroxine (SYNTHROID) tablet 50 mcg  50 mcg Oral AM ES MARAL Jacobs        beclomethasone HFA (QVAR REDIHALER) 80 MCG/ACT inhaler 1 Puff  1 Puff Inhalation BID MARAL Jacobs        Pharmacy Consult Request ...Pain Management Review 1 Each  1 Each Other PHARMACY TO DOSE MARAL Jacobs MD ALERT...DO NOT ADMINISTER NSAIDS or ASPIRIN unless ORDERED By Neurosurgery 1 Each  1 Each Other PRN MARAL Jacobs        ondansetron (ZOFRAN) syringe/vial injection 4 mg  4 mg Intravenous Q4HRS PRN Vitaly Terry, A.P.R.N.        dexamethasone (DECADRON) injection 4 mg  4 mg  Intravenous Once PRN Vitaly Stewart A.P.R.N.        diphenhydrAMINE (BENADRYL) injection 25 mg  25 mg Intravenous Q6HRS PRN Vitaly Stewart A.P.R.N.        scopolamine (TRANSDERM-SCOP) patch 1 Patch  1 Patch Transdermal Q72HRS PRN Vitaly Stewart A.P.R.N.        docusate sodium (COLACE) capsule 100 mg  100 mg Oral BID ANKIT Jacobs.P.R.DELVIN.        senna-docusate (PERICOLACE or SENOKOT S) 8.6-50 MG per tablet 1 Tablet  1 Tablet Oral Nightly Vitaly Stewart A.P.R.N.        senna-docusate (PERICOLACE or SENOKOT S) 8.6-50 MG per tablet 1 Tablet  1 Tablet Oral Q24HRS PRN Vitaly Stewart A.P.R.N.        polyethylene glycol/lytes (MIRALAX) PACKET 1 Packet  1 Packet Oral BID PRN Vitaly Stewart A.P.R.N.        bisacodyl (DULCOLAX) suppository 10 mg  10 mg Rectal Q24HRS PRN Vitaly Stewart A.P.R.N.        sodium phosphate (Fleet) enema 133 mL  1 Each Rectal Once PRN Vitaly Stewart A.P.R.N.        0.9 % NaCl with KCl 20 mEq infusion   Intravenous Continuous ANKIT Jacobs.P.R.N.        acetaminophen (TYLENOL) tablet 1,000 mg  1,000 mg Oral Q8HRS Vitaly Stewart A.P.R.N.        oxyCODONE immediate-release (ROXICODONE) tablet 5 mg  5 mg Oral Q3HRS PRN Vitaly Stewart A.P.R.N.        Or    oxyCODONE immediate release (ROXICODONE) tablet 10 mg  10 mg Oral Q3HRS PRN Vitaly Stewart, A.P.R.N.        Or    HYDROmorphone (Dilaudid) injection 0.5 mg  0.5 mg Intravenous Q3HRS PRN Vitaly Stewart A.P.R.N.        ceFAZolin in dextrose (Ancef) IVPB premix 2 g  2 g Intravenous Q8HR Vitaly Stewart A.P.R.N.        labetalol (NORMODYNE/TRANDATE) injection 10 mg  10 mg Intravenous Q HOUR PRN Vitaly Stewart, A.P.R.N.        hydrALAZINE (APRESOLINE) injection 10 mg  10 mg Intravenous Q HOUR PRN Vitaly Stewart, A.P.R.N.        senna-docusate (PERICOLACE or SENOKOT S) 8.6-50 MG per tablet 2 Tablet  2 Tablet Oral BID Frances Walsh, A.P.R.N.        And    polyethylene glycol/lytes (MIRALAX) PACKET 1 Packet  1 Packet Oral QDAY PRN Frances Walsh, A.P.R.N.        And    magnesium hydroxide (MILK OF MAGNESIA) suspension 30 mL  30 mL Oral QDAY PRN Frances INTERIANO  Nico, A.P.R.N.        And    bisacodyl (DULCOLAX) suppository 10 mg  10 mg Rectal QDAY PRN Frances Walsh A.P.R.N.        acetaminophen (Tylenol) tablet 650 mg  650 mg Oral Q6HRS PRN Frances Walsh A.P.R.N.        HYDRALAZINE HCL 20 MG/ML INJ SOLN             ALBUTEROL SULFATE (2.5 MG/3ML) 0.083% INH NEBU             LABETALOL HCL 5 MG/ML IV SOLN                Allergies  Allergies   Allergen Reactions    Bloodless Unspecified     Pt requesting bloodless protocol    Leoti Oil Hives and Rash     Other reaction(s): Rash, urticarial      Cat Hair Extract Itching    Kiwi Extract      Other reaction(s): Lip Swellen  Other reaction(s): Lip Swellen      Cholestatin Itching       Vital Signs last 24 hours  Temp:  [35.9 °C (96.6 °F)-36.2 °C (97.2 °F)] 36.2 °C (97.2 °F)  Pulse:  [81-98] 93  Resp:  [9-26] 26  BP: (120-177)/() 156/81  SpO2:  [86 %-100 %] 93 %    Physical Exam  Physical Exam  Vitals and nursing note reviewed.   Constitutional:       General: She is sleeping. She is not in acute distress.     Appearance: She is well-developed. She is morbidly obese. She is not toxic-appearing.      Interventions: Face mask in place.   HENT:      Head: Normocephalic and atraumatic.      Nose:      Comments: Minimal dried blood around nares, internal packing but nothing external     Mouth/Throat:      Mouth: Mucous membranes are dry.      Pharynx: Oropharynx is clear.      Comments: Minimal dried blood in oropharynx  Eyes:      General: No scleral icterus.     Conjunctiva/sclera: Conjunctivae normal.      Pupils: Pupils are equal, round, and reactive to light.   Neck:      Vascular: No JVD.      Trachea: No tracheal deviation.   Cardiovascular:      Rate and Rhythm: Normal rate and regular rhythm.      Pulses: Normal pulses.      Heart sounds: Normal heart sounds. No murmur heard.    No friction rub.      Comments: Hypertensive  Pulmonary:      Effort: Pulmonary effort is normal. No respiratory distress.      Breath  sounds: Normal breath sounds. No wheezing.      Comments: Speaking in full sentences without difficulty  Abdominal:      General: Bowel sounds are normal. There is no distension.      Palpations: Abdomen is soft.      Tenderness: There is no abdominal tenderness. There is no guarding.   Musculoskeletal:         General: No tenderness.      Cervical back: Neck supple. No rigidity.      Right lower leg: No edema.      Left lower leg: No edema.   Skin:     General: Skin is warm and dry.      Capillary Refill: Capillary refill takes less than 2 seconds.      Findings: No rash.   Neurological:      General: No focal deficit present.      Mental Status: She is oriented to person, place, and time and easily aroused.      Cranial Nerves: No cranial nerve deficit.      Sensory: No sensory deficit.      Motor: No weakness.   Psychiatric:         Behavior: Behavior normal. Behavior is cooperative.         Thought Content: Thought content normal.       Fluids    Intake/Output Summary (Last 24 hours) at 8/11/2022 1308  Last data filed at 8/11/2022 1150  Gross per 24 hour   Intake 800 ml   Output 350 ml   Net 450 ml       Laboratory  Recent Results (from the past 48 hour(s))   HCG Qualitative Ur    Collection Time: 08/11/22  5:20 AM   Result Value Ref Range    Beta-Hcg Urine Negative Negative   PT    Collection Time: 08/11/22  5:45 AM   Result Value Ref Range    PT 14.6 12.0 - 14.6 sec    INR 1.16 (H) 0.87 - 1.13   APTT    Collection Time: 08/11/22  5:45 AM   Result Value Ref Range    APTT 31.5 24.7 - 36.0 sec   CULTURE WOUND W/ GRAM STAIN    Collection Time: 08/11/22  9:56 AM    Specimen: Wound   Result Value Ref Range    Significant Indicator NEG     Source WND     Site sellar mass     Culture Result -     Gram Stain Result -    Anaerobic Culture    Collection Time: 08/11/22  9:56 AM    Specimen: Wound   Result Value Ref Range    Significant Indicator NEG     Source WND     Site sellar mass     Culture Result -    Fungal  Culture    Collection Time: 08/11/22  9:56 AM    Specimen: Wound   Result Value Ref Range    Significant Indicator NEG     Source WND     Site sellar mass     Culture Result -     Fungal Smear Results -    Histology Request    Collection Time: 08/11/22 11:31 AM   Result Value Ref Range    Pathology Request Sent to Histo        Imaging  CT-STEALTH HEAD W/O   Final Result         1.  No acute intracranial abnormality identified.   2.  Expansion of the sella compatible with known pituitary adenoma   3.  Bilateral ethmoid and maxillary sinusitis changes         CT-HEAD W/O    (Results Pending)       Assessment/Plan  * Pituitary macroadenoma (HCC)- (present on admission)  Assessment & Plan  S/p transsphenoidal resection 8/11 by Dr. Mejia  Routine postoperative management  Monitor for CSF leak  As needed analgesics  Close neurologic monitoring  Goal SBP less than 160  Postop head CT at 1700    Hypertension- (present on admission)  Assessment & Plan  No prescribed outpatient therapy  As needed labetalol and hydralazine for goal SBP less than 160  May need to initiate scheduled antihypertensive    Morbid obesity (HCC)- (present on admission)  Assessment & Plan  Encourage behavioral and dietary modification for weight loss    Chronic sinusitis- (present on admission)  Assessment & Plan  S/p ethmoidectomy, septoplasty, inferior turbinate resection 8/11  As needed nasal spray when cleared by ENT  Perioperative antibiotics      Discussed patient condition and risk of morbidity and/or mortality with RN, Pharmacy, Charge nurse / hot rounds, Patient, and neurosurgery.    The patient remains critically ill.  Critical care time = 31 minutes in directly providing and coordinating critical care and extensive data review.  No time overlap and excludes procedures.

## 2022-08-11 NOTE — ANESTHESIA POSTPROCEDURE EVALUATION
Patient: Ernesto Ramos    Procedure Summary     Date: 08/11/22 Room / Location: [facility] OR 06 / SURGERY [facility]    Anesthesia Start: 0718 Anesthesia Stop: 1040    Procedures:       STEALTH TRANSSPHENOLDAL PITUITARY RESECTION AND LUMBAR DRAIN (Nose)      LUMBAR PUNCTURE, WITH DRAIN INSERTION (Back)      ENDOSCOPIC TRANSSPHENOIDAL RESECTION OF PITUITARY TUMOR, UTILIZATION OF IMAGE GUIDANCE, INTRADURAL, SEPTOPLASTY, BILATERAL INFERIOR TURBINATE REDUCATION, BILATERAL MAXILLARY ANTROSTOMY, BILATERAL TOTAL ETHMOIDECTOMY (Nose)      SEPTOPLASTY (Nose)      REDUCTION, NASAL TURBINATE (Nose)      MAXILLARY ANTROSTOMY (Nose)      ETHMOIDECTOMY (Nose) Diagnosis: (PITUITARY MACROADENOMABENIGN NEOPLASM OF PITUITARY GLAND)    Surgeons: Norman Mejia M.D.; Mary Pena M.D. Responsible Provider: Tobey Gansert, M.D.    Anesthesia Type: general ASA Status: 3          Final Anesthesia Type: general  Last vitals  BP   Blood Pressure: (!) 156/81, Arterial BP: (!) 162/74    Temp   36.2 °C (97.2 °F)    Pulse   93   Resp   (!) 26    SpO2   93 %      Anesthesia Post Evaluation    Patient location during evaluation: PACU  Patient participation: complete - patient participated  Level of consciousness: awake and alert    Airway patency: patent  Anesthetic complications: no  Cardiovascular status: hemodynamically stable  Respiratory status: acceptable  Hydration status: euvolemic    PONV: none          No notable events documented.     Nurse Pain Score: 8 (NPRS)

## 2022-08-11 NOTE — CARE PLAN
The patient is Stable - Low risk of patient condition declining or worsening         Progress made toward(s) clinical / shift goals:  stable neurologic exam    Patient is not progressing towards the following goals:BM and SBP < 160      Problem: Bowel Elimination  Goal: Establish and maintain regular bowel function  Outcome: Not Progressing  Note: Per patient no BM for 3 days. Dr. Gonda aware provided MOM per patient already taken miralax with no result       Problem: Psychosocial  Goal: Patient's level of anxiety will decrease  Outcome: Progressing  Note: Drowsy post surgery, reorientation and education provided with each interaction  Goal: Patient's ability to verbalize feelings about condition will improve  Outcome: Met     Problem: Hemodynamics  Goal: Patient's hemodynamics, fluid balance and neurologic status will be stable or improve  Outcome: Progressing  Note: SBP goal < 160, elevated requiring PRN's     Problem: Respiratory  Goal: Patient will achieve/maintain optimum respiratory ventilation and gas exchange  Outcome: Met  Note: On 5L oxymask       Problem: Urinary Elimination  Goal: Establish and maintain regular urinary output  Outcome: Progressing

## 2022-08-11 NOTE — OP REPORT
Surgeons  Norman Bowles ENT    Preop diagnosis  Pituitary adenoma with cystic components  Postoperative diagnosis  Pituitary apoplexy    Specimen permanent specimen sent for pathology, intrasellar swab sent for Gram stain AFB and fungal    Complications none    Procedures  ENT assisted endoscopic approach for transsphenoidal pituitary resection  Please see ENT procedural notes for sinus surgeries performed during the approach  Placement of lumbar drain and removal at end of case  Resection of pituitary adenoma using endoscopic approach    Blood loss less than 200 cc  Implants none    Brief HPI is a 42-year-old woman who presented to neurosurgery clinic with several months of vision loss that it stabilized she had no hormonal dysregulation in her work-up and she had an MRI demonstrating a large cystic component with concern for either pituitary apoplexy or cystic pituitary adenoma she was scheduled for surgical resection of the lesion and drainage of the cyst.    Consent was obtained for the patient apprising risk complication Acacian surgery which included but not limited to need for more surgery she agreed and consented.    Procedure in detail the patient is brought the operating placed under general anesthesia with endotracheal intubation she was then rotated into a right lateral position and a surgical timeout was performed to confirm the correct side site procedure and patient with all faculty and staff agreeing she was then clipped prepped and draped in sterile usual fashion for placement of lumbar drain we then inserted the Touhy needle into her back at approximately the L3-4 level using her iliac crest as a landmark the patient is morbidly obese and so with palpation were able to palpate onto the spinous process and then inserted into the interlaminar space we were able to get good CSF flow and then advanced a lumbar drain catheter with close tip and stylette inserted approximately 20 to 30 cm  into her back and then remove the Touhy needle the drain was then secured in place using a anchor and then had a body stitch applied we then draped this over the patient's left side and then placed a Ioban as a dressing this was then connected to the drainage system and and allowed to flush through and then locked in place in the off position.    We then placed patient neutral position clipped prepped and draped in sterile usual fashion for a endoscopic endonasal approach for the sella and the case was turned over to Dr. Bowles for approach please see her notes for details on the sinus component of the surgery septal repair and other details.  Once the sphenoid sinuses been open we could see the face of the sella there is been complete erosion of the bone at the back of the sella or the front of the sella where the lesion was we were then given control over the case with Dr. Bowles held the endoscope we removed residual bone over the face of the sella remove the mucosal were able to see the dura at this point once we had palpated laterally enough to get her intracarotid distance we then opened the sella using a pair of meme knives straight to open it at this point a large amount of murky old blood product sprayed out under pressure it also had a turbid appearance to it as well and therefore this was swabbed within the sella and then sent off for cultures.  We then open the residual of the dura using a cruciate fashion using a right angled meme knife until we can gain entrance we then aspirated the residual blood products and then inspected and took several pictures of the contents at this point it appeared that this was an apoplectic necrotic pituitary tumor the necrotic material was then removed using a series of curettes a large specimen of this was then taken for permanent for pathology.  I once we had removed the residual of this material and we could see that the gland had been displaced posteriorly and  superiorly we did not feel that there was any significant residual lesion the a 1 by one ericka was then inserted in the sella and then scraped around to ensure that any cellular debris that was residual was removed the tumor was necrotic white material once all this had been completely removed we then turned the case back over to Dr. Bowles who did a repair of the sella and closure.  The patient was then extubated and taken to PACU for recovery in the PACU we will remove the lumbar drain and place a body stitch.    All counts were correct x2 both myself Dr. Bowles present for the entirety of the case and the patient tolerated the procedure without any difficulty.    Norman Mejia MD

## 2022-08-11 NOTE — ANESTHESIA PREPROCEDURE EVALUATION
Case: [id number] Date/Time: 08/11/22 0645    Procedures:       STEALTH TRANSSPHENOLDAL PITUITARY RESECTION AND LUMBAR DRAIN      LUMBAR PUNCTURE, WITH DRAIN INSERTION (Back)      ENDOSCOPIC TRANSSPHENOIDAL RESECTION OF PITUITARY TUMOR, UTILIZATION OF IMAGE GUIDANCE, INTRADURAL, SEPTOPLASTY, BILATERAL INFERIOR TURBINATE REDUCATION, BILATERAL MAXILLARY ANTROSTOMY, BILATERAL TOTAL ETHMOIDECTOMY (Nose)      SEPTOPLASTY      REDUCTION, NASAL TURBINATE      MAXILLARY ANTROSTOMY      ETHMOIDECTOMY    Pre-op diagnosis:       PITUITARY MACROADENOMA      BENIGN NEOPLASM OF PITUITARY GLAND    Location: [facility] OR 06 / SURGERY [facility]    Surgeons: Norman Mejia M.D.; Mary Pena M.D.          Relevant Problems   PULMONARY   (positive) Shortness of breath      CARDIAC   (positive) Hypertension      GI   (positive) Gastroesophageal reflux disease without esophagitis       Physical Exam    Airway   Mallampati: II  TM distance: >3 FB  Neck ROM: full       Cardiovascular - normal exam  Rhythm: regular  Rate: normal  (-) murmur     Dental - normal exam           Pulmonary - normal exam  Breath sounds clear to auscultation     Abdominal    Neurological - normal exam                 Anesthesia Plan    ASA 3   ASA physical status 3 criteria: morbid obesity - BMI greater than or equal to 40    Plan - general       Airway plan will be ETT          Induction: intravenous    Postoperative Plan: Postoperative administration of opioids is intended.    Pertinent diagnostic labs and testing reviewed    Informed Consent:    Anesthetic plan and risks discussed with patient.    Use of blood products discussed with: patient whom consented to blood products.

## 2022-08-11 NOTE — PROGRESS NOTES
Pt admitted to R105 at 1236, drowsy up arouses to verbal command, Ox4, ARDON no c/o numbness or decrease sensation. SBP via artline 200's cuff pressure  notified Dr. Gonda and order received to treat off artline. Pt c/o pain medicated per PRN mar post bedside swallow eval. Oriented to room and call light.

## 2022-08-11 NOTE — ASSESSMENT & PLAN NOTE
S/p ethmoidectomy, septoplasty, inferior turbinate resection 8/11  As needed nasal spray when cleared by ENT  Completed perioperative antibiotics

## 2022-08-11 NOTE — ANESTHESIA TIME REPORT
Anesthesia Start and Stop Event Times     Date Time Event    8/11/2022 0654 Ready for Procedure     0718 Anesthesia Start     1040 Anesthesia Stop        Responsible Staff  08/11/22    Name Role Begin End    Tobey Gansert, M.D. Anesth 0718 1040        Overtime Reason:  no overtime (within assigned shift)    Comments:

## 2022-08-11 NOTE — OP REPORT
DATE OF SERVICE: 08/11/22       PREOPERATIVE DIAGNOSES:  1.  Sellar mass.  2. Chronic maxillary sinusitis  3. Chronic ethmoid sinusitis  4. Chronic frontal sinusitis  5. Nasal septal deviation  6. Inferior turbinate hypertrophy     POSTOPERATIVE DIAGNOSIS:  1.  Sellar mass.  2. Chronic maxillary sinusitis  3. Chronic ethmoid sinusitis  4. Chronic frontal sinusitis  5. Nasal septal deviation  6. Inferior turbinate hypertrophy  7.  Nasal polyps     OPERATION PERFORMED:  1.  Transsphenoidal endoscopic resection of sellar mass.  2.  Outfracture of inferior turbinates.  3.  Bilateral maxillary antrostomy.  4.  Bilateral total ethmoidectomy.  5.  Septoplasty   6.  Bilateral Inferior turbinate submucous resection with outfracture  7.  Utilization of image guidance, intradural.     SURGEON:  Mary Pena MD       COSURGEON:  Norman Mejia MD     ANESTHESIA:  General.     ANESTHESOLOGIST: Anesthesiologist: Tobey Gansert, M.D.     SPECIMEN:  Nasal contents and sellar mass to pathology.     ESTIMATED BLOOD LOSS:  100mL.     COMPLICATIONS:  None.     FINDINGS:  1.  Large sellar mass with expansion into the sphenoid sinus.  2.  No evidence of CSF rhinorrhea  3.  Small polyps and polypoid change throughout maxillary and ethmoid sinsues  4.  Septal deviation to the left  5.  Inferior turbinate hypertrophy.     INDICATIONS:  The patient is a 42-year-old female with a sellar mass and chronic sinus infections, pressure, drainage which has all been unresponsive to medical therapy.  The above stated procedures were offered and the patient desired strongly to proceed.  These were explained in detail.  Risks were discussed including but not limited to pain, bleeding,   infection, scar formation, postoperative sinusitis, CSF leak, damage to the orbit or skull base, worsening vision, septal perforation, decreased sense of smell, failure to improve and need for further procedures.  Informed surgical consent was   obtained.      DESCRIPTION OF PROCEDURE:  The patient was met in preoperative holding area.    Again, the consent and history were reviewed.  She was brought to the operating   room in good condition  after appropriate anesthetic markers were placed.  A   surgical time-out was taken and general endotracheal anesthesia was induced.    The Stealth navigation image guidance system was calibrated and noted to be   functioning properly. Lidocaine 1% with epinephrine was infiltrated into the septum for local anesthesia and topical   epinephrine-soaked cottonoids were placed into the nose for decongestion.  The patient was then prepped and draped in the usual sterile fashion.  I first began by making a hemitransfixion incision along the caudal edge of the   septum on the left hand side.  The subperichondrial plane was identified and   this plane was elevated widely initially on the left  hand side.  A transcartilaginous incision was then made and the contralateral flap was elevated. The deviated portions of the cartilage and bone were then addressed. Care was taken to leave at least a 2cm caudal strut. Both septal flaps were intact, but there was a small rent anteriorly on the left. This created a   widely patent airway.      I then proceeded with the sinus dissection first beginning on the left hand side.  The middle turbinate was gently   medialized.  The uncinate process was teased forward and removed sharply.  A 30-degree endoscope was utilized to ensure that the natural ostium of the maxillary sinus was incorporated into a large antrostomy and to ensure   that the uncinate was completely removed. There was polypoid disease in the maxillary sinus. The ethmoids were then dissected. The ethmoid bulla was taken down followed by partitions of the anterior ethmoids and then the basal lamella was traversed. Partitions were removed using a combination of cutting instruments and the microdebrider. The inferior third of the superior  turbinate was then resected and the natural ostium of the sphenoid sinus was identified and enlarged. The skull base and orbit were identified in the sphenoid and posterior ethmoid cells and with the assistance of image guidance, dissection continued from posterior to anterior removing all ethmoid partitions. A 30 degree endoscope was utilized to dissect along the skull base anteriorly. The location of the anterior ethmoid artery was identified and preserved. I then proceeded to the right side and dissection was completed in the same fashion. The middle turbinate was medialized.  The uncinate was   completely removed.  The 30-degree endoscope was utilized to ensure that the natural ostium was incorporated into the large antrostomy. The anterior and posterior ethmoids were entered and dissected. Norbert polyps were removed from the ethmoid sinuses and middle meatus. The inferior third of the superior turbinate was removed and the natural ostium of the sphenoid sinus was opened widely. The tumor was encountered in the sphenoid which had eroded through the sellar face. The skull base and orbit were identified posteriorly and, respecting these landmarks, all ethmoid partitions were removed working from posterior to anterior. The image guidance navigation was utilized to confirm landmarks, particularly along the skull base. The left middle turbinate was partially resected for increased access.     Next the inferior turbinates were addressed.  First the right turbinate was addressed. It was injected with an additional 1cc of lido/epi.  A small submucosal pocket was created with a  freer.  Next the microdebrider was used to perform a submucosal reduction.  After which the turbinate was lateralized with a freer elevator. A small portion of the hypertrophied turbinate was trimmed as it was severely obstructing. The posterior mullberry tip was cauterized using suction bovie.  Next, the left turbinate was reduced in a similar  fashion.    I then turned my attention towards the sphenoids.  The mucosa overlying the inferior portion of the sphenoid face was elevated inferiorly taking care to preserve the posterior septal artery bilaterally.  A posterior septectomy was   then performed, taking care to take only a portion of the septum that was needed for visualization.  The bilateral sphenoid ostia were then joined in the midline and the sphenoid rostrum was removed using a combination of Kerrison forceps and a through biting forceps.  Again, the posterior septal branch of the sphenopalatine artery was preserved.  Once the expansile sellar   mass was adequately visualized, landmarks were confirmed using the Stealth image guidance system including location of the carotids bilaterally.  The mucosa overlying the sellar mass was adherent to the dura on the right overlying the large expansile mass. The case was then turned over to Dr. Mejia for resection of the pituitary tumor.  Please see his   operative report for details of his portion of the procedure.  Again, the stealth navigation system was utilized to confirm known landmarks and to ensure adequate decompression.  The nose was copiously irrigated using normal   saline.  Hemostasis was ensured. The sellar defect was covered using Surgicel and DuraSeal. This was then further bolstered using Gelfoam. The right middle turbinate was then gently medialized to prevent postoperative obstruction.      I then turned my attention towards closure.  The hemitransfixion incision was closed using a 5-0 fast absorbing gut and a standard quilting stitch was placed using a 4-0 Chromic. Nasopore was then placed into the sinus cavities bilaterally. An orogastric tube was passed to suction out the stomach contents.  The procedure was then terminated.  Care of the patient was turned over to anesthesia for emergence and extubation.  She was taken to the PACU in stable condition.  All instrument counts were  correct x2 at the conclusion of the case.  Again, there were no apparent complications.        ____________________________________     Mary Pena MD

## 2022-08-11 NOTE — PROGRESS NOTES
Spiritual Care Note        Patient's Name: Ernesto Ramos   MRN: [id number]    YOB: 1980   Age and Gender: 42 y.o. female   Unit/Service Area: [facility]   Room (and Bed): [facility]   Ethnicity or Nationality:     Primary Language: English   Rastafari/Spiritual Preference: Non-Rastafari   Place of Residence: [place], [place]   Medical Diagnoses/Procedures: pituitary macroadenoma   hypertension  morbid obesity   chronic sinusitis  s/p  ethmoidectomy  8/11  s/p  septoplasty  8/11  s/p  inferior turbinate resection  8/11   Code Status: Full Code    Date of Admission: 8/11/2022   Length of Stay: 0 days        Spiritual Screen Results  Would you like ta visit from our Spiritual Care team/your own Presybeterian/spiritual leader?   Yes ()       Nature of the Visit:   Initial    Surgical Visit:   Post-Op    Referral from/Origin of Request:   Epic, nursing order    Referral to:   Community clergy ()    Interventions:   Spoke to Fr. Zoran Vogel who will visit today.      Spiritual Care Provider   Chaplain JOSE ALEJANDRO Goddard  [phone]   [email or web address]

## 2022-08-11 NOTE — ASSESSMENT & PLAN NOTE
No prescribed outpatient therapy -improving  As needed labetalol and hydralazine for goal SBP less than 160

## 2022-08-11 NOTE — OR NURSING
1041: Patient arrived to unit, asleep, oral airway in place. VSS. No drainage noted from surgical site.    1050: Dr. Mejia at bedside, dc lumbar drain.    1100: Patient friend updated via phone.    1215: VSS. Patient alert and oriented. Moving all extremities. Patient states pain is mild and tolerable. Report provided to Lauren.    1220: Patient transported off unit with RN and CNA with personal belongings.

## 2022-08-11 NOTE — ANESTHESIA PROCEDURE NOTES
Arterial Line  Performed by: Tobey Gansert, M.D.  Authorized by: Tobey Gansert, M.D.     Start Time:  8/11/2022 7:15 AM  End Time:  8/11/2022 7:18 AM  Localization: surface landmarks    Patient Location:  OR  Indication: continuous blood pressure monitoring        Catheter Size:  20 G  Seldinger Technique?: Yes    Laterality:  Right  Site:  Radial artery  Line Secured:  Antimicrobial disc, tape and transparent dressing  Events: patient tolerated procedure well with no complications

## 2022-08-11 NOTE — ASSESSMENT & PLAN NOTE
S/p transsphenoidal resection 8/11 by Dr. Mejia  Continue routine postoperative management  Monitor for CSF leak  As needed analgesics  Goal SBP less than 160  No need for cortisol replacement treatment

## 2022-08-11 NOTE — ANESTHESIA PROCEDURE NOTES
Airway    Date/Time: 8/11/2022 7:23 AM  Performed by: Tobey Gansert, M.D.  Authorized by: Tobey Gansert, M.D.     Location:  OR  Urgency:  Elective  Indications for Airway Management:  Anesthesia      Spontaneous Ventilation: absent    Sedation Level:  Deep  Preoxygenated: Yes    Patient Position:  Sniffing  Final Airway Type:  Endotracheal airway  Final Endotracheal Airway:  LOPEZ tube  Cuffed: Yes    Technique Used for Successful ETT Placement:  Direct laryngoscopy    Insertion Site:  Oral  Blade Type:  Glide  Laryngoscope Blade/Videolaryngoscope Blade Size:  3  Measured from:  Teeth  ETT to Teeth (cm):  22  Placement Verified by: auscultation and capnometry    Cormack-Lehane Classification:  Grade I - full view of glottis  Number of Attempts at Approach:  1

## 2022-08-12 ENCOUNTER — APPOINTMENT (OUTPATIENT)
Dept: RADIOLOGY | Facility: MEDICAL CENTER | Age: 42
DRG: 614 | End: 2022-08-12
Attending: INTERNAL MEDICINE
Payer: MEDICAID

## 2022-08-12 LAB
ANION GAP SERPL CALC-SCNC: 13 MMOL/L (ref 7–16)
BUN SERPL-MCNC: 13 MG/DL (ref 8–22)
CALCIUM SERPL-MCNC: 8.9 MG/DL (ref 8.5–10.5)
CHLORIDE SERPL-SCNC: 104 MMOL/L (ref 96–112)
CO2 SERPL-SCNC: 20 MMOL/L (ref 20–33)
CORTIS SERPL-MCNC: 19.3 UG/DL (ref 0–23)
CREAT SERPL-MCNC: 1.11 MG/DL (ref 0.5–1.4)
ERYTHROCYTE [DISTWIDTH] IN BLOOD BY AUTOMATED COUNT: 50.3 FL (ref 35.9–50)
GFR SERPLBLD CREATININE-BSD FMLA CKD-EPI: 63 ML/MIN/1.73 M 2
GLUCOSE SERPL-MCNC: 149 MG/DL (ref 65–99)
HCT VFR BLD AUTO: 37.7 % (ref 37–47)
HGB BLD-MCNC: 12.9 G/DL (ref 12–16)
MCH RBC QN AUTO: 28.3 PG (ref 27–33)
MCHC RBC AUTO-ENTMCNC: 34.2 G/DL (ref 33.6–35)
MCV RBC AUTO: 82.7 FL (ref 81.4–97.8)
PLATELET # BLD AUTO: 299 K/UL (ref 164–446)
PMV BLD AUTO: 10.7 FL (ref 9–12.9)
POTASSIUM SERPL-SCNC: 4.1 MMOL/L (ref 3.6–5.5)
RBC # BLD AUTO: 4.56 M/UL (ref 4.2–5.4)
SODIUM SERPL-SCNC: 137 MMOL/L (ref 135–145)
WBC # BLD AUTO: 11.4 K/UL (ref 4.8–10.8)

## 2022-08-12 PROCEDURE — 71045 X-RAY EXAM CHEST 1 VIEW: CPT

## 2022-08-12 PROCEDURE — 80048 BASIC METABOLIC PNL TOTAL CA: CPT

## 2022-08-12 PROCEDURE — A9270 NON-COVERED ITEM OR SERVICE: HCPCS | Performed by: INTERNAL MEDICINE

## 2022-08-12 PROCEDURE — 82533 TOTAL CORTISOL: CPT

## 2022-08-12 PROCEDURE — 97165 OT EVAL LOW COMPLEX 30 MIN: CPT

## 2022-08-12 PROCEDURE — 700102 HCHG RX REV CODE 250 W/ 637 OVERRIDE(OP): Performed by: NURSE PRACTITIONER

## 2022-08-12 PROCEDURE — 770001 HCHG ROOM/CARE - MED/SURG/GYN PRIV*

## 2022-08-12 PROCEDURE — 85027 COMPLETE CBC AUTOMATED: CPT

## 2022-08-12 PROCEDURE — A9270 NON-COVERED ITEM OR SERVICE: HCPCS | Performed by: NURSE PRACTITIONER

## 2022-08-12 PROCEDURE — 97163 PT EVAL HIGH COMPLEX 45 MIN: CPT

## 2022-08-12 PROCEDURE — 700102 HCHG RX REV CODE 250 W/ 637 OVERRIDE(OP): Performed by: INTERNAL MEDICINE

## 2022-08-12 PROCEDURE — 99233 SBSQ HOSP IP/OBS HIGH 50: CPT | Performed by: INTERNAL MEDICINE

## 2022-08-12 RX ADMIN — DOCUSATE SODIUM 100 MG: 100 CAPSULE, LIQUID FILLED ORAL at 17:12

## 2022-08-12 RX ADMIN — ACETAMINOPHEN 1000 MG: 500 TABLET ORAL at 20:23

## 2022-08-12 RX ADMIN — LEVOTHYROXINE SODIUM 50 MCG: 0.05 TABLET ORAL at 05:18

## 2022-08-12 RX ADMIN — OXYCODONE HYDROCHLORIDE 10 MG: 10 TABLET ORAL at 10:25

## 2022-08-12 RX ADMIN — POLYETHYLENE GLYCOL 3350 1 PACKET: 17 POWDER, FOR SOLUTION ORAL at 17:12

## 2022-08-12 RX ADMIN — ACETAMINOPHEN 1000 MG: 500 TABLET ORAL at 05:19

## 2022-08-12 RX ADMIN — SENNOSIDES AND DOCUSATE SODIUM 2 TABLET: 50; 8.6 TABLET ORAL at 17:11

## 2022-08-12 RX ADMIN — SENNOSIDES AND DOCUSATE SODIUM 2 TABLET: 50; 8.6 TABLET ORAL at 05:19

## 2022-08-12 RX ADMIN — BISACODYL 10 MG: 10 SUPPOSITORY RECTAL at 05:19

## 2022-08-12 RX ADMIN — DOCUSATE SODIUM 100 MG: 100 CAPSULE, LIQUID FILLED ORAL at 05:18

## 2022-08-12 RX ADMIN — OXYCODONE HYDROCHLORIDE 10 MG: 10 TABLET ORAL at 22:29

## 2022-08-12 ASSESSMENT — ENCOUNTER SYMPTOMS
FEVER: 0
NAUSEA: 0
BACK PAIN: 0
VOMITING: 0
SHORTNESS OF BREATH: 0
COUGH: 0
MYALGIAS: 0
SENSORY CHANGE: 0
BLURRED VISION: 0
HEADACHES: 0
ABDOMINAL PAIN: 0
CHILLS: 0
BRUISES/BLEEDS EASILY: 0
DEPRESSION: 0
PALPITATIONS: 0
NERVOUS/ANXIOUS: 0
DIZZINESS: 0
SORE THROAT: 0
SPUTUM PRODUCTION: 0
SPEECH CHANGE: 0

## 2022-08-12 ASSESSMENT — COGNITIVE AND FUNCTIONAL STATUS - GENERAL
WALKING IN HOSPITAL ROOM: A LITTLE
DAILY ACTIVITIY SCORE: 22
TURNING FROM BACK TO SIDE WHILE IN FLAT BAD: A LITTLE
STANDING UP FROM CHAIR USING ARMS: A LITTLE
SUGGESTED CMS G CODE MODIFIER DAILY ACTIVITY: CJ
SUGGESTED CMS G CODE MODIFIER MOBILITY: CK
MOVING FROM LYING ON BACK TO SITTING ON SIDE OF FLAT BED: A LITTLE
HELP NEEDED FOR BATHING: A LITTLE
CLIMB 3 TO 5 STEPS WITH RAILING: A LITTLE
DRESSING REGULAR LOWER BODY CLOTHING: A LITTLE
MOVING TO AND FROM BED TO CHAIR: A LITTLE
MOBILITY SCORE: 18

## 2022-08-12 ASSESSMENT — PAIN DESCRIPTION - PAIN TYPE
TYPE: SURGICAL PAIN;ACUTE PAIN
TYPE: SURGICAL PAIN

## 2022-08-12 ASSESSMENT — GAIT ASSESSMENTS
DISTANCE (FEET): 150
DEVIATION: BRADYKINETIC
GAIT LEVEL OF ASSIST: STANDBY ASSIST

## 2022-08-12 ASSESSMENT — ACTIVITIES OF DAILY LIVING (ADL): TOILETING: INDEPENDENT

## 2022-08-12 NOTE — NON-PROVIDER
Case Management Discharge Planning    Admission Date: 8/11/2022  GMLOS: 1.8  ALOS: 1    6-Clicks ADL Score:    6-Clicks Mobility Score:        Anticipated Discharge Dispo: Discharge Disposition: Disch to IP rehab facility or distinct part unit (62)    DME Needed: No    Action(s) Taken: DC Assessment Complete (See below)    Escalations Completed: None    Medically Clear: No    Next Steps: Medical records reviewed by this MSN student.  Patient lives alone in single story apartment in [place]. She has support from friends.  No previous services/DME through chart review.  Her primary care provider is Dr. Leticia Houser.  She uses the [facility] pharmacy on [address]  She has [employer] Medicaid insurance.  Patient pending therapy evals.  HCM to continue to follow for therapy evals/recs and assist with discharge planning as appropriate.     Barriers to Discharge: Medical clearance and Pending PT Evaluation    Is the patient up for discharge tomorrow: No    Care Transition Team Assessment  Emergency Contact  Chuyita (friend) [phone]    Information Source: medical records review   Orientation Level: Oriented X4  Information Given By: Other (Comments)  Informant's Name: EHR/chart review  Who is responsible for making decisions for patient? : Patient    Readmission Evaluation  Is this a readmission?: No    Elopement Risk  Legal Hold: No  Ambulatory or Self Mobile in Wheelchair: No-Not an Elopement Risk    Interdisciplinary Discharge Planning  Lives with - Patient's Self Care Capacity: Alone and Able to Care For Self  Patient or legal guardian wants to designate a caregiver: No  Support Systems: Friends / Neighbors  Housing / Facility: 1 Story Apartment / Condo  Durable Medical Equipment: Not Applicable    Discharge Preparedness  What is your plan after discharge?: Uncertain - pending medical team collaboration  What are your discharge supports?: Other (comment) (friends)  Prior Functional Level: Ambulatory, Independent with  Activities of Daily Living  Difficulity with ADLs: None  Difficulity with IADLs: None    Functional Assesment  Prior Functional Level: Ambulatory, Independent with Activities of Daily Living    Finances  Financial Barriers to Discharge: No  Prescription Coverage: Yes    Vision / Hearing Impairment  Vision Impairment : No  Right Eye Vision:  (improved post surgery per patient, no more pressure)  Hearing Impairment : No    Advance Directive  Advance Directive?: None    Domestic Abuse  Have you ever been the victim of abuse or violence?: No  Physical Abuse or Sexual Abuse: No  Verbal Abuse or Emotional Abuse: No  Possible Abuse/Neglect Reported to:: Not Applicable    Psychological Assessment  History of Substance Abuse: None  History of Psychiatric Problems: No  Non-compliant with Treatment: No  Newly Diagnosed Illness: Yes    Discharge Risks or Barriers  Discharge risks or barriers?: Post-acute placement / services, Lives alone, no community support, Complex medical needs  Patient risk factors: Lives alone and no community support, Complex medical needs, No PCP    Anticipated Discharge Information  Discharge Disposition: Disch to IP rehab facility or distinct part unit (62)    Note Authored by:  Delicia Littlejohn RN, MSN Care Coordination Student  Reviewed by:  STACY Coello RN

## 2022-08-12 NOTE — PROGRESS NOTES
4 Eyes Skin Assessment Completed by Lauren RN and Michelle RN.    Head WDL  Ears WDL  Nose WDL  Mouth WDL  Neck WDL  Breast/Chest WDL  Shoulder Blades WDL  Spine WDL  (R) Arm/Elbow/Hand WDL  (L) Arm/Elbow/Hand WDL  Abdomen WDL  Groin Excoriation, prior history of skin condition  Scrotum/Coccyx/Buttocks WDL  (R) Leg WDL  (L) Leg WDL  (R) Heel/Foot/Toe WDL  (L) Heel/Foot/Toe WDL          Devices In Places ECG, Tele Box, Blood Pressure Cuff, Pulse Ox, Black, Arterial Line, SCD's, and Oxy Mask      Interventions In Place Pillows, Q2 Turns, Dri-Harjit Pads, and Pressure Redistribution Mattress    Possible Skin Injury No    Pictures Uploaded Into Epic N/A  Wound Consult Placed N/A  RN Wound Prevention Protocol Ordered Yes

## 2022-08-12 NOTE — THERAPY
Occupational Therapy   Initial Evaluation     Patient Name: Ernesto Ramos  Age:  42 y.o., Sex:  female  Medical Record #: [id number]  Today's Date: 8/12/2022     Precautions  Precautions: Fall Risk    Assessment  Patient is 42 y.o. female with sellar mass and chronic sinus infections. Pt is now s/p transsphenoidal pituitary endoscopic resection of sellar mass, B maxillary antrostomy, septoplasty, and B total ethmoidectomy. Pt normally independent with all functional mobility and ADLs at baseline living in a GL apartment alone. Pt able to complete all functional mobility and ADLs with supervision, no AD required. Anticipate pt is at her functional baseline, will complete OT order at this time.     Plan    Recommend Occupational Therapy for Evaluation only.    DC Equipment Recommendations: (P) None  Discharge Recommendations: (P) Anticipate that the patient will have no further occupational therapy needs after discharge from the hospital     Objective       08/12/22 1113   Prior Living Situation   Prior Services None   Housing / Facility 1 Story Apartment / Condo   Steps Into Home 0   Steps In Home 0   Bathroom Set up Bathtub / Shower Combination   Equipment Owned None   Lives with - Patient's Self Care Capacity Alone and Able to Care For Self   Prior Level of ADL Function   Self Feeding Independent   Grooming / Hygiene Independent   Bathing Independent   Dressing Independent   Toileting Independent   Prior Level of IADL Function   Medication Management Independent   Laundry Independent   Kitchen Mobility Independent   Finances Independent   Home Management Independent   Shopping Independent   Prior Level Of Mobility Independent Without Device in Community   Driving / Transportation Driving Independent   Occupation (Pre-Hospital Vocational) Employed Full Time  ()   History of Falls   History of Falls No   Pain 0 - 10 Group   Therapist Pain Assessment Post Activity Pain Same as Prior to Activity;Nurse  Notified   Cognition    Cognition / Consciousness WDL   Level of Consciousness Alert   Active ROM Upper Body   Active ROM Upper Body  WDL   Dominant Hand Right   Strength Upper Body   Upper Body Strength  WDL   Sensation Upper Body   Upper Extremity Sensation  WDL   Upper Body Muscle Tone   Upper Body Muscle Tone  WDL   Neurological Concerns   Neurological Concerns No   Coordination Upper Body   Coordination WDL   Balance Assessment   Sitting Balance (Static) Fair +   Sitting Balance (Dynamic) Fair   Standing Balance (Static) Fair   Standing Balance (Dynamic) Fair   Weight Shift Sitting Good   Weight Shift Standing Fair   Comments no AD   Bed Mobility    Supine to Sit Standby Assist   Sit to Supine Standby Assist   Scooting Standby Assist   Rolling Standby Assist   ADL Assessment   Grooming Supervision   Upper Body Dressing Supervision   Lower Body Dressing Supervision   Toileting   (NT-refused need, cath in place)   How much help from another person does the patient currently need...   Putting on and taking off regular lower body clothing? 3   Bathing (including washing, rinsing, and drying)? 3   Toileting, which includes using a toilet, bedpan, or urinal? 4   Putting on and taking off regular upper body clothing? 4   Taking care of personal grooming such as brushing teeth? 4   Eating meals? 4   6 Clicks Daily Activity Score 22   Functional Mobility   Sit to Stand Standby Assist   Bed, Chair, Wheelchair Transfer Standby Assist   Transfer Method Stand Step   Mobility bed mobility, hallway mobility, back to bed   Comments no AD   ICU Target Mobility Level   ICU Mobility - Targeted Level Level 4   Activity Tolerance   Sitting Edge of Bed 5 min   Standing 10 min   Education Group   Education Provided Role of Occupational Therapist   Role of Occupational Therapist Patient Response Patient;Acceptance;Explanation   Problem List   Problem List None   Anticipated Discharge Equipment and Recommendations   DC Equipment  Recommendations None   Discharge Recommendations Anticipate that the patient will have no further occupational therapy needs after discharge from the hospital   Interdisciplinary Plan of Care Collaboration   IDT Collaboration with  Nursing;Physical Therapist   Patient Position at End of Therapy In Bed;Call Light within Reach;Phone within Reach;Tray Table within Reach   Collaboration Comments RN updated

## 2022-08-12 NOTE — THERAPY
Physical Therapy   Initial Evaluation     Patient Name: Ernesto Ramos  Age:  42 y.o., Sex:  female  Medical Record #: [id number]  Today's Date: 8/12/2022     Precautions  Precautions: Fall Risk    Assessment  Pt is a 42 year old female with sellar mass and chronic sinus infections. Pt is now s/p transsphenoidal pituitary endoscopic resection of sellar mass, B maxillary antrostomy, septoplasty, and B total ethmoidectomy.   Pt was able to mobilize as detailed below. Pt moved with  bradykinetic characteristics, but pt states this is due to pain from catheter. Pt educated on performing mobilization with nursing staff to help prevent constipation. Pt stated understanding. Pt currently has no acute physical therapy needs.       Plan    Recommend Physical Therapy for Evaluation only    DC Equipment Recommendations: None  Discharge Recommendations: Anticipate that the patient will have no further physical therapy needs after discharge from the hospital       Objective     08/12/22 1111   Initial Contact Note    Initial Contact Note Order Received and Verified, Evaluation Only - Patient Does Not Require Further Acute Physical Therapy at this Time.  However, May Benefit from Post Acute Therapy for Higher Level Functional Deficits.   Precautions   Precautions Fall Risk   Pain 0 - 10 Group   Therapist Pain Assessment Nurse Notified;Post Activity Pain Same as Prior to Activity  (any movement with catheter was very painful for the patient)   Prior Living Situation   Prior Services None   Housing / Facility 1 Story Apartment / Condo   Steps Into Home 0   Steps In Home 0   Equipment Owned None   Lives with - Patient's Self Care Capacity Alone and Able to Care For Self   Prior Level of Functional Mobility   Bed Mobility Independent   Transfer Status Independent   Ambulation Independent   Distance Ambulation (Feet)   (community)   Assistive Devices Used None   Stairs Independent   Cognition    Level of Consciousness Alert    Passive ROM Lower Body   Passive ROM Lower Body WDL   Comments Pt limited of full ROM due to body habitus, but is functional with B LE ROM for mobility   Active ROM Lower Body    Active ROM Lower Body  WDL   Strength Lower Body   Lower Body Strength  WDL   Comments B LE4+/5   Sensation Lower Body   Lower Extremity Sensation   WDL   Lower Body Muscle Tone   Lower Body Muscle Tone  WDL   Coordination Lower Body    Coordination Lower Body  WDL   Balance Assessment   Sitting Balance (Static) Fair +   Sitting Balance (Dynamic) Fair   Standing Balance (Static) Fair   Standing Balance (Dynamic) Fair   Weight Shift Sitting Good   Weight Shift Standing Fair   Gait Analysis   Gait Level Of Assist Standby Assist   Assistive Device None   Distance (Feet) 150   # of Times Distance was Traveled 1   Deviation Bradykinetic   Weight Bearing Status No restrictions   Bed Mobility    Supine to Sit Standby Assist   Sit to Supine Standby Assist   Scooting Standby Assist   Rolling Standby Assist   Functional Mobility   Sit to Stand Standby Assist   Bed, Chair, Wheelchair Transfer Standby Assist   Transfer Method Stand Step   Mobility sup to sit, sts, gait, sit to sup   How much difficulty does the patient currently have...   Turning over in bed (including adjusting bedclothes, sheets and blankets)? 3   Sitting down on and standing up from a chair with arms (e.g., wheelchair, bedside commode, etc.) 3   Moving from lying on back to sitting on the side of the bed? 3   How much help from another person does the patient currently need...   Moving to and from a bed to a chair (including a wheelchair)? 3   Need to walk in a hospital room? 3   Climbing 3-5 steps with a railing? 3   6 clicks Mobility Score 18   Activity Tolerance   Sitting Edge of Bed 5 min   Standing 10 min   Education Group   Education Provided Role of Physical Therapist   Role of Physical Therapist Patient Response Patient;Acceptance;Explanation;Verbal Demonstration    Anticipated Discharge Equipment and Recommendations   DC Equipment Recommendations None   Discharge Recommendations Anticipate that the patient will have no further physical therapy needs after discharge from the hospital   Interdisciplinary Plan of Care Collaboration   IDT Collaboration with  Nursing;Occupational Therapist   Patient Position at End of Therapy In Bed;Tray Table within Reach;Phone within Reach;Call Light within Reach   Collaboration Comments PT findings   Session Information   Date / Session Number  8/12- eval only

## 2022-08-12 NOTE — DIETARY
NUTRITION SERVICES: BMI - Pt with BMI >40 (=Body mass index is 59.22 kg/m².), Class III obesity. Weight loss counseling not appropriate in acute care setting. RECOMMEND - If appropriate at DC please refer to outpatient nutrition services for weight management.

## 2022-08-12 NOTE — PROGRESS NOTES
Critical Care Progress Note    Date of admission  8/11/2022    Chief Complaint  42 y.o. female admitted 8/11/2022 with pituitary tumor    Hospital Course  42 y.o. female who presented 8/11/2022 with a past medical history significant for allergic rhinitis who began having headaches in 2020.  She eventually developed worsening headaches and vision changes, had weight gain, and her menstrual cycle stopped.  An MRI of her brain showed a suprasellar/sellar mass with cavernous sinus invasion.  She was referred to ENT and neurosurgery and today underwent an elective transsphenoidal pituitary resection by neurosurgery Dr. Mejia and assisted by ENT Dr. Pena who also performed bilateral maxillary antrostomy, total ethmoidectomy, septoplasty, and inferior turbinate resection.  Surgery was uncomplicated and she was brought to the intensive care unit post procedure and I was consulted for perioperative critical care management.    Interval Problem Update  Reviewed last 24 hour events:   - oxygen weaned down to room air   - small nasal drainage   - post-op CT head showing expected post-op changes   - neuro intact   - SBP improving    Review of Systems  Review of Systems   Constitutional:  Negative for chills, fever and malaise/fatigue.   HENT:  Negative for congestion and sore throat.    Eyes:  Negative for blurred vision.   Respiratory:  Negative for cough, sputum production and shortness of breath.    Cardiovascular:  Negative for chest pain, palpitations and leg swelling.   Gastrointestinal:  Negative for abdominal pain, nausea and vomiting.   Genitourinary:  Negative for dysuria.   Musculoskeletal:  Negative for back pain and myalgias.   Skin:  Negative for rash.   Neurological:  Negative for dizziness, sensory change, speech change and headaches.   Endo/Heme/Allergies:  Does not bruise/bleed easily.   Psychiatric/Behavioral:  Negative for depression. The patient is not nervous/anxious.    All other systems reviewed and  are negative.     Vital Signs for last 24 hours   Temp:  [35.9 °C (96.6 °F)-36.2 °C (97.2 °F)] 36.2 °C (97.2 °F)  Pulse:  [71-98] 79  Resp:  [9-50] 50  BP: (108-177)/() 121/82  SpO2:  [86 %-100 %] 95 %    Respiratory Information for the last 24 hours  Room air    Physical Exam   Physical Exam  Vitals and nursing note reviewed.   Constitutional:       General: She is not in acute distress.     Appearance: She is well-developed. She is obese.      Comments: Pleasant, cooperative   HENT:      Head: Normocephalic and atraumatic.      Nose:      Comments: Dressing under nose with minimal bloody drainage  Eyes:      General: No scleral icterus.     Extraocular Movements: Extraocular movements intact.      Conjunctiva/sclera: Conjunctivae normal.      Pupils: Pupils are equal, round, and reactive to light.   Neck:      Vascular: No JVD.      Trachea: No tracheal deviation.   Cardiovascular:      Rate and Rhythm: Normal rate and regular rhythm.      Heart sounds: Normal heart sounds. No murmur heard.    No friction rub.   Pulmonary:      Effort: Pulmonary effort is normal. No respiratory distress.      Breath sounds: Normal breath sounds. No wheezing.   Abdominal:      General: Bowel sounds are normal. There is no distension.      Palpations: Abdomen is soft.      Tenderness: There is no abdominal tenderness.   Musculoskeletal:         General: No tenderness.      Cervical back: Neck supple.   Skin:     General: Skin is warm and dry.      Capillary Refill: Capillary refill takes less than 2 seconds.      Findings: No rash.   Neurological:      General: No focal deficit present.      Mental Status: She is alert and oriented to person, place, and time.      Cranial Nerves: No cranial nerve deficit.      Sensory: No sensory deficit.   Psychiatric:         Behavior: Behavior normal.         Thought Content: Thought content normal.       Medications  Current Facility-Administered Medications   Medication Dose Route  Frequency Provider Last Rate Last Admin    levothyroxine (SYNTHROID) tablet 50 mcg  50 mcg Oral AM ES OMEGA JacobsP.R.N.   50 mcg at 08/12/22 0518    fluticasone (FLOVENT HFA) 110 MCG/ACT inhaler 220 mcg  2 Puff Inhalation BID (RT) OMEGA JacobsP.R.ALEX        Pharmacy Consult Request ...Pain Management Review 1 Each  1 Each Other PHARMACY TO DOSE MARAL Jacobs MD ALERT...DO NOT ADMINISTER NSAIDS or ASPIRIN unless ORDERED By Neurosurgery 1 Each  1 Each Other PRN ANKIT Jacobs.P.R.N.        ondansetron (ZOFRAN) syringe/vial injection 4 mg  4 mg Intravenous Q4HRS PRN ANKIT Jacobs.P.R.N.        dexamethasone (DECADRON) injection 4 mg  4 mg Intravenous Once PRN ANKIT Jacobs.P.R.N.        diphenhydrAMINE (BENADRYL) injection 25 mg  25 mg Intravenous Q6HRS PRN ANKIT Jacobs.P.R.DELVIN.        scopolamine (TRANSDERM-SCOP) patch 1 Patch  1 Patch Transdermal Q72HRS PRN ANKIT Jacobs.P.R.DELVIN.        docusate sodium (COLACE) capsule 100 mg  100 mg Oral BID ANKIT Jacobs.P.R.N.   100 mg at 08/12/22 0518    senna-docusate (PERICOLACE or SENOKOT S) 8.6-50 MG per tablet 1 Tablet  1 Tablet Oral Q24HRS PRN ANKIT Jacobs.P.R.N.        bisacodyl (DULCOLAX) suppository 10 mg  10 mg Rectal Q24HRS PRN ANKIT Jacobs.P.R.N.        sodium phosphate (Fleet) enema 133 mL  1 Each Rectal Once PRN ANKIT Jacobs.P.R.N.        0.9 % NaCl with KCl 20 mEq infusion   Intravenous Continuous OMEGA JacobsP.R.N.   Stopped at 08/11/22 2300    acetaminophen (TYLENOL) tablet 1,000 mg  1,000 mg Oral Q8HRS ANKIT Jacobs.P.R.N.   1,000 mg at 08/12/22 0519    oxyCODONE immediate-release (ROXICODONE) tablet 5 mg  5 mg Oral Q3HRS PRN Vitaly Stewart, A.P.R.N.        Or    oxyCODONE immediate release (ROXICODONE) tablet 10 mg  10 mg Oral Q3HRS PRN Vitaly Stewart A.P.R.N.   10 mg at 08/11/22 2226    Or    HYDROmorphone (Dilaudid) injection 0.5 mg  0.5 mg Intravenous Q3HRS PRN Vitaly Stewart, A.P.R.N.        acetaminophen (Tylenol) tablet 650 mg  650 mg Oral Q6HRS PRN ANKIT Bowie.P.R.ALEX [facility] [place]  (PERICOLACE or SENOKOT S) 8.6-50 MG per tablet 2 Tablet  2 Tablet Oral BID Jeremy M Gonda, M.D.   2 Tablet at 08/12/22 0519    And    polyethylene glycol/lytes (MIRALAX) PACKET 1 Packet  1 Packet Oral QDAY PRN Jeremy M Gonda, M.D.        And    magnesium hydroxide (MILK OF MAGNESIA) suspension 30 mL  30 mL Oral QDAY PRN Jeremy M Gonda, M.D.   30 mL at 08/11/22 1615    And    bisacodyl (DULCOLAX) suppository 10 mg  10 mg Rectal QDAY PRN Jeremy M Gonda, M.D.   10 mg at 08/12/22 0519    hydrALAZINE (APRESOLINE) injection 10-20 mg  10-20 mg Intravenous Q HOUR PRN Jeremy M Gonda, M.D.   10 mg at 08/11/22 1349    labetalol (NORMODYNE/TRANDATE) injection 10-20 mg  10-20 mg Intravenous Q HOUR PRN Jeremy M Gonda, M.D.   20 mg at 08/11/22 1803       Fluids    Intake/Output Summary (Last 24 hours) at 8/12/2022 0659  Last data filed at 8/12/2022 0600  Gross per 24 hour   Intake 2205 ml   Output 2475 ml   Net -270 ml       Laboratory          Recent Labs     08/12/22  0413   SODIUM 137   POTASSIUM 4.1   CHLORIDE 104   CO2 20   BUN 13   CREATININE 1.11   CALCIUM 8.9     Recent Labs     08/12/22  0413   GLUCOSE 149*     Recent Labs     08/12/22  0413   WBC 11.4*     Recent Labs     08/11/22  0545 08/12/22  0413   RBC  --  4.56   HEMOGLOBIN  --  12.9   HEMATOCRIT  --  37.7   PLATELETCT  --  299   PROTHROMBTM 14.6  --    APTT 31.5  --    INR 1.16*  --        Imaging  X-Ray:  I have personally reviewed the images and compared with prior images. and My impression is: No acute cardiopulmonary process    Assessment/Plan  * Pituitary macroadenoma (HCC)- (present on admission)  Assessment & Plan  S/p transsphenoidal resection 8/11 by Dr. Mejia  Continue routine postoperative management  Monitor for CSF leak  As needed analgesics  Goal SBP less than 160  No need for cortisol replacement treatment    Hypertension- (present on admission)  Assessment & Plan  No prescribed outpatient therapy -improving  As needed labetalol and hydralazine for  goal SBP less than 160    Morbid obesity (HCC)- (present on admission)  Assessment & Plan  Encourage behavioral and dietary modification for weight loss    Chronic sinusitis- (present on admission)  Assessment & Plan  S/p ethmoidectomy, septoplasty, inferior turbinate resection 8/11  As needed nasal spray when cleared by ENT  Completed perioperative antibiotics       VTE:  Contraindicated  Ulcer: Not Indicated  Lines: Black Catheter to be removed today    I have performed a physical exam and reviewed and updated ROS and Plan today (8/12/2022). In review of yesterday's note (8/11/2022), there are no changes except as documented above.     Discussed patient condition and risk of morbidity and/or mortality with RN, RT, Pharmacy, Charge nurse / hot rounds, Patient, and neurosurgery.  Critical care will sign off.  Please call with questions.

## 2022-08-13 ENCOUNTER — APPOINTMENT (OUTPATIENT)
Dept: RADIOLOGY | Facility: MEDICAL CENTER | Age: 42
DRG: 614 | End: 2022-08-13
Attending: NURSE PRACTITIONER
Payer: MEDICAID

## 2022-08-13 LAB
ANION GAP SERPL CALC-SCNC: 11 MMOL/L (ref 7–16)
BUN SERPL-MCNC: 16 MG/DL (ref 8–22)
CALCIUM SERPL-MCNC: 8.7 MG/DL (ref 8.5–10.5)
CHLORIDE SERPL-SCNC: 106 MMOL/L (ref 96–112)
CO2 SERPL-SCNC: 23 MMOL/L (ref 20–33)
CORTIS SERPL-MCNC: 11.5 UG/DL (ref 0–23)
CREAT SERPL-MCNC: 1.02 MG/DL (ref 0.5–1.4)
ERYTHROCYTE [DISTWIDTH] IN BLOOD BY AUTOMATED COUNT: 50.4 FL (ref 35.9–50)
GFR SERPLBLD CREATININE-BSD FMLA CKD-EPI: 70 ML/MIN/1.73 M 2
GLUCOSE SERPL-MCNC: 138 MG/DL (ref 65–99)
HCT VFR BLD AUTO: 37.7 % (ref 37–47)
HGB BLD-MCNC: 13.1 G/DL (ref 12–16)
MCH RBC QN AUTO: 28.7 PG (ref 27–33)
MCHC RBC AUTO-ENTMCNC: 34.7 G/DL (ref 33.6–35)
MCV RBC AUTO: 82.5 FL (ref 81.4–97.8)
PLATELET # BLD AUTO: 272 K/UL (ref 164–446)
PMV BLD AUTO: 9.7 FL (ref 9–12.9)
POTASSIUM SERPL-SCNC: 4 MMOL/L (ref 3.6–5.5)
RBC # BLD AUTO: 4.57 M/UL (ref 4.2–5.4)
SODIUM SERPL-SCNC: 140 MMOL/L (ref 135–145)
WBC # BLD AUTO: 8.9 K/UL (ref 4.8–10.8)

## 2022-08-13 PROCEDURE — 700101 HCHG RX REV CODE 250: Performed by: NURSE PRACTITIONER

## 2022-08-13 PROCEDURE — 700102 HCHG RX REV CODE 250 W/ 637 OVERRIDE(OP): Performed by: OTOLARYNGOLOGY

## 2022-08-13 PROCEDURE — A9270 NON-COVERED ITEM OR SERVICE: HCPCS | Performed by: INTERNAL MEDICINE

## 2022-08-13 PROCEDURE — 700102 HCHG RX REV CODE 250 W/ 637 OVERRIDE(OP): Performed by: NURSE PRACTITIONER

## 2022-08-13 PROCEDURE — 82533 TOTAL CORTISOL: CPT

## 2022-08-13 PROCEDURE — 700102 HCHG RX REV CODE 250 W/ 637 OVERRIDE(OP): Performed by: INTERNAL MEDICINE

## 2022-08-13 PROCEDURE — 36415 COLL VENOUS BLD VENIPUNCTURE: CPT

## 2022-08-13 PROCEDURE — A9270 NON-COVERED ITEM OR SERVICE: HCPCS | Performed by: NURSE PRACTITIONER

## 2022-08-13 PROCEDURE — 70450 CT HEAD/BRAIN W/O DYE: CPT

## 2022-08-13 PROCEDURE — 85027 COMPLETE CBC AUTOMATED: CPT

## 2022-08-13 PROCEDURE — 80048 BASIC METABOLIC PNL TOTAL CA: CPT

## 2022-08-13 PROCEDURE — A9270 NON-COVERED ITEM OR SERVICE: HCPCS | Performed by: OTOLARYNGOLOGY

## 2022-08-13 PROCEDURE — 770001 HCHG ROOM/CARE - MED/SURG/GYN PRIV*

## 2022-08-13 RX ORDER — ECHINACEA PURPUREA EXTRACT 125 MG
2 TABLET ORAL 4 TIMES DAILY
Status: DISCONTINUED | OUTPATIENT
Start: 2022-08-13 | End: 2022-08-16 | Stop reason: HOSPADM

## 2022-08-13 RX ADMIN — DOCUSATE SODIUM 100 MG: 100 CAPSULE, LIQUID FILLED ORAL at 17:55

## 2022-08-13 RX ADMIN — SENNOSIDES AND DOCUSATE SODIUM 2 TABLET: 50; 8.6 TABLET ORAL at 17:56

## 2022-08-13 RX ADMIN — LEVOTHYROXINE SODIUM 50 MCG: 0.05 TABLET ORAL at 05:52

## 2022-08-13 RX ADMIN — SENNOSIDES AND DOCUSATE SODIUM 2 TABLET: 50; 8.6 TABLET ORAL at 05:52

## 2022-08-13 RX ADMIN — OXYCODONE HYDROCHLORIDE 10 MG: 10 TABLET ORAL at 08:43

## 2022-08-13 RX ADMIN — ACETAMINOPHEN 1000 MG: 500 TABLET ORAL at 21:18

## 2022-08-13 RX ADMIN — OXYCODONE 5 MG: 5 TABLET ORAL at 22:54

## 2022-08-13 RX ADMIN — SODIUM PHOSPHATE 133 ML: 7; 19 ENEMA RECTAL at 17:00

## 2022-08-13 RX ADMIN — OXYCODONE HYDROCHLORIDE 10 MG: 10 TABLET ORAL at 19:39

## 2022-08-13 RX ADMIN — MAGNESIUM HYDROXIDE 30 ML: 400 SUSPENSION ORAL at 17:56

## 2022-08-13 RX ADMIN — SALINE NASAL SPRAY 2 SPRAY: 1.5 SOLUTION NASAL at 14:19

## 2022-08-13 RX ADMIN — ACETAMINOPHEN 1000 MG: 500 TABLET ORAL at 05:52

## 2022-08-13 RX ADMIN — DOCUSATE SODIUM 100 MG: 100 CAPSULE, LIQUID FILLED ORAL at 05:52

## 2022-08-13 ASSESSMENT — PAIN DESCRIPTION - PAIN TYPE
TYPE: SURGICAL PAIN;ACUTE PAIN

## 2022-08-13 NOTE — CARE PLAN
The patient is Stable - Low risk of patient condition declining or worsening    Shift Goals  Clinical Goals: pain control; monitor neuro status  Patient Goals: pain control  Family Goals: MARY LOU    Progress made toward(s) clinical / shift goals:  Educated patient on pain rating scales, frequent pain assessments in place, medicating per MAR, non pharmaceutical pain relief such as heat pads and positioning in use.        Patient is not progressing towards the following goals:

## 2022-08-13 NOTE — CARE PLAN
The patient is Stable - Low risk of patient condition declining or worsening    Shift Goals  Clinical Goals: monitor neuro status  Patient Goals: pain control  Family Goals: MARY LOU    Progress made toward(s) clinical / shift goals:    Problem: Pain - Standard  Goal: Alleviation of pain or a reduction in pain to the patient’s comfort goal  Outcome: Progressing     Problem: Hemodynamics  Goal: Patient's hemodynamics, fluid balance and neurologic status will be stable or improve  Outcome: Progressing     Problem: Hemodynamics  Goal: Patient's hemodynamics, fluid balance and neurologic status will be stable or improve  Outcome: Progressing       Patient is not progressing towards the following goals:      Problem: Psychosocial  Goal: Patient's level of anxiety will decrease  Outcome: Not Progressing

## 2022-08-13 NOTE — PROGRESS NOTES
"S: Doing well overall. More pain and pressure today. Feels that vision is a little worse.     O:  /88   Pulse 75   Temp 36.6 °C (97.9 °F) (Temporal)   Resp 18   Ht 1.753 m (5' 9\")   Wt (!) 182 kg (401 lb 0.3 oz)   SpO2 96%   Gen: interactive and appropriate  Pulm: Breathing comfortably on RA without stridor or stertor  Face: symmetric, no edema, facial strength intact bilaterally  Eyes: conjunctiva clear, no chemosis. EOMI  Ears: pinna normal. Hearing grossly intact  Nose: patent, some minimal bloody drainage.   OC/OP: clear, no masses.   Neck: flat, no discrete masses  Neuro: cranial nerves grossly intact bilaterally. 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 Doing well overall, but has more pressure and discomfort today. No e/o CSF leak. Saline spray ordered, which should help some with congestion. Given concern for worsening vision, discussed possible repeat CT with Dr. Mejia. He will evaluate the patient and order CT if appropriate.    Aggressive stool softeners and possible enema would be helpful to avoid straining    - skull base precautions: no nose blowing, sneeze with mouth open, no nasal manipulation, no positive pressure ventilation, avoid strenuous activity or heavy lifting for 4-6 weeks      Mary Pena M.D.    "

## 2022-08-13 NOTE — PROGRESS NOTES
Neurosurgery Progress Note    Subjective:  Feeling a lot of pressure behind her nose/eyes and HA  +nasal congestion  Slight blurry vision    Exam:  A/Ox4  ARDON no focal deficits  Small bloody discharge from the nose, but no signs of CSF    BP  Min: 117/57  Max: 137/98  Pulse  Av.3  Min: 60  Max: 94  Resp  Av.1  Min: 17  Max: 32  Temp  Av.4 °C (97.6 °F)  Min: 36.2 °C (97.2 °F)  Max: 36.6 °C (97.9 °F)  Monitored Temp 2  Av.6 °C (97.9 °F)  Min: 36.2 °C (97.2 °F)  Max: 36.8 °C (98.2 °F)  SpO2  Av.9 %  Min: 91 %  Max: 97 %    No data recorded    Recent Labs     22  0337   WBC 11.4* 8.9   RBC 4.56 4.57   HEMOGLOBIN 12.9 13.1   HEMATOCRIT 37.7 37.7   MCV 82.7 82.5   MCH 28.3 28.7   MCHC 34.2 34.7   RDW 50.3* 50.4*   PLATELETCT 299 272   MPV 10.7 9.7       Recent Labs     223 22  0337   SODIUM 137 140   POTASSIUM 4.1 4.0   CHLORIDE 104 106   CO2 20 23   GLUCOSE 149* 138*   BUN 13 16   CREATININE 1.11 1.02   CALCIUM 8.9 8.7       Recent Labs     22  0545   APTT 31.5   INR 1.16*             Intake/Output                         22 07 - 22 0659 22 07 - 22 0659      Total 1900-0659 Total                 Intake    P.O.  500  -- 500  --  -- --    P.O. 500 -- 500 -- -- --    Total Intake 500 -- 500 -- -- --       Output    Urine  1500  -- 1500  --  -- --    Number of Times Voided -- 3 x 3 x -- -- --    Urine Void (mL) 550 -- 550 -- -- --    Output (mL) ([REMOVED] Urethral Catheter Non-latex;Temperature probe 16 Fr.) 950 -- 950 -- -- --    Total Output 1500 -- 1500 -- -- --       Net I/O     -1000 -- -1000 -- -- --              Intake/Output Summary (Last 24 hours) at 2022 1045  Last data filed at 2022 1600  Gross per 24 hour   Intake 500 ml   Output 800 ml   Net -300 ml               beclomethasone HFA  2 Puff BID    sodium chloride  2 Spray 4X/DAY    levothyroxine  50 mcg AM ES    Pharmacy Consult  Request  1 Each PHARMACY TO DOSE    MD ALERT...DO NOT ADMINISTER NSAIDS or ASPIRIN unless ORDERED By Neurosurgery  1 Each PRN    ondansetron  4 mg Q4HRS PRN    dexamethasone  4 mg Once PRN    diphenhydrAMINE  25 mg Q6HRS PRN    scopolamine  1 Patch Q72HRS PRN    docusate sodium  100 mg BID    senna-docusate  1 Tablet Q24HRS PRN    bisacodyl  10 mg Q24HRS PRN    sodium phosphate  1 Each Once PRN    acetaminophen  1,000 mg Q8HRS    oxyCODONE immediate-release  5 mg Q3HRS PRN    Or    oxyCODONE immediate-release  10 mg Q3HRS PRN    Or    HYDROmorphone  0.5 mg Q3HRS PRN    acetaminophen  650 mg Q6HRS PRN    senna-docusate  2 Tablet BID    And    polyethylene glycol/lytes  1 Packet QDAY PRN    And    magnesium hydroxide  30 mL QDAY PRN    And    bisacodyl  10 mg QDAY PRN    hydrALAZINE  10-20 mg Q HOUR PRN    labetalol  10-20 mg Q HOUR PRN       Assessment and Plan:  Hospital day # 2  POD# TSA for pituitary macroadenoma  Chemical prophylactic DVT therapy: No  Start date/time: tbd    VSS  Urine output adequate  Na 140  +HA/pressure worse this AM: will order STAT head CT to evaluate  No CSF leak   Nasal congestion, ordered saline flush by Dr. Pena  Cortisol this AM 11.5  PT/OT eval

## 2022-08-14 LAB
BACTERIA SPEC ANAEROBE CULT: NORMAL
CORTIS SERPL-MCNC: 14.7 UG/DL (ref 0–23)
SIGNIFICANT IND 70042: NORMAL
SITE SITE: NORMAL
SOURCE SOURCE: NORMAL

## 2022-08-14 PROCEDURE — 36415 COLL VENOUS BLD VENIPUNCTURE: CPT

## 2022-08-14 PROCEDURE — 700111 HCHG RX REV CODE 636 W/ 250 OVERRIDE (IP): Performed by: NURSE PRACTITIONER

## 2022-08-14 PROCEDURE — 700102 HCHG RX REV CODE 250 W/ 637 OVERRIDE(OP): Performed by: NURSE PRACTITIONER

## 2022-08-14 PROCEDURE — A9270 NON-COVERED ITEM OR SERVICE: HCPCS | Performed by: NURSE PRACTITIONER

## 2022-08-14 PROCEDURE — A9270 NON-COVERED ITEM OR SERVICE: HCPCS | Performed by: INTERNAL MEDICINE

## 2022-08-14 PROCEDURE — 770001 HCHG ROOM/CARE - MED/SURG/GYN PRIV*

## 2022-08-14 PROCEDURE — 82533 TOTAL CORTISOL: CPT

## 2022-08-14 PROCEDURE — 700102 HCHG RX REV CODE 250 W/ 637 OVERRIDE(OP): Performed by: INTERNAL MEDICINE

## 2022-08-14 RX ORDER — OXYCODONE HYDROCHLORIDE AND ACETAMINOPHEN 5; 325 MG/1; MG/1
1 TABLET ORAL EVERY 4 HOURS PRN
Qty: 18 TABLET | Refills: 0
Start: 2022-08-14 | End: 2022-08-17

## 2022-08-14 RX ADMIN — OXYCODONE 5 MG: 5 TABLET ORAL at 17:40

## 2022-08-14 RX ADMIN — ACETAMINOPHEN 1000 MG: 500 TABLET ORAL at 14:08

## 2022-08-14 RX ADMIN — ACETAMINOPHEN 1000 MG: 500 TABLET ORAL at 04:54

## 2022-08-14 RX ADMIN — SALINE NASAL SPRAY 2 SPRAY: 1.5 SOLUTION NASAL at 14:09

## 2022-08-14 RX ADMIN — OXYCODONE HYDROCHLORIDE 10 MG: 10 TABLET ORAL at 08:23

## 2022-08-14 RX ADMIN — OXYCODONE 5 MG: 5 TABLET ORAL at 20:48

## 2022-08-14 RX ADMIN — DOCUSATE SODIUM 100 MG: 100 CAPSULE, LIQUID FILLED ORAL at 04:55

## 2022-08-14 RX ADMIN — ONDANSETRON 4 MG: 2 INJECTION INTRAMUSCULAR; INTRAVENOUS at 08:28

## 2022-08-14 RX ADMIN — LEVOTHYROXINE SODIUM 50 MCG: 0.05 TABLET ORAL at 04:54

## 2022-08-14 RX ADMIN — SALINE NASAL SPRAY 2 SPRAY: 1.5 SOLUTION NASAL at 17:41

## 2022-08-14 RX ADMIN — ACETAMINOPHEN 1000 MG: 500 TABLET ORAL at 23:18

## 2022-08-14 RX ADMIN — SALINE NASAL SPRAY 2 SPRAY: 1.5 SOLUTION NASAL at 10:04

## 2022-08-14 RX ADMIN — SENNOSIDES AND DOCUSATE SODIUM 2 TABLET: 50; 8.6 TABLET ORAL at 04:54

## 2022-08-14 RX ADMIN — OXYCODONE HYDROCHLORIDE 10 MG: 10 TABLET ORAL at 04:54

## 2022-08-14 RX ADMIN — DOCUSATE SODIUM 100 MG: 100 CAPSULE, LIQUID FILLED ORAL at 17:40

## 2022-08-14 RX ADMIN — SENNOSIDES AND DOCUSATE SODIUM 2 TABLET: 50; 8.6 TABLET ORAL at 17:40

## 2022-08-14 RX ADMIN — OXYCODONE 5 MG: 5 TABLET ORAL at 14:09

## 2022-08-14 ASSESSMENT — PAIN DESCRIPTION - PAIN TYPE: TYPE: ACUTE PAIN;SURGICAL PAIN

## 2022-08-14 ASSESSMENT — PATIENT HEALTH QUESTIONNAIRE - PHQ9
2. FEELING DOWN, DEPRESSED, IRRITABLE, OR HOPELESS: NOT AT ALL
SUM OF ALL RESPONSES TO PHQ QUESTIONS 1-9: 0
SUM OF ALL RESPONSES TO PHQ9 QUESTIONS 1 AND 2: 0
4. FEELING TIRED OR HAVING LITTLE ENERGY: NOT AT ALL
6. FEELING BAD ABOUT YOURSELF - OR THAT YOU ARE A FAILURE OR HAVE LET YOURSELF OR YOUR FAMILY DOWN: NOT AL ALL
7. TROUBLE CONCENTRATING ON THINGS, SUCH AS READING THE NEWSPAPER OR WATCHING TELEVISION: NOT AT ALL
3. TROUBLE FALLING OR STAYING ASLEEP OR SLEEPING TOO MUCH: NOT AT ALL
1. LITTLE INTEREST OR PLEASURE IN DOING THINGS: NOT AT ALL
9. THOUGHTS THAT YOU WOULD BE BETTER OFF DEAD, OR OF HURTING YOURSELF: NOT AT ALL
5. POOR APPETITE OR OVEREATING: NOT AT ALL
8. MOVING OR SPEAKING SO SLOWLY THAT OTHER PEOPLE COULD HAVE NOTICED. OR THE OPPOSITE, BEING SO FIGETY OR RESTLESS THAT YOU HAVE BEEN MOVING AROUND A LOT MORE THAN USUAL: NOT AT ALL

## 2022-08-14 NOTE — THERAPY
Missed Therapy     Patient Name: Ernesto Ramos  Age:  42 y.o., Sex:  female  Medical Record #: [id number]  Today's Date: 8/14/2022    Discussed missed therapy with    08/14/22 0920   Initial Contact Note    Initial Contact Note Order Received and Verified. Physical Therapy Evaluation NOT Completed Because Patient Does Not Require Acute Physical Therapy at this Time.   Interdisciplinary Plan of Care Collaboration   IDT Collaboration with  Nursing   Collaboration Comments PT re-eval order received, chart reviewed.  Pt was previously evaluated on 8/12 and was deemed that pt will have no further needs for PT upon DC. Spoke with RN, per RN no change in physical or medical status on patient,it is a duplicate order. Will DC PT re-eval order. please refer to previous PT eval note for details on patient.   Session Information   Date / Session Number  8/14- DC PT eval ( duplicate order)       Tianna Severino, PT,DPT

## 2022-08-14 NOTE — PROGRESS NOTES
Neurosurgery Progress Note    Subjective:  Feeling a lot of pressure behind her nose/eyes and HA  +nasal congestion  Slight blurry vision    Exam:  A/Ox4  ARDON no focal deficits  Small bloody discharge from the nose, but no signs of CSF    BP  Min: 113/84  Max: 138/92  Pulse  Av.3  Min: 76  Max: 85  Resp  Av  Min: 18  Max: 18  Temp  Av.4 °C (97.6 °F)  Min: 36.2 °C (97.2 °F)  Max: 36.6 °C (97.9 °F)  Monitored Temp 2  Av.6 °C (97.9 °F)  Min: 36.6 °C (97.9 °F)  Max: 36.6 °C (97.9 °F)  SpO2  Av.4 %  Min: 93 %  Max: 98 %    No data recorded    Recent Labs     22  0337   WBC 11.4* 8.9   RBC 4.56 4.57   HEMOGLOBIN 12.9 13.1   HEMATOCRIT 37.7 37.7   MCV 82.7 82.5   MCH 28.3 28.7   MCHC 34.2 34.7   RDW 50.3* 50.4*   PLATELETCT 299 272   MPV 10.7 9.7       Recent Labs     22  0337   SODIUM 137 140   POTASSIUM 4.1 4.0   CHLORIDE 104 106   CO2 20 23   GLUCOSE 149* 138*   BUN 13 16   CREATININE 1.11 1.02   CALCIUM 8.9 8.7                   Intake/Output                         22 - 22 0659 22 - 08/15/22 0659      Total  Total                 Intake    Total Intake -- -- -- -- -- --       Output    Urine  --  -- --  --  -- --    Number of Times Voided -- 1 x 1 x -- -- --    Stool  --  -- --  --  -- --    Number of Times Stooled 1 x -- 1 x -- -- --    Total Output -- -- -- -- -- --       Net I/O     -- -- -- -- -- --            No intake or output data in the 24 hours ending 22 1032            beclomethasone HFA  2 Puff BID    sodium chloride  2 Spray 4X/DAY    levothyroxine  50 mcg AM ES    Pharmacy Consult Request  1 Each PHARMACY TO DOSE    MD ALERT...DO NOT ADMINISTER NSAIDS or ASPIRIN unless ORDERED By Neurosurgery  1 Each PRN    ondansetron  4 mg Q4HRS PRN    dexamethasone  4 mg Once PRN    diphenhydrAMINE  25 mg Q6HRS PRN    scopolamine  1 Patch Q72HRS PRN    docusate sodium  100 mg BID     senna-docusate  1 Tablet Q24HRS PRN    bisacodyl  10 mg Q24HRS PRN    acetaminophen  1,000 mg Q8HRS    oxyCODONE immediate-release  5 mg Q3HRS PRN    Or    oxyCODONE immediate-release  10 mg Q3HRS PRN    Or    HYDROmorphone  0.5 mg Q3HRS PRN    acetaminophen  650 mg Q6HRS PRN    senna-docusate  2 Tablet BID    And    polyethylene glycol/lytes  1 Packet QDAY PRN    And    magnesium hydroxide  30 mL QDAY PRN    And    bisacodyl  10 mg QDAY PRN    hydrALAZINE  10-20 mg Q HOUR PRN    labetalol  10-20 mg Q HOUR PRN       Assessment and Plan:  Hospital day # 3  POD# 3TSA for pituitary macroadenoma  Chemical prophylactic DVT therapy: No  Start date/time: tbd    VSS  Urine output adequate  Cortisol stable  CT 8/14 looking good  No CSF leak   Nasal congestion, ordered saline flush by Dr. Pena    Discussed with the patient, she is still feeling a lot of pressure in her head and its affecting her ADLs and afraid to go home.  Also reports feeling unsteady on her feet. She lives by herself.  We will prep her to go home tmrw.  Discussed with RN, PT erick

## 2022-08-14 NOTE — DISCHARGE INSTRUCTIONS
skull base precautions: no nose blowing, sneeze with mouth open, no nasal manipulation, no positive pressure ventilation, avoid strenuous activity or heavy lifting for 4-6 weeks   Leave incision open to air.    Avoid repetitive bending, lifting over 10lbs, twisting. We encourage you to take frequent walks as tolerated  Do not drive or consume alcohol while taking narcotic pain medications. Do not take additional acetaminophen (tylenol) without consulting our office.   Your prescription has been sent to your pharmacy [facility]  Do not take NSAIDs (such as ibuprofen or naproxen) or Aspirin unless you have been told otherwise by Dr Mejia's team  Follow up at [facility] Neurosurgery office in 2 weeks. Call with questions or concerns @ [phone] Discharge Instructions    Discharged to home by car with relative. Discharged via wheelchair, hospital escort: Yes.  Special equipment needed: Not Applicable    Be sure to schedule a follow-up appointment with your primary care doctor or any specialists as instructed.     Discharge Plan:   Diet Plan: Discussed  Activity Level: Discussed  Confirmed Follow up Appointment: Patient to Call and Schedule Appointment  Confirmed Symptoms Management: Discussed  Medication Reconciliation Updated: Yes    I understand that a diet low in cholesterol, fat, and sodium is recommended for good health. Unless I have been given specific instructions below for another diet, I accept this instruction as my diet prescription.   Other diet: Regular    Special Instructions: None    -Is this patient being discharged with medication to prevent blood clots?  No    Is patient discharged on Warfarin / Coumadin?   No

## 2022-08-14 NOTE — CARE PLAN
The patient is Stable - Low risk of patient condition declining or worsening    Shift Goals  Clinical Goals: pain management  Patient Goals: pain management  Family Goals: MARY LOU    Progress made toward(s) clinical / shift goals:    Problem: Pain - Standard  Goal: Alleviation of pain or a reduction in pain to the patient’s comfort goal  Outcome: Progressing     Problem: Hemodynamics  Goal: Patient's hemodynamics, fluid balance and neurologic status will be stable or improve  Outcome: Progressing     Problem: Urinary Elimination  Goal: Establish and maintain regular urinary output  Outcome: Progressing     Problem: Bowel Elimination  Goal: Establish and maintain regular bowel function  Outcome: Progressing     Problem: Skin Integrity  Goal: Skin integrity is maintained or improved  Outcome: Progressing       Patient is not progressing towards the following goals:

## 2022-08-15 LAB
BACTERIA WND AEROBE CULT: ABNORMAL
CORTIS SERPL-MCNC: 8.6 UG/DL (ref 0–23)
GRAM STN SPEC: ABNORMAL
SIGNIFICANT IND 70042: ABNORMAL
SITE SITE: ABNORMAL
SOURCE SOURCE: ABNORMAL

## 2022-08-15 PROCEDURE — 700102 HCHG RX REV CODE 250 W/ 637 OVERRIDE(OP): Performed by: INTERNAL MEDICINE

## 2022-08-15 PROCEDURE — 770001 HCHG ROOM/CARE - MED/SURG/GYN PRIV*

## 2022-08-15 PROCEDURE — 94640 AIRWAY INHALATION TREATMENT: CPT

## 2022-08-15 PROCEDURE — 82533 TOTAL CORTISOL: CPT

## 2022-08-15 PROCEDURE — 97535 SELF CARE MNGMENT TRAINING: CPT

## 2022-08-15 PROCEDURE — A9270 NON-COVERED ITEM OR SERVICE: HCPCS | Performed by: INTERNAL MEDICINE

## 2022-08-15 PROCEDURE — 97164 PT RE-EVAL EST PLAN CARE: CPT

## 2022-08-15 PROCEDURE — 700102 HCHG RX REV CODE 250 W/ 637 OVERRIDE(OP): Performed by: NURSE PRACTITIONER

## 2022-08-15 PROCEDURE — 94760 N-INVAS EAR/PLS OXIMETRY 1: CPT

## 2022-08-15 PROCEDURE — A9270 NON-COVERED ITEM OR SERVICE: HCPCS | Performed by: NURSE PRACTITIONER

## 2022-08-15 RX ADMIN — SALINE NASAL SPRAY 2 SPRAY: 1.5 SOLUTION NASAL at 21:34

## 2022-08-15 RX ADMIN — MAGNESIUM HYDROXIDE 30 ML: 400 SUSPENSION ORAL at 17:59

## 2022-08-15 RX ADMIN — LEVOTHYROXINE SODIUM 50 MCG: 0.05 TABLET ORAL at 04:29

## 2022-08-15 RX ADMIN — POLYETHYLENE GLYCOL 3350 1 PACKET: 17 POWDER, FOR SOLUTION ORAL at 18:00

## 2022-08-15 RX ADMIN — SALINE NASAL SPRAY 2 SPRAY: 1.5 SOLUTION NASAL at 14:34

## 2022-08-15 RX ADMIN — ACETAMINOPHEN 1000 MG: 500 TABLET ORAL at 14:34

## 2022-08-15 RX ADMIN — OXYCODONE 5 MG: 5 TABLET ORAL at 18:03

## 2022-08-15 RX ADMIN — OXYCODONE 5 MG: 5 TABLET ORAL at 21:22

## 2022-08-15 RX ADMIN — SENNOSIDES AND DOCUSATE SODIUM 2 TABLET: 50; 8.6 TABLET ORAL at 17:59

## 2022-08-15 RX ADMIN — ACETAMINOPHEN 1000 MG: 500 TABLET ORAL at 04:28

## 2022-08-15 RX ADMIN — DOCUSATE SODIUM 100 MG: 100 CAPSULE, LIQUID FILLED ORAL at 04:28

## 2022-08-15 RX ADMIN — MAGNESIUM HYDROXIDE 30 ML: 400 SUSPENSION ORAL at 04:27

## 2022-08-15 RX ADMIN — SENNOSIDES AND DOCUSATE SODIUM 2 TABLET: 50; 8.6 TABLET ORAL at 04:28

## 2022-08-15 RX ADMIN — DOCUSATE SODIUM 100 MG: 100 CAPSULE, LIQUID FILLED ORAL at 17:59

## 2022-08-15 RX ADMIN — SALINE NASAL SPRAY 2 SPRAY: 1.5 SOLUTION NASAL at 09:20

## 2022-08-15 RX ADMIN — ACETAMINOPHEN 1000 MG: 500 TABLET ORAL at 21:23

## 2022-08-15 ASSESSMENT — COGNITIVE AND FUNCTIONAL STATUS - GENERAL
CLIMB 3 TO 5 STEPS WITH RAILING: A LITTLE
WALKING IN HOSPITAL ROOM: A LITTLE
MOBILITY SCORE: 22
SUGGESTED CMS G CODE MODIFIER MOBILITY: CJ

## 2022-08-15 ASSESSMENT — GAIT ASSESSMENTS
GAIT LEVEL OF ASSIST: CONTACT GUARD ASSIST
DISTANCE (FEET): 40
DEVIATION: INCREASED BASE OF SUPPORT;OTHER (COMMENT)

## 2022-08-15 NOTE — CARE PLAN
The patient is   Shift Goals  Clinical Goals: Pain control  Patient Goals: Rest  Family Goals: MARY LOU    Problem: Pain - Standard  Goal: Alleviation of pain or a reduction in pain to the patient’s comfort goal  Outcome: Progressing  Note: Pain meds given per MAR. Oxy 5mg given once during NOC shift. Room temperature was adjusted to patient's liking.      Problem: Psychosocial  Goal: Patient's level of anxiety will decrease  Outcome: Progressing  Flowsheets (Taken 8/15/2022 7716)  Patient Behaviors: Anxious  Note: Patient appeared anxious this AM, RN asked if she needed pain meds but patient refused and only said she was looking forward to talking with doctor today.      Progress made toward(s) clinical / shift goals:  see notes for details    Patient is not progressing towards the following goals:

## 2022-08-15 NOTE — DISCHARGE PLANNING
Case Management Discharge Planning    Admission Date: 8/11/2022  GMLOS: 1.8  ALOS: 4    6-Clicks ADL Score: 22  6-Clicks Mobility Score: 22      Anticipated Discharge Dispo: Discharge Disposition: Disch to IP rehab facility or distinct part unit (62) PT/OT has cleared pt , no recommendation    DME Needed: no    Action(s) Taken: discussed with IDR team, per RN, pt is not medically clear-constipated and nose is still bleeding.     Escalations Completed: None    Medically Clear: No    Next Steps: discuss with IDR team    Barriers to Discharge: medical clearance    Is the patient up for discharge tomorrow: yes

## 2022-08-15 NOTE — PROGRESS NOTES
"Neurosurgery Progress Note    Subjective:  Feeling a lot of pressure behind her nose/eyes and HA  +nasal discharge this am, bloody  Slight blurry vision  \"I just don't think I'm ready to go home today\"    Exam:  A/Ox4  ARDON no focal deficits  Mild to moderate amount of bloody discharge from the nose evident on gauze pads at bedside, but no signs of CSF    BP  Min: 135/91  Max: 165/99  Pulse  Av.4  Min: 72  Max: 99  Resp  Av.8  Min: 17  Max: 18  Temp  Av.6 °C (97.8 °F)  Min: 36.5 °C (97.7 °F)  Max: 36.7 °C (98.1 °F)  Monitored Temp 2  Av.4 °C (97.5 °F)  Min: 36.4 °C (97.5 °F)  Max: 36.4 °C (97.5 °F)  SpO2  Av %  Min: 90 %  Max: 96 %    No data recorded    Recent Labs     22  0337   WBC 8.9   RBC 4.57   HEMOGLOBIN 13.1   HEMATOCRIT 37.7   MCV 82.5   MCH 28.7   MCHC 34.7   RDW 50.4*   PLATELETCT 272   MPV 9.7       Recent Labs     22  0337   SODIUM 140   POTASSIUM 4.0   CHLORIDE 106   CO2 23   GLUCOSE 138*   BUN 16   CREATININE 1.02   CALCIUM 8.7                   Intake/Output                         22 0700 - 08/15/22 0659 08/15/22 07 - 22 0659     1900-0659 Total 1900-0659 Total                 Intake    P.O.  600  240 840  --  -- --    P.O. 600 240 840 -- -- --    Total Intake 600 240 840 -- -- --       Output    Urine  --  -- --  --  -- --    Number of Times Voided -- 4 x 4 x -- -- --    Total Output -- -- -- -- -- --       Net I/O     600 240 840 -- -- --              Intake/Output Summary (Last 24 hours) at 8/15/2022 1103  Last data filed at 2022 1900  Gross per 24 hour   Intake 640 ml   Output --   Net 640 ml               beclomethasone HFA  2 Puff BID    sodium chloride  2 Spray 4X/DAY    levothyroxine  50 mcg AM ES    Pharmacy Consult Request  1 Each PHARMACY TO DOSE    MD ALERT...DO NOT ADMINISTER NSAIDS or ASPIRIN unless ORDERED By Neurosurgery  1 Each PRN    ondansetron  4 mg Q4HRS PRN    dexamethasone  4 mg Once PRN    " diphenhydrAMINE  25 mg Q6HRS PRN    scopolamine  1 Patch Q72HRS PRN    docusate sodium  100 mg BID    bisacodyl  10 mg Q24HRS PRN    acetaminophen  1,000 mg Q8HRS    oxyCODONE immediate-release  5 mg Q3HRS PRN    Or    HYDROmorphone  0.5 mg Q3HRS PRN    acetaminophen  650 mg Q6HRS PRN    senna-docusate  2 Tablet BID    And    polyethylene glycol/lytes  1 Packet QDAY PRN    And    magnesium hydroxide  30 mL QDAY PRN    And    bisacodyl  10 mg QDAY PRN    hydrALAZINE  10-20 mg Q HOUR PRN    labetalol  10-20 mg Q HOUR PRN       Assessment and Plan:  Hospital day # 4  POD# 4TSA for pituitary macroadenoma  Chemical prophylactic DVT therapy: No  Start date/time: tbd    VSS  Urine output adequate  Cortisol 8.6 today, continue to monitor  CT 8/14 looking good  No CSF leak   Nasal congestion, ordered saline flush by Dr. Pena    PT re-eval declined     Discharge dispo discussed with Dr Mejia, will recommend that pt discharge home today as she is not meeting admission criteria  F/up at [facility]

## 2022-08-15 NOTE — DISCHARGE SUMMARY
Discharge Summary    CHIEF COMPLAINT ON ADMISSION  No chief complaint on file.      Reason for Admission  Benign neoplasm of pituitary gland     Admission Date  8/11/2022    CODE STATUS  Full Code    HPI & HOSPITAL COURSE  The patient is a 42-year-old female who was admitted to the hospital on 8/11/2022 and underwent transsphenoidal pituitary resection with Dr. Mejia and Dr. Bowles.  She tolerated procedure very well.  Her cortisol level has been stable throughout the hospital stay.  She did have some increased pressure-like feeling in the back of her eyes which led to a head CT which was stable with postoperative changes.  Her symptoms gradually is improving with saline flushes.  She does not have any signs of CSF leakage out of her nose or down the back of her throat.  She had initially had some difficulty with mobilization due to increased pressure in her head.  She will discharge home if her symptoms improved and cleared by PT/OT.    Therefore, she is discharged in good and stable condition to home with close outpatient follow-up.    The patient met 2-midnight criteria for an inpatient stay at the time of discharge.    Discharge Date  8/15/2022 if meeting criteria    FOLLOW UP ITEMS POST DISCHARGE  With SNG in 2 weeks    DISCHARGE DIAGNOSES  Principal Problem:    Pituitary macroadenoma (HCC) POA: Yes  Active Problems:    Chronic sinusitis POA: Yes      Overview: Last Assessment & Plan:       Formatting of this note might be different from the original.      Currently uncontrolled however is utilizing unknown medications or       intranasal spray      -Intranasal fluticasone sent to pharmacy      -Continue over-the-counter oral antihistamine      -Discussed to continue above regimen for the next 1 to 2 weeks and if       refractory could consider ENT referral    Morbid obesity (HCC) POA: Yes      Overview: Formatting of this note might be different from the original.      Formatting of this note might be  different from the original.      NextGen Description: Morbid obesity        Overview Note:         NG_ID: [id number]      Formatting of this note might be different from the original.      [facility] Description: Morbid obesity        Overview Note:         NG_ID: [id number]    Hypertension POA: Yes  Resolved Problems:    * No resolved hospital problems. *      FOLLOW UP  No future appointments.  No follow-up provider specified.    MEDICATIONS ON DISCHARGE     Medication List        START taking these medications        Instructions   oxyCODONE-acetaminophen 5-325 MG Tabs  Commonly known as: PERCOCET   Take 1 Tablet by mouth every four hours as needed for Moderate Pain for up to 3 days.  Dose: 1 Tablet            CONTINUE taking these medications        Instructions   levothyroxine 50 MCG Tabs  Commonly known as: SYNTHROID   Take 1 Tablet by mouth every morning on an empty stomach.  Dose: 50 mcg     Qvar RediHaler 80 MCG/ACT inhaler  Generic drug: beclomethasone HFA   Inhale 2 Puffs 2 times a day.  Dose: 2 Puff              Allergies  Allergies   Allergen Reactions    Bloodless Unspecified     Pt requesting bloodless protocol    [place] Oil Hives and Rash     Other reaction(s): Rash, urticarial      Cat Hair Extract Itching    Kiwi Extract      Other reaction(s): Lip Swellen  Other reaction(s): Lip Swellen         DIET  Orders Placed This Encounter   Procedures    Diet Order Diet: Regular     Standing Status:   Standing     Number of Occurrences:   1     Order Specific Question:   Diet:     Answer:   Regular [1]       ACTIVITY  As tolerated.  10-lb lifting restriction    CONSULTATIONS  NA    PROCEDURES  TSA for pituitary tumor removal    LABORATORY  Lab Results   Component Value Date    SODIUM 140 08/13/2022    POTASSIUM 4.0 08/13/2022    CHLORIDE 106 08/13/2022    CO2 23 08/13/2022    GLUCOSE 138 (H) 08/13/2022    BUN 16 08/13/2022    CREATININE 1.02 08/13/2022         Lab Results   Component Value Date    WBC 8.9 08/13/2022    HEMOGLOBIN 13.1 08/13/2022    HEMATOCRIT 37.7 08/13/2022    PLATELETCT 272 08/13/2022        Total time of the discharge process exceeds 30 minutes.

## 2022-08-15 NOTE — THERAPY
Physical Therapy   Re-evaluation     Patient Name: Ernesto Ramos  Age:  42 y.o., Sex:  female  Medical Record #: [id number]  Today's Date: 8/15/2022     Precautions  Precautions: Fall Risk;Other (See Comments) (CSF/skull based precautions)    Assessment  Additional PT consult placed due to reported dizziness/pt's concerns returning home. Pt with no overt functional mobility/musculoskeletal deficits at this time; however activity tolerance is significantly limited by congestion/head pressure/dizziness (feels like head is going to explode, /99 post activity). Pt also limited by abdominal pain (reports not having a BM since Friday). Educated on management of constipation and tolieting hygiene/positioning. At this time, recommend medical optimization of symptoms. Will follow at a low frequency to ensure activity tolerance improves after medical optimization.     Plan    Recommend Physical Therapy 3 times per week until therapy goals are met for the following treatments:  Gait Training and Stair Training    DC Equipment Recommendations: None  Discharge Recommendations: Anticipate that the patient will have no further physical therapy needs after discharge from the hospital (Pending progress)       Subjective  Pt states she was able to walk easier in the ICU because she was not as congested. Pt reported significantly increased congestion in the last few days causing increased head pressure and dizziness. Pt reported mod-severe dizziness and feeling like head was going to explode when walking. Pt also reported abdominal pain due to not having a bowel movement since Friday.      Objective       08/15/22 0952   Initial Contact Note    Initial Contact Note Order Received and Verified, Physical Therapy Evaluation in Progress with Full Report to Follow.   Precautions   Precautions Fall Risk;Other (See Comments)  (CSF/skull based precautions)   Vitals   Pulse 99   Blood Pressure (!) 165/99   Pulse Oximetry 95 %   O2  "Delivery Device None - Room Air   Pain 0 - 10 Group   Location Head   Pain Rating Scale (NPRS) 8   Balance Assessment   Sitting Balance (Static) Fair +   Standing Balance (Static) Fair   Gait Analysis   Gait Level Of Assist Contact Guard Assist   Assistive Device None   Distance (Feet) 40   # of Times Distance was Traveled 0   Deviation Increased Base Of Support;Other (Comment)  (decreased nathan, apprehensive with gait)   Comments Pt initially ambulated with supervision, but then endorsed mod-severe dizziness and sensation of head \"feeling like it needs to explode\". HHA/CGA provided to safely assist pt back to the room   Bed Mobility    Supine to Sit Modified Independent  (HOB elevated)   Sit to Supine Modified Independent  (HOB elevated)   Scooting Independent   Functional Mobility   Sit to Stand Independent   Toilet Transfers Independent   Mobility Pt ambulated in/out of bathroom with supervision. No LOB.   Comments Educated pt on tolieting position to optimize ease of bowel movement (feet propped up on item), pelvic rocking, etc. Pt only able to pass gas this date.   How much difficulty does the patient currently have...   Turning over in bed (including adjusting bedclothes, sheets and blankets)? 4   Sitting down on and standing up from a chair with arms (e.g., wheelchair, bedside commode, etc.) 4   Moving from lying on back to sitting on the side of the bed? 4   How much help from another person does the patient currently need...   Moving to and from a bed to a chair (including a wheelchair)? 4   Need to walk in a hospital room? 3   Climbing 3-5 steps with a railing? 3   6 clicks Mobility Score 22   Short Term Goals    Short Term Goal # 1 Pt will ambulate 150' SUP w/out AD in 6 visits to improve access to environment   Education Group   Education Provided Role of Physical Therapist   Additional Comments Educated pt on CSF/skull based precautions. Discussed avoiding valsalva with tasks, and breathing techniques " during movements. Discussed BP elevated likely impacting pressure. Educated pt on diet/fluid intake, mobility, and ILU abdominal massage to mitigate constipation. Discussed activity restrictions due to medical concerns, and not mobility/musculoskeletal in nature.   Problem List    Problems Impaired Ambulation;Decreased Activity Tolerance   Anticipated Discharge Equipment and Recommendations   DC Equipment Recommendations None   Discharge Recommendations Anticipate that the patient will have no further physical therapy needs after discharge from the hospital  (Pending progress)   Interdisciplinary Plan of Care Collaboration   IDT Collaboration with  Nursing   Patient Position at End of Therapy In Bed;Call Light within Reach;Tray Table within Reach   Collaboration Comments Discussed with RN pt requires medical optimization at this time to improve mobility   Session Information   Date / Session Number  8/15- 1(1/3, 8/21)

## 2022-08-15 NOTE — CARE PLAN
Problem: Pain - Standard  Goal: Alleviation of pain or a reduction in pain to the patient’s comfort goal  Outcome: Progressing     Problem: Knowledge Deficit - Standard  Goal: Patient and family/care givers will demonstrate understanding of plan of care, disease process/condition, diagnostic tests and medications  Outcome: Progressing     Problem: Psychosocial  Goal: Patient's level of anxiety will decrease  Outcome: Progressing  Goal: Patient's ability to re-evaluate and adapt role responsibilities will improve  Outcome: Progressing  Goal: Patient and family will demonstrate ability to cope with life altering diagnosis and/or procedure  Outcome: Progressing  Goal: Spiritual and cultural needs incorporated into hospitalization  Outcome: Progressing     Problem: Hemodynamics  Goal: Patient's hemodynamics, fluid balance and neurologic status will be stable or improve  Outcome: Progressing     Problem: Risk for Aspiration  Goal: Patient's risk for aspiration will be absent or decrease  Outcome: Progressing     Problem: Urinary - Renal Perfusion  Goal: Ability to achieve and maintain adequate renal perfusion and functioning will improve  Outcome: Progressing     Problem: Venous Thromboembolism (VTE) Prevention  Goal: The patient will remain free from venous thromboembolism (VTE)  Outcome: Progressing     Problem: Nutrition  Goal: Patient's nutritional and fluid intake will be adequate or improve  Outcome: Progressing  Goal: Enteral nutrition will be maintained or improve  Outcome: Progressing  Goal: Enteral nutrition will be maintained or improve  Outcome: Progressing     Problem: Urinary Elimination  Goal: Establish and maintain regular urinary output  Outcome: Progressing     Problem: Bowel Elimination  Goal: Establish and maintain regular bowel function  Outcome: Progressing     Problem: Skin Integrity  Goal: Skin integrity is maintained or improved  Outcome: Progressing   The patient is Stable - Low risk of  patient condition declining or worsening    Shift Goals  Clinical Goals: Pain Control  Patient Goals: Safety  Family Goals: MARY LOU    Progress made toward(s) clinical / shift goals:  Patient had adequate pain control

## 2022-08-16 VITALS
OXYGEN SATURATION: 95 % | TEMPERATURE: 97.3 F | SYSTOLIC BLOOD PRESSURE: 141 MMHG | DIASTOLIC BLOOD PRESSURE: 94 MMHG | RESPIRATION RATE: 18 BRPM | WEIGHT: 293 LBS | HEIGHT: 69 IN | HEART RATE: 91 BPM | BODY MASS INDEX: 43.4 KG/M2

## 2022-08-16 LAB — CORTIS SERPL-MCNC: 7.3 UG/DL (ref 0–23)

## 2022-08-16 PROCEDURE — 82533 TOTAL CORTISOL: CPT

## 2022-08-16 PROCEDURE — A9270 NON-COVERED ITEM OR SERVICE: HCPCS | Performed by: NURSE PRACTITIONER

## 2022-08-16 PROCEDURE — 94640 AIRWAY INHALATION TREATMENT: CPT

## 2022-08-16 PROCEDURE — 700102 HCHG RX REV CODE 250 W/ 637 OVERRIDE(OP): Performed by: NURSE PRACTITIONER

## 2022-08-16 RX ADMIN — OXYCODONE 5 MG: 5 TABLET ORAL at 06:04

## 2022-08-16 ASSESSMENT — PAIN DESCRIPTION - PAIN TYPE: TYPE: ACUTE PAIN

## 2022-08-16 NOTE — PROGRESS NOTES
Patient discharged home in stable condition, PIV removed, discharge instructions reviewed and signed, all questions answered.  Patient has received home medications and [facility]

## 2022-08-25 ENCOUNTER — OFFICE VISIT (OUTPATIENT)
Dept: MEDICAL GROUP | Facility: MEDICAL CENTER | Age: 42
End: 2022-08-25
Attending: INTERNAL MEDICINE
Payer: MEDICAID

## 2022-08-25 VITALS
HEIGHT: 69 IN | DIASTOLIC BLOOD PRESSURE: 92 MMHG | BODY MASS INDEX: 43.4 KG/M2 | WEIGHT: 293 LBS | SYSTOLIC BLOOD PRESSURE: 140 MMHG | HEART RATE: 84 BPM | OXYGEN SATURATION: 96 % | TEMPERATURE: 98.9 F | RESPIRATION RATE: 16 BRPM

## 2022-08-25 DIAGNOSIS — I10 PRIMARY HYPERTENSION: ICD-10-CM

## 2022-08-25 DIAGNOSIS — E03.8 SECONDARY HYPOTHYROIDISM: ICD-10-CM

## 2022-08-25 DIAGNOSIS — D35.2 PITUITARY MACROADENOMA (HCC): ICD-10-CM

## 2022-08-25 PROCEDURE — 99212 OFFICE O/P EST SF 10 MIN: CPT | Performed by: INTERNAL MEDICINE

## 2022-08-25 PROCEDURE — 99214 OFFICE O/P EST MOD 30 MIN: CPT | Performed by: INTERNAL MEDICINE

## 2022-08-25 RX ORDER — AMOXICILLIN AND CLAVULANATE POTASSIUM 875; 125 MG/1; MG/1
1 TABLET, FILM COATED ORAL 2 TIMES DAILY
COMMUNITY
Start: 2022-08-19 | End: 2023-02-08

## 2022-08-25 RX ORDER — HYDROCORTISONE 5 MG/1
TABLET ORAL
COMMUNITY
Start: 2022-08-15 | End: 2023-02-08

## 2022-08-25 RX ORDER — OXYCODONE HYDROCHLORIDE AND ACETAMINOPHEN 5; 325 MG/1; MG/1
TABLET ORAL
COMMUNITY
Start: 2022-08-19 | End: 2023-02-08

## 2022-08-25 RX ORDER — HYDROCORTISONE 10 MG/1
TABLET ORAL
COMMUNITY
Start: 2022-08-15 | End: 2022-09-29 | Stop reason: SDUPTHER

## 2022-08-25 RX ORDER — LEVOTHYROXINE SODIUM 0.05 MG/1
50 TABLET ORAL
Qty: 30 TABLET | Refills: 2 | Status: SHIPPED | OUTPATIENT
Start: 2022-08-25 | End: 2022-09-19 | Stop reason: SDUPTHER

## 2022-08-25 ASSESSMENT — FIBROSIS 4 INDEX: FIB4 SCORE: 0.86

## 2022-08-25 NOTE — PROGRESS NOTES
Subjective:   Ernesto Ramos is a 42 y.o. female here today for f/u post op, endocrinology referral, blood pressure    Pituitary macroadenoma (HCC)  She is status post resection with Dr. Mejia and Dr. Pena.  She also ended up having multiple sinus procedures done and her deviated septum corrected.  She reports she is recovering well from this surgery.  She continues to have some mild pain over the bridge of her nose and sometimes beneath her left eye and the roof of her mouth.  She still has Percocet remaining but has not needed to use it recently.  She had a follow-up with her neurosurgeon earlier today and he encouraged her to get established with an endocrinologist.  Postoperatively, she was started on hydrocortisone which she continues to take.  She has also been on levothyroxine 50 mcg daily.    Primary hypertension  Blood pressure has been elevated on several of her last outpatient visits.  She does not check at home as she does not have a blood pressure cuff.  She is currently not on any medication for blood pressure control and states that the only time she has been placed on medication has been when she is in the hospital.  Of note, she is on hydrocortisone, and we discussed that this can raise her blood pressure.  We discussed low-sodium/DASH diet.       Current medicines (including changes today)  Current Outpatient Medications   Medication Sig Dispense Refill    amoxicillin-clavulanate (AUGMENTIN) 875-125 MG Tab Take 1 Tablet by mouth 2 times a day.      hydrocortisone (CORTEF) 10 MG Tab       oxyCODONE-acetaminophen (PERCOCET) 5-325 MG Tab TAKE 1 TO 2 TABLETS BY MOUTH EVERY 4 HOURS AS NEEDED FOR PAIN FOR 7 DAYS      levothyroxine (SYNTHROID) 50 MCG Tab Take 1 Tablet by mouth every morning on an empty stomach. 30 Tablet 2    QVAR REDIHALER 80 MCG/ACT inhaler Inhale 2 Puffs 2 times a day.      hydrocortisone (CORTEF) 5 MG Tab        No current facility-administered medications for this visit.      She  has a past medical history of Allergy, Anemia (2018), Anesthesia (07/28/2022), Asthma (07/28/2022), Bowel habit changes (07/28/2022), Breath shortness (07/28/2022), Chronic sinusitis, Disorder of thyroid (07/28/2022), GERD (gastroesophageal reflux disease), Head ache (07/28/2022), Heart burn (07/28/2022), Hydradenitis, Obesity, Psychiatric problem (07/28/2022), and Snoring (07/28/2022).         Objective:     Vitals:    08/25/22 1253   BP: (!) 140/92   Pulse: 84   Resp: 16   Temp: 37.2 °C (98.9 °F)   SpO2: 96%     Body mass index is 59.37 kg/m².   Physical Exam:  Constitutional: Alert, no distress.  Skin: Warm, dry, good turgor, no rashes in visible areas.  Eye: Equal, round and reactive, conjunctiva clear, lids normal.  Psych: Alert and oriented x3, normal affect and mood.      Assessment and Plan:   The following treatment plan was discussed    1. Secondary hypothyroidism  - Referral to Endocrinology  - levothyroxine (SYNTHROID) 50 MCG Tab; Take 1 Tablet by mouth every morning on an empty stomach.  Dispense: 30 Tablet; Refill: 2    2. Pituitary macroadenoma (HCC)  - Referral to Endocrinology    3. Hypertension  She likely has some underlying primary hypertension however question how much her blood pressure is up due to the hydrocortisone she is taking.  Hopefully she will be able to wean off of this under the guidance of endocrinology.  She will purchase a home blood pressure cuff for continued home monitoring and [facility] with updated blood pressure readings.  If appropriate, we will initiate low-dose antihypertensive therapy with follow-up after she is off of the hydrocortisone to see if it is still warranted.        Followup: Return if symptoms worsen or fail to improve.

## 2022-08-25 NOTE — ASSESSMENT & PLAN NOTE
She is status post resection with Dr. Mejia and Dr. Pena.  She also ended up having multiple sinus procedures done and her deviated septum corrected.  She reports she is recovering well from this surgery.  She continues to have some mild pain over the bridge of her nose and sometimes beneath her left eye and the roof of her mouth.  She still has Percocet remaining but has not needed to use it recently.  She had a follow-up with her neurosurgeon earlier today and he encouraged her to get established with an endocrinologist.  Postoperatively, she was started on hydrocortisone which she continues to take.  She has also been on levothyroxine 50 mcg daily.

## 2022-08-25 NOTE — ASSESSMENT & PLAN NOTE
Blood pressure has been elevated on several of her last outpatient visits.  She does not check at home as she does not have a blood pressure cuff.  She is currently not on any medication for blood pressure control and states that the only time she has been placed on medication has been when she is in the hospital.  Of note, she is on hydrocortisone, and we discussed that this can raise her blood pressure.  We discussed low-sodium/DASH diet.

## 2022-08-29 ENCOUNTER — HOSPITAL ENCOUNTER (OUTPATIENT)
Dept: RADIOLOGY | Facility: MEDICAL CENTER | Age: 42
End: 2022-08-29
Payer: MEDICAID

## 2022-09-01 ENCOUNTER — HOSPITAL ENCOUNTER (OUTPATIENT)
Dept: RADIOLOGY | Facility: MEDICAL CENTER | Age: 42
End: 2022-09-01
Attending: INTERNAL MEDICINE
Payer: MEDICAID

## 2022-09-01 DIAGNOSIS — Z12.31 ENCOUNTER FOR MAMMOGRAM TO ESTABLISH BASELINE MAMMOGRAM: ICD-10-CM

## 2022-09-01 PROCEDURE — 77063 BREAST TOMOSYNTHESIS BI: CPT

## 2022-09-06 LAB
FUNGUS SPEC CULT: NORMAL
FUNGUS SPEC FUNGUS STN: NORMAL
SIGNIFICANT IND 70042: NORMAL
SITE SITE: NORMAL
SOURCE SOURCE: NORMAL

## 2022-09-14 ENCOUNTER — HOSPITAL ENCOUNTER (OUTPATIENT)
Facility: MEDICAL CENTER | Age: 42
End: 2022-09-14
Attending: OTOLARYNGOLOGY
Payer: MEDICAID

## 2022-09-14 LAB
GRAM STN SPEC: NORMAL
SIGNIFICANT IND 70042: NORMAL
SITE SITE: NORMAL
SOURCE SOURCE: NORMAL

## 2022-09-14 PROCEDURE — 87205 SMEAR GRAM STAIN: CPT

## 2022-09-14 PROCEDURE — 87077 CULTURE AEROBIC IDENTIFY: CPT

## 2022-09-14 PROCEDURE — 87070 CULTURE OTHR SPECIMN AEROBIC: CPT

## 2022-09-14 PROCEDURE — 87186 SC STD MICRODIL/AGAR DIL: CPT

## 2022-09-16 LAB
BACTERIA WND AEROBE CULT: ABNORMAL
BACTERIA WND AEROBE CULT: ABNORMAL
GRAM STN SPEC: ABNORMAL
SIGNIFICANT IND 70042: ABNORMAL
SITE SITE: ABNORMAL
SOURCE SOURCE: ABNORMAL

## 2022-09-18 ENCOUNTER — PATIENT MESSAGE (OUTPATIENT)
Dept: MEDICAL GROUP | Facility: MEDICAL CENTER | Age: 42
End: 2022-09-18
Payer: MEDICAID

## 2022-09-19 ENCOUNTER — HOSPITAL ENCOUNTER (OUTPATIENT)
Dept: RADIOLOGY | Facility: MEDICAL CENTER | Age: 42
End: 2022-09-19
Attending: NURSE PRACTITIONER
Payer: MEDICAID

## 2022-09-19 DIAGNOSIS — E03.8 SECONDARY HYPOTHYROIDISM: ICD-10-CM

## 2022-09-19 DIAGNOSIS — D35.2 BENIGN NEOPLASM OF PITUITARY GLAND (HCC): ICD-10-CM

## 2022-09-19 PROCEDURE — 700117 HCHG RX CONTRAST REV CODE 255: Performed by: NURSE PRACTITIONER

## 2022-09-19 PROCEDURE — A9576 INJ PROHANCE MULTIPACK: HCPCS | Performed by: NURSE PRACTITIONER

## 2022-09-19 PROCEDURE — 70553 MRI BRAIN STEM W/O & W/DYE: CPT

## 2022-09-19 RX ADMIN — GADOTERIDOL 20 ML: 279.3 INJECTION, SOLUTION INTRAVENOUS at 14:30

## 2022-09-20 RX ORDER — LEVOTHYROXINE SODIUM 0.05 MG/1
50 TABLET ORAL
Qty: 30 TABLET | Refills: 2 | Status: SHIPPED | OUTPATIENT
Start: 2022-09-20 | End: 2023-02-08 | Stop reason: SDUPTHER

## 2022-10-03 ENCOUNTER — PATIENT MESSAGE (OUTPATIENT)
Dept: MEDICAL GROUP | Facility: MEDICAL CENTER | Age: 42
End: 2022-10-03
Payer: MEDICAID

## 2022-10-04 ENCOUNTER — PATIENT MESSAGE (OUTPATIENT)
Dept: MEDICAL GROUP | Facility: MEDICAL CENTER | Age: 42
End: 2022-10-04
Payer: MEDICAID

## 2022-10-04 DIAGNOSIS — J01.90 SUBACUTE SINUSITIS, UNSPECIFIED LOCATION: ICD-10-CM

## 2022-10-05 RX ORDER — DOXYCYCLINE 100 MG/1
100 CAPSULE ORAL 2 TIMES DAILY
Qty: 28 CAPSULE | Refills: 0 | Status: SHIPPED | OUTPATIENT
Start: 2022-10-05 | End: 2022-10-19

## 2022-10-06 ENCOUNTER — GYNECOLOGY VISIT (OUTPATIENT)
Dept: OBGYN | Facility: CLINIC | Age: 42
End: 2022-10-06
Payer: MEDICAID

## 2022-10-06 ENCOUNTER — HOSPITAL ENCOUNTER (OUTPATIENT)
Facility: MEDICAL CENTER | Age: 42
End: 2022-10-06
Attending: NURSE PRACTITIONER
Payer: MEDICAID

## 2022-10-06 VITALS — SYSTOLIC BLOOD PRESSURE: 131 MMHG | WEIGHT: 293 LBS | DIASTOLIC BLOOD PRESSURE: 88 MMHG | BODY MASS INDEX: 60.55 KG/M2

## 2022-10-06 DIAGNOSIS — E28.319 EARLY MENOPAUSE OCCURRING IN PATIENT AGE YOUNGER THAN 45 YEARS: ICD-10-CM

## 2022-10-06 DIAGNOSIS — Z12.4 CERVICAL CANCER SCREENING: ICD-10-CM

## 2022-10-06 DIAGNOSIS — Z01.419 ENCOUNTER FOR ANNUAL ROUTINE GYNECOLOGICAL EXAMINATION: ICD-10-CM

## 2022-10-06 PROCEDURE — 88175 CYTOPATH C/V AUTO FLUID REDO: CPT

## 2022-10-06 PROCEDURE — 87591 N.GONORRHOEAE DNA AMP PROB: CPT

## 2022-10-06 PROCEDURE — G0101 CA SCREEN;PELVIC/BREAST EXAM: HCPCS | Performed by: NURSE PRACTITIONER

## 2022-10-06 PROCEDURE — 87491 CHLMYD TRACH DNA AMP PROBE: CPT

## 2022-10-06 ASSESSMENT — FIBROSIS 4 INDEX: FIB4 SCORE: 0.86

## 2022-10-06 NOTE — PROGRESS NOTES
HPI Comments: Ernesto Ramos is a 42 y.o. y.o. female who presents for her Gynecologic Exam.       Pt has concerns due to her restarting her period, She last had a period in 2/2022, then nothing until this past month. She was dx with a pituitary tumor in August and has been treated for this. She is wondering if the pituitary tumor put her in early menopause and since being treated, will she continue to have periods?   Patient's last menstrual period was 09/22/2022.  Pt not sexually active currently  She is currently not on BCM  Pt states she suffers from Suppurative Hidradenitis, treats with warm compresses and antibacterial body wash with good results.      ROS: Patient is feeling well. No dyspnea or chest pain on exertion. No Abdominal pain, change in bowel habits, black or bloody stools. No urinary sx. GYN ROS:no breast pain or new or enlarging lumps on self exam.  Negative mammogram on 9/8/2022.    No neurological complaints.  Pertinent positives documented in HPI and all other systems reviewed & are negative    All PMH, PSH, allergies, social history and FH reviewed and updated today:  Past Medical History:   Diagnosis Date    Allergy     Anemia 2018    Anesthesia 07/28/2022    restless on emergence    Asthma 07/28/2022    uses inhaler    Bowel habit changes 07/28/2022    constipation    Breath shortness 07/28/2022    with exertion and stress    Chronic sinusitis     Disorder of thyroid 07/28/2022    medicated    GERD (gastroesophageal reflux disease)     Head ache 07/28/2022    intermittently    Heart burn 07/28/2022    stress related non medicated    Hydradenitis     Hydradenitis     Obesity     Psychiatric problem 07/28/2022    depression/anxiety/panic attacks    Snoring 07/28/2022     Past Surgical History:   Procedure Laterality Date    [place] THERAPEUTIC SPINAL PUNCTURE DRAINAGE CSF  8/11/2022    Procedure: LUMBAR PUNCTURE, WITH DRAIN INSERTION;  Surgeon: Norman Mejia M.D.;  Location: SURGERY [facility]  [place];  Service: Neurosurgery    WA STEREOTACTIC COMP ASSIST PROC,CRANIAL,INT*  8/11/2022    Procedure: ENDOSCOPIC TRANSSPHENOIDAL RESECTION OF PITUITARY TUMOR, UTILIZATION OF IMAGE GUIDANCE, INTRADURAL, SEPTOPLASTY, BILATERAL INFERIOR TURBINATE REDUCATION, BILATERAL MAXILLARY ANTROSTOMY, BILATERAL TOTAL ETHMOIDECTOMY;  Surgeon: Mary Pena M.D.;  Location: SURGERY [facility];  Service: Ent    WA REPAIR OF NASAL SEPTUM  8/11/2022    Procedure: SEPTOPLASTY;  Surgeon: Mary Pena M.D.;  Location: SURGERY [facility];  Service: Ent    WA EXCISION TURBINATE,SUBMUCOUS  8/11/2022    Procedure: REDUCTION, NASAL TURBINATE;  Surgeon: Mary Pena M.D.;  Location: SURGERY [facility];  Service: Ent    WA NASAL SCOPY,OPEN MAXILL SINUS  8/11/2022    Procedure: MAXILLARY ANTROSTOMY;  Surgeon: Mary Pena M.D.;  Location: SURGERY [facility];  Service: Ent    WA NASAL SCOPY,REMV TOTL ETHMOID  8/11/2022    Procedure: ETHMOIDECTOMY;  Surgeon: Mary Pena M.D.;  Location: SURGERY [facility];  Service: Ent    TRANSSPHENOIDAL RESECTION  8/11/2022    Procedure: STEALTH TRANSSPHENOLDAL PITUITARY RESECTION AND LUMBAR DRAIN;  Surgeon: Norman Mejia M.D.;  Location: SURGERY [facility];  Service: Neurosurgery    OTHER 2000    skin and sweat gland removal of left axilla for hydradenitis     Bloodless, [place] oil, Cat hair extract, and Kiwi extract  Social History     Socioeconomic History    Marital status: Single   Tobacco Use    Smoking status: Never    Smokeless tobacco: Never   Vaping Use    Vaping Use: Never used   Substance and Sexual Activity    Alcohol use: Yes     Comment: rarely    Drug use: Never    Sexual activity: Not Currently     Birth control/protection: Abstinence     Family History   Problem Relation Age of Onset    Hypertension Mother     Diabetes Mother     Cancer Mother 40        thyroid    Stroke Mother     Hypertension Father     Breast Cancer Sister 48    Heart Disease Neg Hx       Medications:   Current Outpatient Medications Ordered in Epic   Medication Sig Dispense Refill    hydrocortisone (CORTEF) 10 MG Tab Take 10 mg Qam and 5 mg Qnoon 45 Tablet 0    levothyroxine (SYNTHROID) 50 MCG Tab Take 1 Tablet by mouth every morning on an empty stomach. 30 Tablet 2    amoxicillin-clavulanate (AUGMENTIN) 875-125 MG Tab Take 1 Tablet by mouth 2 times a day.      hydrocortisone (CORTEF) 5 MG Tab       oxyCODONE-acetaminophen (PERCOCET) 5-325 MG Tab TAKE 1 TO 2 TABLETS BY MOUTH EVERY 4 HOURS AS NEEDED FOR PAIN FOR 7 DAYS      QVAR REDIHALER 80 MCG/ACT inhaler Inhale 2 Puffs 2 times a day.      doxycycline (MONODOX) 100 MG capsule Take 1 Capsule by mouth 2 times a day for 14 days. (Patient not taking: Reported on 10/6/2022) 28 Capsule 0     No current Epic-ordered facility-administered medications on file.          Objective:   Vital measurements:  /88   Wt (!) 410 lb   Body mass index is 60.55 kg/m². (Goal BM I>18 <25)    Physical Exam   Nursing note and vitals reviewed.  Constitutional: She is oriented to person, place, and time. She appears well-developed and well-nourished. No distress.     HEENT:   Head: Normocephalic and atraumatic.   Right Ear: External ear normal.   Left Ear: External ear normal.   Nose: Nose normal.   Eyes: Conjunctivae and EOM are normal. Pupils are equal, round, and reactive to light. No scleral icterus.     Neck: Normal range of motion. Neck supple. No tracheal deviation present. No thyromegaly present.     Pulmonary/Chest: Effort normal and breath sounds normal. No respiratory distress. She has no wheezes. She has no rales. She exhibits no tenderness.     Cardiovascular: Regular, rate and rhythm. No JVD.    Abdominal: Soft. Bowel sounds are normal. She exhibits no distension and no mass. No tenderness. She has no rebound and no guarding.     Breast:  declined    Genitourinary:  Pelvic exam was performed with patient supine.  External genitalia with no abnormal  pigmentation, labial fusion,rash, tenderness, lesion or injury to the labia bilaterally.  Vagina is moist with no lesions, foul discharge, erythema, tenderness or bleeding. No foreign body around the vagina or signs of injury.   Cervix exhibits no motion tenderness, no discharge and no friability.   Uterus is midline not deviated, not enlarged, not fixed and not tender.  Right adnexum displays no mass, no tenderness and no fullness. Left adnexum displays no mass, no tenderness and no fullness.     Musculoskeletal: Normal range of motion. She exhibits no edema and no tenderness.     Lymphadenopathy: She has no cervical adenopathy.     Neurological: She is alert and oriented to person, place, and time. She exhibits normal muscle tone.     Skin: Skin is warm and dry. No rash noted. She is not diaphoretic. No erythema. No pallor.     Psychiatric: She has a normal mood and affect. Her behavior is normal. Judgment and thought content normal.       Assessment:     1. Encounter for annual routine gynecological examination  THINPREP RFLX HPV ASCUS W/CTNG    CANCELED: THINPREP PAP W/HPV       2. Cervical cancer screening  THINPREP RFLX HPV ASCUS W/CTNG    CANCELED: THINPREP PAP W/HPV       3. Early menopause occurring in patient age younger than 45 years            Assessment:  well woman visit with cervical cancer screening. We discussed the causes of irregular periods in women including pituitary tumors and obesity. Pt states that the pituitary tumor was a wake up call for her to begin losing weight. I explained that she would not be considered post menopausal until she has gone an full 12 months with no period. It was pointed out that as she loses weight, she may resume a normal period. All her questions were answered.      Plan:   Pap and physical exam performed  Monthly SBE.  Counseling: breast self exam and menopause  Encourage exercise and proper diet.  Mammograms annually.  Return to clinic for annual exam

## 2022-10-07 DIAGNOSIS — Z01.419 ENCOUNTER FOR ANNUAL ROUTINE GYNECOLOGICAL EXAMINATION: ICD-10-CM

## 2022-10-07 DIAGNOSIS — Z12.4 CERVICAL CANCER SCREENING: ICD-10-CM

## 2022-10-07 LAB
C TRACH DNA GENITAL QL NAA+PROBE: NEGATIVE
CYTOLOGY REG CYTOL: NORMAL
N GONORRHOEA DNA GENITAL QL NAA+PROBE: NEGATIVE
SPECIMEN SOURCE: NORMAL

## 2023-02-03 ENCOUNTER — HOSPITAL ENCOUNTER (OUTPATIENT)
Dept: LAB | Facility: MEDICAL CENTER | Age: 43
End: 2023-02-03
Attending: PHYSICIAN ASSISTANT
Payer: MEDICAID

## 2023-02-03 LAB
ALBUMIN SERPL BCP-MCNC: 4 G/DL (ref 3.2–4.9)
ALBUMIN/GLOB SERPL: 1.1 G/DL
ALP SERPL-CCNC: 103 U/L (ref 30–99)
ALT SERPL-CCNC: 11 U/L (ref 2–50)
ANION GAP SERPL CALC-SCNC: 12 MMOL/L (ref 7–16)
AST SERPL-CCNC: 16 U/L (ref 12–45)
BILIRUB SERPL-MCNC: 0.2 MG/DL (ref 0.1–1.5)
BUN SERPL-MCNC: 7 MG/DL (ref 8–22)
CALCIUM ALBUM COR SERPL-MCNC: 9 MG/DL (ref 8.5–10.5)
CALCIUM SERPL-MCNC: 9 MG/DL (ref 8.5–10.5)
CHLORIDE SERPL-SCNC: 107 MMOL/L (ref 96–112)
CO2 SERPL-SCNC: 24 MMOL/L (ref 20–33)
CORTIS SERPL-MCNC: 9.7 UG/DL (ref 0–23)
CREAT SERPL-MCNC: 0.87 MG/DL (ref 0.5–1.4)
ESTRADIOL SERPL-MCNC: 152 PG/ML
FSH SERPL-ACNC: 5.9 MIU/ML
GFR SERPLBLD CREATININE-BSD FMLA CKD-EPI: 84 ML/MIN/1.73 M 2
GLOBULIN SER CALC-MCNC: 3.5 G/DL (ref 1.9–3.5)
GLUCOSE SERPL-MCNC: 101 MG/DL (ref 65–99)
LH SERPL-ACNC: 12.8 IU/L
POTASSIUM SERPL-SCNC: 4 MMOL/L (ref 3.6–5.5)
PROLACTIN SERPL-MCNC: 19.3 NG/ML (ref 2.8–26)
PROT SERPL-MCNC: 7.5 G/DL (ref 6–8.2)
SODIUM SERPL-SCNC: 143 MMOL/L (ref 135–145)
T4 FREE SERPL-MCNC: 0.9 NG/DL (ref 0.93–1.7)
TSH SERPL DL<=0.005 MIU/L-ACNC: 3.5 UIU/ML (ref 0.38–5.33)

## 2023-02-03 PROCEDURE — 36415 COLL VENOUS BLD VENIPUNCTURE: CPT

## 2023-02-03 PROCEDURE — 84443 ASSAY THYROID STIM HORMONE: CPT

## 2023-02-03 PROCEDURE — 84439 ASSAY OF FREE THYROXINE: CPT

## 2023-02-03 PROCEDURE — 84305 ASSAY OF SOMATOMEDIN: CPT

## 2023-02-03 PROCEDURE — 84146 ASSAY OF PROLACTIN: CPT

## 2023-02-03 PROCEDURE — 80053 COMPREHEN METABOLIC PANEL: CPT

## 2023-02-03 PROCEDURE — 83002 ASSAY OF GONADOTROPIN (LH): CPT

## 2023-02-03 PROCEDURE — 82670 ASSAY OF TOTAL ESTRADIOL: CPT

## 2023-02-03 PROCEDURE — 82533 TOTAL CORTISOL: CPT

## 2023-02-03 PROCEDURE — 83001 ASSAY OF GONADOTROPIN (FSH): CPT

## 2023-02-06 LAB
IGF-I SERPL-MCNC: 142 NG/ML (ref 69–253)
IGF-I Z-SCORE SERPL: 0

## 2023-02-08 ENCOUNTER — OFFICE VISIT (OUTPATIENT)
Dept: MEDICAL GROUP | Facility: MEDICAL CENTER | Age: 43
End: 2023-02-08
Attending: INTERNAL MEDICINE
Payer: MEDICAID

## 2023-02-08 ENCOUNTER — PHARMACY VISIT (OUTPATIENT)
Dept: PHARMACY | Facility: MEDICAL CENTER | Age: 43
End: 2023-02-08
Payer: COMMERCIAL

## 2023-02-08 VITALS
HEIGHT: 69 IN | DIASTOLIC BLOOD PRESSURE: 82 MMHG | TEMPERATURE: 97.3 F | HEART RATE: 99 BPM | OXYGEN SATURATION: 99 % | SYSTOLIC BLOOD PRESSURE: 128 MMHG | WEIGHT: 293 LBS | RESPIRATION RATE: 16 BRPM | BODY MASS INDEX: 43.4 KG/M2

## 2023-02-08 DIAGNOSIS — J32.9 CHRONIC SINUSITIS, UNSPECIFIED LOCATION: ICD-10-CM

## 2023-02-08 DIAGNOSIS — J45.40 MODERATE PERSISTENT ASTHMA WITHOUT COMPLICATION: ICD-10-CM

## 2023-02-08 DIAGNOSIS — K59.00 CONSTIPATION, UNSPECIFIED CONSTIPATION TYPE: ICD-10-CM

## 2023-02-08 DIAGNOSIS — R21 RASH: ICD-10-CM

## 2023-02-08 DIAGNOSIS — E03.8 SECONDARY HYPOTHYROIDISM: ICD-10-CM

## 2023-02-08 PROBLEM — E23.6 PITUITARY MASS (HCC): Status: RESOLVED | Noted: 2022-06-09 | Resolved: 2023-02-08

## 2023-02-08 PROBLEM — R53.83 FATIGUE: Status: ACTIVE | Noted: 2023-02-08

## 2023-02-08 PROBLEM — Z01.419 ENCOUNTER FOR ANNUAL ROUTINE GYNECOLOGICAL EXAMINATION: Status: RESOLVED | Noted: 2022-10-06 | Resolved: 2023-02-08

## 2023-02-08 PROCEDURE — 99213 OFFICE O/P EST LOW 20 MIN: CPT | Performed by: INTERNAL MEDICINE

## 2023-02-08 PROCEDURE — 99214 OFFICE O/P EST MOD 30 MIN: CPT | Performed by: INTERNAL MEDICINE

## 2023-02-08 PROCEDURE — RXMED WILLOW AMBULATORY MEDICATION CHARGE: Performed by: INTERNAL MEDICINE

## 2023-02-08 RX ORDER — POLYETHYLENE GLYCOL 3350 17 G/17G
17 POWDER, FOR SOLUTION ORAL DAILY
Qty: 510 G | Refills: 3 | Status: SHIPPED | OUTPATIENT
Start: 2023-02-08

## 2023-02-08 RX ORDER — TRIAMCINOLONE ACETONIDE 1 MG/G
CREAM TOPICAL
Qty: 30 G | Refills: 1 | Status: SHIPPED | OUTPATIENT
Start: 2023-02-08

## 2023-02-08 RX ORDER — CEFDINIR 300 MG/1
300 CAPSULE ORAL 2 TIMES DAILY
Qty: 14 CAPSULE | Refills: 0 | Status: SHIPPED | OUTPATIENT
Start: 2023-02-08 | End: 2023-02-15

## 2023-02-08 RX ORDER — LEVOTHYROXINE SODIUM 0.05 MG/1
50 TABLET ORAL
Qty: 30 TABLET | Refills: 2 | Status: SHIPPED | OUTPATIENT
Start: 2023-02-08 | End: 2023-02-08 | Stop reason: SDUPTHER

## 2023-02-08 RX ORDER — LEVOTHYROXINE SODIUM 0.05 MG/1
50 TABLET ORAL
Qty: 30 TABLET | Refills: 0 | Status: SHIPPED | OUTPATIENT
Start: 2023-02-08 | End: 2023-09-27

## 2023-02-08 RX ORDER — SENNOSIDES 8.6 MG
1-2 TABLET ORAL
Qty: 30 TABLET | Refills: 0 | Status: SHIPPED | OUTPATIENT
Start: 2023-02-08

## 2023-02-08 ASSESSMENT — FIBROSIS 4 INDEX: FIB4 SCORE: 0.78

## 2023-02-08 NOTE — PROGRESS NOTES
Subjective:   Ernesto Ramos is a 42 y.o. female here today for rash, sinus infection, constipation, breathing issues, labs    Secondary hypothyroidism  We reviewed her most recent labs.  Her T4 is slightly low although TSH is in the normal range.  She reports not taking her levothyroxine for approximately 3 months.  States that she was not instructed to do this by any of her doctors but that she just stopped getting refills.  Has a meeting with her endocrinologist in a week.      Chronic sinusitis  She reports that over the past 2 weeks she has had worsening nasal discharge with bad taste in her mouth, postnasal drip, subjective fever, headache, and increased congestion.  She found an old antibiotic prescription which she thinks was amoxicillin and she started this on Monday but not sure if it is working.  Has been using her [place] pot regularly and is usually getting yellowish nasal drainage when she uses it.  Last treated for a sinus infection in October.  Reports that initially Augmentin did not help.  I also gave her doxycycline prescription and she cannot remember if this works but believes cefdinir was useful.     Moderate persistent asthma  She reports her breathing remains difficult.  She is getting up about 2-3 times at night a few nights a week with shortness of breath and wheezing.  States that albuterol has never helped her and has in fact caused her increased mucus production and worsening breathing so she really avoids it.  She will take her Qvar if she feels very short of breath and states that after about half an hour she will feel little better.  She has been seeing pulmonology at [place] (Dr. Kang) and is on Qvar 80 mg 2 puffs twice daily.  Reports her last visit with him was in November and no changes were made to her regimen.  She had pulmonary function testing done at that time.  She has had side effects with numerous other inhalers.    Constipation  Complains that she is now having a  bowel movement about once every week.  Has noticed that this has gotten worse since she has been more stressed.  Reports that bowel movements are somewhat hard and painful.  Not currently using any over-the-counter medications to help.  Not drinking enough water per patient.  States that she recently switched diets again and is eating a lot of cabbage.    Rash  Reports that starting in August 2021, she developed a rash on her right flank extending up into the axilla region and also on the left side of her neck.  She states that initially it was a small dry patch and then it spread.  She also noticed it on the left side of her neck and has had intermittent hives as well.  States she tried some over-the-counter corticosteroid cream without improvement.  States that the rash on her right flank has resolved but there is some scarring whereas it persists on her neck.  She denies associated itchiness or pain.        Current medicines (including changes today)  Current Outpatient Medications   Medication Sig Dispense Refill    levothyroxine (SYNTHROID) 50 MCG Tab Take 1 tablet by mouth every morning on an empty stomach. 30 Tablet 0    cefdinir (OMNICEF) 300 MG Cap Take 1 capsule by mouth 2 times a day for 7 days. 14 Capsule 0    polyethylene glycol 3350 (MIRALAX) 17 GM/SCOOP Powder Mix 17 g (1 capful) with liquid and take by mouth every day. 510 g 3    Sennosides (SENNA) 8.6 MG Tab Take 1-2 tablets by mouth at bedtime as needed (constipation). 30 Tablet 0    triamcinolone acetonide (KENALOG) 0.1 % Cream Apply thin layer to rash on neck twice daily as needed 30 g 1    QVAR REDIHALER 80 MCG/ACT inhaler Inhale 2 Puffs 2 times a day.       No current facility-administered medications for this visit.     She  has a past medical history of Allergy, Anemia (2018), Anesthesia (07/28/2022), Asthma (07/28/2022), Bowel habit changes (07/28/2022), Breath shortness (07/28/2022), Chronic sinusitis, Disorder of thyroid (07/28/2022), GERD  (gastroesophageal reflux disease), Head ache (07/28/2022), Heart burn (07/28/2022), Hydradenitis, Hydradenitis, Obesity, Psychiatric problem (07/28/2022), and Snoring (07/28/2022).         Objective:     Vitals:    02/08/23 0929   BP: 128/82   Pulse: 99   Resp: 16   Temp: 36.3 °C (97.3 °F)   SpO2: 99%     Body mass index is 59.34 kg/m².   Physical Exam:  Constitutional: Alert, no distress.  Skin: Warm, dry, good turgor, there are several dry scaling patches over the left lateral neck that appear consistent with eczema  Eye: Equal, round and reactive, conjunctiva clear, lids normal.  Respiratory: Unlabored respiratory effort, breath sounds are distant.  There are scattered wheezes in bilateral upper lobes.    Assessment and Plan:   The following treatment plan was discussed    1. Chronic sinusitis, unspecified location  We discussed treatment with cefdinir as she has responded well to this in the past.  She will see her ENT on the 27th which has already been scheduled to follow-up if symptoms are persistent  - cefdinir (OMNICEF) 300 MG Cap; Take 1 capsule by mouth 2 times a day for 7 days.  Dispense: 14 Capsule; Refill: 0    2. Secondary hypothyroidism  Uncontrolled off of medication.  We discussed reinstating her levothyroxine and she will follow-up with her endocrinologist next week as planned  - levothyroxine (SYNTHROID) 50 MCG Tab; Take 1 tablet by mouth every morning on an empty stomach.  Dispense: 30 Tablet; Refill: 0    3. Moderate persistent asthma without complication  Uncontrolled per patient with numerous nocturnal awakenings however she does not respond well to albuterol either an HFA or nebulized format and has had side effects with other inhalers.  Her pulmonologist recommended she stop her Spiriva which we had started and she did not tolerate Trelegy.  I recommended she follow-up with her pulmonologist to see if he has any additional suggest months on inhalers or medication regimen    4. Constipation,  unspecified constipation type  Uncontrolled.  We discussed starting daily MiraLAX and titrating to have 1 soft bowel movement every 1 to 2 days.  If she does not have a bowel movement within this timeframe she can take the senna as needed  - polyethylene glycol 3350 (MIRALAX) 17 GM/SCOOP Powder; Mix 17 g (1 capful) with liquid and take by mouth every day.  Dispense: 510 g; Refill: 3  - Sennosides (SENNA) 8.6 MG Tab; Take 1-2 tablets by mouth at bedtime as needed (constipation).  Dispense: 30 Tablet; Refill: 0    5. Rash  Appears eczematous in nature and given that she has a lot of atopic you, we discussed trial of Kenalog cream.  If ineffective, she would like a referral to dermatology.  - triamcinolone acetonide (KENALOG) 0.1 % Cream; Apply thin layer to rash on neck twice daily as needed  Dispense: 30 g; Refill: 1        Followup: Return if symptoms worsen or fail to improve.

## 2023-02-08 NOTE — ASSESSMENT & PLAN NOTE
We reviewed her most recent labs.  Her T4 is slightly low although TSH is in the normal range.  She reports not taking her levothyroxine for approximately 3 months.  States that she was not instructed to do this by any of her doctors but that she just stopped getting refills.  Has a meeting with her endocrinologist in a week.

## 2023-02-08 NOTE — ASSESSMENT & PLAN NOTE
Reports that starting in August 2021, she developed a rash on her right flank extending up into the axilla region and also on the left side of her neck.  She states that initially it was a small dry patch and then it spread.  She also noticed it on the left side of her neck and has had intermittent hives as well.  States she tried some over-the-counter corticosteroid cream without improvement.  States that the rash on her right flank has resolved but there is some scarring whereas it persists on her neck.  She denies associated itchiness or pain.

## 2023-02-08 NOTE — ASSESSMENT & PLAN NOTE
Complains that she is now having a bowel movement about once every week.  Has noticed that this has gotten worse since she has been more stressed.  Reports that bowel movements are somewhat hard and painful.  Not currently using any over-the-counter medications to help.  Not drinking enough water per patient.  States that she recently switched diets again and is eating a lot of cabbage.

## 2023-02-08 NOTE — ASSESSMENT & PLAN NOTE
She reports her breathing remains difficult.  She is getting up about 2-3 times at night a few nights a week with shortness of breath and wheezing.  States that albuterol has never helped her and has in fact caused her increased mucus production and worsening breathing so she really avoids it.  She will take her Qvar if she feels very short of breath and states that after about half an hour she will feel little better.  She has been seeing pulmonology at [place] (Dr. Kang) and is on Qvar 80 mg 2 puffs twice daily.  Reports her last visit with him was in November and no changes were made to her regimen.  She had pulmonary function testing done at that time.  She has had side effects with numerous other inhalers.

## 2023-02-08 NOTE — ASSESSMENT & PLAN NOTE
She reports that over the past 2 weeks she has had worsening nasal discharge with bad taste in her mouth, postnasal drip, subjective fever, headache, and increased congestion.  She found an old antibiotic prescription which she thinks was amoxicillin and she started this on Monday but not sure if it is working.  Has been using her [place] pot regularly and is usually getting yellowish nasal drainage when she uses it.  Last treated for a sinus infection in October.  Reports that initially Augmentin did not help.  I also gave her doxycycline prescription and she cannot remember if this works but believes cefdinir was useful.

## 2023-02-22 ENCOUNTER — HOSPITAL ENCOUNTER (OUTPATIENT)
Dept: RADIOLOGY | Facility: MEDICAL CENTER | Age: 43
End: 2023-02-22
Attending: NEUROLOGICAL SURGERY
Payer: MEDICAID

## 2023-02-22 DIAGNOSIS — D35.2 BENIGN NEOPLASM OF PITUITARY GLAND (HCC): ICD-10-CM

## 2023-02-22 PROCEDURE — 700117 HCHG RX CONTRAST REV CODE 255: Performed by: NEUROLOGICAL SURGERY

## 2023-02-22 PROCEDURE — 70553 MRI BRAIN STEM W/O & W/DYE: CPT

## 2023-02-22 PROCEDURE — A9579 GAD-BASE MR CONTRAST NOS,1ML: HCPCS | Performed by: NEUROLOGICAL SURGERY

## 2023-02-22 RX ADMIN — GADOTERIDOL 20 ML: 279.3 INJECTION, SOLUTION INTRAVENOUS at 11:19

## 2023-02-24 ENCOUNTER — DOCUMENTATION (OUTPATIENT)
Dept: OCCUPATIONAL MEDICINE | Facility: CLINIC | Age: 43
End: 2023-02-24
Payer: MEDICAID

## 2023-02-24 NOTE — PROGRESS NOTES
Pt has an appointment for Pre-Employment Physical with [facility] on 02/28/2023.    Currently missing the following vaccine documentation:    Tdap  COVID-19  Varicella  MMR  Hep B  Flu Shot    Contacted via email on 02/24/2023, requesting missing documentation. If unable to obtain by the appointment  date, lab titers will be drawn, and/or vaccines will be given.

## 2023-02-28 ENCOUNTER — EH NON-PROVIDER (OUTPATIENT)
Dept: OCCUPATIONAL MEDICINE | Facility: CLINIC | Age: 43
End: 2023-02-28

## 2023-02-28 ENCOUNTER — EMPLOYEE HEALTH (OUTPATIENT)
Dept: OCCUPATIONAL MEDICINE | Facility: CLINIC | Age: 43
End: 2023-02-28

## 2023-02-28 ENCOUNTER — HOSPITAL ENCOUNTER (OUTPATIENT)
Facility: MEDICAL CENTER | Age: 43
End: 2023-02-28
Attending: NURSE PRACTITIONER
Payer: COMMERCIAL

## 2023-02-28 DIAGNOSIS — Z02.1 PRE-EMPLOYMENT HEALTH SCREENING EXAMINATION: ICD-10-CM

## 2023-02-28 DIAGNOSIS — Z02.89 ENCOUNTER FOR OCCUPATIONAL HEALTH ASSESSMENT: Primary | ICD-10-CM

## 2023-02-28 DIAGNOSIS — Z02.89 ENCOUNTER FOR OCCUPATIONAL HEALTH ASSESSMENT: ICD-10-CM

## 2023-02-28 LAB
AMP AMPHETAMINE: NORMAL
BAR BARBITURATES: NORMAL
BZO BENZODIAZEPINES: NORMAL
COC COCAINE: NORMAL
INT CON NEG: NORMAL
INT CON POS: NORMAL
MDMA ECSTASY: NORMAL
MET METHAMPHETAMINES: NORMAL
MTD METHADONE: NORMAL
OPI OPIATES: NORMAL
OXY OXYCODONE: NORMAL
PCP PHENCYCLIDINE: NORMAL
POC URINE DRUG SCREEN OCDRS: NORMAL
THC: NORMAL

## 2023-02-28 PROCEDURE — 80305 DRUG TEST PRSMV DIR OPT OBS: CPT | Performed by: NURSE PRACTITIONER

## 2023-02-28 PROCEDURE — 8915 PR COMPREHENSIVE PHYSICAL: Performed by: NURSE PRACTITIONER

## 2023-02-28 PROCEDURE — 86787 VARICELLA-ZOSTER ANTIBODY: CPT | Performed by: NURSE PRACTITIONER

## 2023-02-28 PROCEDURE — 90715 TDAP VACCINE 7 YRS/> IM: CPT | Performed by: NURSE PRACTITIONER

## 2023-02-28 PROCEDURE — 86480 TB TEST CELL IMMUN MEASURE: CPT | Performed by: NURSE PRACTITIONER

## 2023-02-28 PROCEDURE — 86762 RUBELLA ANTIBODY: CPT | Performed by: NURSE PRACTITIONER

## 2023-02-28 PROCEDURE — 86765 RUBEOLA ANTIBODY: CPT | Performed by: NURSE PRACTITIONER

## 2023-02-28 PROCEDURE — 86735 MUMPS ANTIBODY: CPT | Performed by: NURSE PRACTITIONER

## 2023-03-02 LAB
GAMMA INTERFERON BACKGROUND BLD IA-ACNC: 0.06 IU/ML
M TB IFN-G BLD-IMP: NEGATIVE
M TB IFN-G CD4+ BCKGRND COR BLD-ACNC: 0 IU/ML
MEV IGG SER-ACNC: >300 AU/ML
MITOGEN IGNF BCKGRD COR BLD-ACNC: >10 IU/ML
MUV IGG SER IA-ACNC: 230 AU/ML
QFT TB2 - NIL TBQ2: 0.01 IU/ML
RUBV AB SER QL: 286 IU/ML
VZV IGG SER IA-ACNC: 3395 IV

## 2023-03-03 ENCOUNTER — EH NON-PROVIDER (OUTPATIENT)
Dept: OCCUPATIONAL MEDICINE | Facility: CLINIC | Age: 43
End: 2023-03-03

## 2023-03-23 ENCOUNTER — PATIENT MESSAGE (OUTPATIENT)
Dept: MEDICAL GROUP | Facility: MEDICAL CENTER | Age: 43
End: 2023-03-23
Payer: MEDICAID

## 2023-03-23 DIAGNOSIS — J45.40 MODERATE PERSISTENT ASTHMA WITHOUT COMPLICATION: ICD-10-CM

## 2023-03-23 PROCEDURE — RXMED WILLOW AMBULATORY MEDICATION CHARGE: Performed by: INTERNAL MEDICINE

## 2023-03-23 RX ORDER — BECLOMETHASONE DIPROPIONATE HFA 80 UG/1
2 AEROSOL, METERED RESPIRATORY (INHALATION) 2 TIMES DAILY
Qty: 10.6 G | Refills: 5 | Status: SHIPPED | OUTPATIENT
Start: 2023-03-23 | End: 2023-11-22 | Stop reason: SDUPTHER

## 2023-03-23 NOTE — PATIENT COMMUNICATION
Received request via: Patient    Was the patient seen in the last year in this department? Yes    Does the patient have an active prescription (recently filled or refills available) for medication(s) requested? No    Does the patient have [facility] and need 100 day supply (blood pressure, diabetes and cholesterol meds only)? Patient does not have SCP  Requested Prescriptions     Pending Prescriptions Disp Refills    QVAR REDIHALER 80 MCG/ACT inhaler  0     Sig: Inhale 2 Puffs 2 times a day.

## 2023-03-28 ENCOUNTER — PHARMACY VISIT (OUTPATIENT)
Dept: PHARMACY | Facility: MEDICAL CENTER | Age: 43
End: 2023-03-28
Payer: COMMERCIAL

## 2023-04-29 PROCEDURE — RXMED WILLOW AMBULATORY MEDICATION CHARGE: Performed by: INTERNAL MEDICINE

## 2023-05-03 ENCOUNTER — PHARMACY VISIT (OUTPATIENT)
Dept: PHARMACY | Facility: MEDICAL CENTER | Age: 43
End: 2023-05-03
Payer: COMMERCIAL

## 2023-05-14 ENCOUNTER — PATIENT MESSAGE (OUTPATIENT)
Dept: MEDICAL GROUP | Facility: MEDICAL CENTER | Age: 43
End: 2023-05-14
Payer: MEDICAID

## 2023-05-14 DIAGNOSIS — F33.1 MODERATE EPISODE OF RECURRENT MAJOR DEPRESSIVE DISORDER (HCC): ICD-10-CM

## 2023-05-14 DIAGNOSIS — F41.9 ANXIETY: ICD-10-CM

## 2023-05-18 ENCOUNTER — PHARMACY VISIT (OUTPATIENT)
Dept: PHARMACY | Facility: MEDICAL CENTER | Age: 43
End: 2023-05-18
Payer: COMMERCIAL

## 2023-05-18 ENCOUNTER — OFFICE VISIT (OUTPATIENT)
Dept: MEDICAL GROUP | Facility: MEDICAL CENTER | Age: 43
End: 2023-05-18
Attending: INTERNAL MEDICINE
Payer: MEDICAID

## 2023-05-18 VITALS
OXYGEN SATURATION: 94 % | DIASTOLIC BLOOD PRESSURE: 90 MMHG | RESPIRATION RATE: 16 BRPM | HEART RATE: 96 BPM | BODY MASS INDEX: 43.4 KG/M2 | SYSTOLIC BLOOD PRESSURE: 140 MMHG | HEIGHT: 69 IN | TEMPERATURE: 98.1 F | WEIGHT: 293 LBS

## 2023-05-18 DIAGNOSIS — F41.9 ANXIETY: ICD-10-CM

## 2023-05-18 DIAGNOSIS — F33.2 SEVERE EPISODE OF RECURRENT MAJOR DEPRESSIVE DISORDER, WITHOUT PSYCHOTIC FEATURES (HCC): ICD-10-CM

## 2023-05-18 PROCEDURE — RXMED WILLOW AMBULATORY MEDICATION CHARGE: Performed by: INTERNAL MEDICINE

## 2023-05-18 PROCEDURE — 3077F SYST BP >= 140 MM HG: CPT | Performed by: INTERNAL MEDICINE

## 2023-05-18 PROCEDURE — 3080F DIAST BP >= 90 MM HG: CPT | Performed by: INTERNAL MEDICINE

## 2023-05-18 PROCEDURE — 99213 OFFICE O/P EST LOW 20 MIN: CPT | Performed by: INTERNAL MEDICINE

## 2023-05-18 PROCEDURE — 99215 OFFICE O/P EST HI 40 MIN: CPT | Performed by: INTERNAL MEDICINE

## 2023-05-18 RX ORDER — ESCITALOPRAM OXALATE 10 MG/1
TABLET ORAL
Qty: 30 TABLET | Refills: 2 | Status: SHIPPED | OUTPATIENT
Start: 2023-05-18 | End: 2023-06-30

## 2023-05-18 RX ORDER — HYDROXYZINE HYDROCHLORIDE 25 MG/1
12.5-25 TABLET, FILM COATED ORAL 3 TIMES DAILY PRN
Qty: 30 TABLET | Refills: 2 | Status: SHIPPED | OUTPATIENT
Start: 2023-05-18 | End: 2023-06-30 | Stop reason: SDUPTHER

## 2023-05-18 ASSESSMENT — FIBROSIS 4 INDEX: FIB4 SCORE: 0.78

## 2023-05-18 NOTE — ASSESSMENT & PLAN NOTE
She reports initially being diagnosed with depression when she was 10 years old and has been dealing with it off and on throughout her life.  At age 17 and again at age 19, she reports suicide attempts for which she was hospitalized for 2 weeks.  States that she was treated with Risperdal and trazodone but does not remember whether they were effective.  Again in 2003 she was restarted on medication for depression, this time with Zoloft.  She does not believe it was effective.  She has done therapy on multiple occasions, last in 2015.  Again, has not derived much benefit from this.  She also has seen several psychiatrists but it has been quite sometime since her last assessment.  For the past 9 months, she has noticed worsening of her depression.  States that she is having trouble taking care of herself.  Sometimes will wear the same clothes for 2 to 4 weeks and she will go to 2 weeks without showering.  She no longer go shopping and gets all of her food ordered to her house.  She reports that she is more inactive and has been eating more.  She also reports crying more easily, feeling excessively tired, and also having difficulty with sleep.  She has been trying to change some of these behaviors on her own.  She has been going to the gym recently and she just got a new job which she would very much like to keep.  She states that her 2 most recent jobs in January and February she was unable to keep because she called out too many times related to her mental health.  At this point, she is interested in restarting medication.  She is not sure about seeing a therapist.  We also discussed seeing a psychiatrist again but did not get a clear answer about whether she would be willing to do so.  She denies suicidal ideation.

## 2023-05-19 NOTE — ASSESSMENT & PLAN NOTE
She reports that in the past 6 to 9 months she has developed worsening anxiety which typically was not a problem for her previously.  She is getting very anxious in social situations and finding herself performing repetitive behaviors which are bothersome to her but she does not feel that she can control such as touching her face, wringing her hands.  She also can start to feel sweaty and uncomfortable like she needs to retreat to a private place such as the bathroom.  She is concerned about the symptoms with her upcoming job.  She has been isolating quite a bit at home.  Has not been in place since February.  Additionally, is reporting some difficulty with sleep.

## 2023-05-19 NOTE — PROGRESS NOTES
Subjective:   Ernesto Ramos is a 42 y.o. female here today for worsening depression and anxiety    Severe episode of recurrent major depressive disorder, without psychotic features (HCC)  She reports initially being diagnosed with depression when she was 10 years old and has been dealing with it off and on throughout her life.  At age 17 and again at age 19, she reports suicide attempts for which she was hospitalized for 2 weeks.  States that she was treated with Risperdal and trazodone but does not remember whether they were effective.  Again in 2003 she was restarted on medication for depression, this time with Zoloft.  She does not believe it was effective.  She has done therapy on multiple occasions, last in 2015.  Again, has not derived much benefit from this.  She also has seen several psychiatrists but it has been quite sometime since her last assessment.  For the past 9 months, she has noticed worsening of her depression.  States that she is having trouble taking care of herself.  Sometimes will wear the same clothes for 2 to 4 weeks and she will go to 2 weeks without showering.  She no longer go shopping and gets all of her food ordered to her house.  She reports that she is more inactive and has been eating more.  She also reports crying more easily, feeling excessively tired, and also having difficulty with sleep.  She has been trying to change some of these behaviors on her own.  She has been going to the gym recently and she just got a new job which she would very much like to keep.  She states that her 2 most recent jobs in January and February she was unable to keep because she called out too many times related to her mental health.  At this point, she is interested in restarting medication.  She is not sure about seeing a therapist.  We also discussed seeing a psychiatrist again but did not get a clear answer about whether she would be willing to do so.  She denies suicidal  ideation.    Anxiety  She reports that in the past 6 to 9 months she has developed worsening anxiety which typically was not a problem for her previously.  She is getting very anxious in social situations and finding herself performing repetitive behaviors which are bothersome to her but she does not feel that she can control such as touching her face, wringing her hands.  She also can start to feel sweaty and uncomfortable like she needs to retreat to a private place such as the bathroom.  She is concerned about the symptoms with her upcoming job.  She has been isolating quite a bit at home.  Has not been in place since February.  Additionally, is reporting some difficulty with sleep.       Current medicines (including changes today)  Current Outpatient Medications   Medication Sig Dispense Refill    escitalopram (LEXAPRO) 10 MG Tab Take 1/2 tab daily for 1 week then increase to 1 tab daily 30 Tablet 2    hydrOXYzine HCl (ATARAX) 25 MG Tab Take 0.5-1 Tablets by mouth 3 times a day as needed for Anxiety (insomnia). 30 Tablet 2    ASHWAGANDHA PO Take  by mouth.      QVAR REDIHALER 80 MCG/ACT inhaler Inhale 2 Puffs 2 times a day. 10.6 g 5    levothyroxine (SYNTHROID) 50 MCG Tab Take 1 tablet by mouth every morning on an empty stomach. 30 Tablet 0    polyethylene glycol 3350 (MIRALAX) 17 GM/SCOOP Powder Mix 17 g (1 capful) with liquid and take by mouth every day. 510 g 3    Sennosides (SENNA) 8.6 MG Tab Take 1-2 tablets by mouth at bedtime as needed (constipation). 30 Tablet 0    triamcinolone acetonide (KENALOG) 0.1 % Cream Apply thin layer to rash on neck twice daily as needed 30 g 1     No current facility-administered medications for this visit.     She  has a past medical history of Allergy, Anemia (2018), Anesthesia (07/28/2022), Asthma (07/28/2022), Bowel habit changes (07/28/2022), Breath shortness (07/28/2022), Chronic sinusitis, Disorder of thyroid (07/28/2022), GERD (gastroesophageal reflux disease), Head  ache (07/28/2022), Heart burn (07/28/2022), Hydradenitis, Hydradenitis, Obesity, Psychiatric problem (07/28/2022), and Snoring (07/28/2022).         Objective:     Vitals:    05/18/23 1553   BP: (!) 140/90   Pulse: 96   Resp: 16   Temp: 36.7 °C (98.1 °F)   SpO2: 94%     Body mass index is 60.25 kg/m².   Physical Exam:  Constitutional: Alert, no distress.  Skin: Warm, dry, good turgor, no rashes in visible areas.  Eye: Equal, round and reactive, conjunctiva clear, lids normal.  Psych: Alert and oriented x3, flat affect, depressed mood      Assessment and Plan:   The following treatment plan was discussed    1. Anxiety  Uncontrolled.  We discussed first-line therapy of an SSRI.  We will avoid Zoloft as she does not believe this was helpful in the past.  We will start Lexapro with hydroxyzine as needed for panic attacks/insomnia.  We will have her follow-up in 6 weeks.  - escitalopram (LEXAPRO) 10 MG Tab; Take 1/2 tab daily for 1 week then increase to 1 tab daily  Dispense: 30 Tablet; Refill: 2  - hydrOXYzine HCl (ATARAX) 25 MG Tab; Take 0.5-1 Tablets by mouth 3 times a day as needed for Anxiety (insomnia).  Dispense: 30 Tablet; Refill: 2    2. Severe episode of recurrent major depressive disorder, without psychotic features (HCC)  - escitalopram (LEXAPRO) 10 MG Tab; Take 1/2 tab daily for 1 week then increase to 1 tab daily  Dispense: 30 Tablet; Refill: 2      Total 42 minutes face-to-face time spent with patient, with greater than 50% of the total time discussing patient's issues and symptoms as listed above in assessment and plan, as well as managing coordination of care for future evaluation and treatment.    Followup: Return in about 6 weeks (around 6/29/2023) for depression, anxiety.

## 2023-05-25 ENCOUNTER — PATIENT MESSAGE (OUTPATIENT)
Dept: MEDICAL GROUP | Facility: MEDICAL CENTER | Age: 43
End: 2023-05-25
Payer: MEDICAID

## 2023-05-25 NOTE — PROGRESS NOTES
"S: Doing well overall. Continued pain and pressure today, but slightly improved. Still having blurry vision. Repeat head CT unchanged.     O:  BP (!) 140/89   Pulse 80   Temp 36.5 °C (97.7 °F) (Temporal)   Resp 18   Ht 1.753 m (5' 9\")   Wt (!) 182 kg (401 lb 0.3 oz)   SpO2 91%   Gen: interactive and appropriate  Pulm: Breathing comfortably on RA without stridor or stertor  Face: symmetric, no edema, facial strength intact bilaterally  Eyes: conjunctiva clear, no chemosis. EOMI  Nose: patent, no active drainage.   OC/OP: clear, no masses.   Neck: flat, no discrete masses  Neuro: cranial nerves grossly intact bilaterally. 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 Doing well overall, but still having pressure and some blurry vision. Repeat head CT yesterday unchanged. No e/o CSF leak. Continue saline spray.     Aggressive stool softeners and possible enema would be helpful to avoid straining    - skull base precautions: no nose blowing, sneeze with mouth open, no nasal manipulation, no positive pressure ventilation, avoid strenuous activity or heavy lifting for 4-6 weeks    Has follow up scheduled with me on Friday       Mary Pena M.D.    " Addended by: PRASANTH HO on: 5/25/2023 10:21 AM     Modules accepted: Orders

## 2023-05-26 ENCOUNTER — PATIENT MESSAGE (OUTPATIENT)
Dept: MEDICAL GROUP | Facility: MEDICAL CENTER | Age: 43
End: 2023-05-26
Payer: MEDICAID

## 2023-05-26 DIAGNOSIS — R06.02 SHORTNESS OF BREATH: ICD-10-CM

## 2023-05-26 RX ORDER — METHYLPREDNISOLONE 4 MG/1
TABLET ORAL
Qty: 21 TABLET | Refills: 0 | Status: SHIPPED | OUTPATIENT
Start: 2023-05-26 | End: 2023-06-30

## 2023-06-03 PROCEDURE — RXMED WILLOW AMBULATORY MEDICATION CHARGE: Performed by: INTERNAL MEDICINE

## 2023-06-06 ENCOUNTER — PHARMACY VISIT (OUTPATIENT)
Dept: PHARMACY | Facility: MEDICAL CENTER | Age: 43
End: 2023-06-06
Payer: COMMERCIAL

## 2023-06-30 ENCOUNTER — PHARMACY VISIT (OUTPATIENT)
Dept: PHARMACY | Facility: MEDICAL CENTER | Age: 43
End: 2023-06-30
Payer: COMMERCIAL

## 2023-06-30 ENCOUNTER — OFFICE VISIT (OUTPATIENT)
Dept: MEDICAL GROUP | Facility: MEDICAL CENTER | Age: 43
End: 2023-06-30
Attending: NURSE PRACTITIONER
Payer: MEDICAID

## 2023-06-30 VITALS
WEIGHT: 293 LBS | RESPIRATION RATE: 17 BRPM | BODY MASS INDEX: 43.4 KG/M2 | OXYGEN SATURATION: 99 % | HEIGHT: 69 IN | SYSTOLIC BLOOD PRESSURE: 102 MMHG | DIASTOLIC BLOOD PRESSURE: 80 MMHG | HEART RATE: 96 BPM | TEMPERATURE: 96 F

## 2023-06-30 DIAGNOSIS — R06.2 WHEEZING: ICD-10-CM

## 2023-06-30 DIAGNOSIS — F33.2 SEVERE EPISODE OF RECURRENT MAJOR DEPRESSIVE DISORDER, WITHOUT PSYCHOTIC FEATURES (HCC): ICD-10-CM

## 2023-06-30 DIAGNOSIS — L73.2 HIDRADENITIS: ICD-10-CM

## 2023-06-30 DIAGNOSIS — T78.40XD ALLERGY, SUBSEQUENT ENCOUNTER: ICD-10-CM

## 2023-06-30 DIAGNOSIS — F41.9 ANXIETY: ICD-10-CM

## 2023-06-30 DIAGNOSIS — F33.1 MODERATE EPISODE OF RECURRENT MAJOR DEPRESSIVE DISORDER (HCC): ICD-10-CM

## 2023-06-30 PROCEDURE — RXMED WILLOW AMBULATORY MEDICATION CHARGE: Performed by: NURSE PRACTITIONER

## 2023-06-30 PROCEDURE — 96372 THER/PROPH/DIAG INJ SC/IM: CPT | Performed by: NURSE PRACTITIONER

## 2023-06-30 PROCEDURE — 3074F SYST BP LT 130 MM HG: CPT | Performed by: NURSE PRACTITIONER

## 2023-06-30 PROCEDURE — 700111 HCHG RX REV CODE 636 W/ 250 OVERRIDE (IP): Mod: JZ,UD | Performed by: NURSE PRACTITIONER

## 2023-06-30 PROCEDURE — 99214 OFFICE O/P EST MOD 30 MIN: CPT | Performed by: NURSE PRACTITIONER

## 2023-06-30 PROCEDURE — 3079F DIAST BP 80-89 MM HG: CPT | Performed by: NURSE PRACTITIONER

## 2023-06-30 PROCEDURE — 99213 OFFICE O/P EST LOW 20 MIN: CPT | Mod: 25 | Performed by: NURSE PRACTITIONER

## 2023-06-30 RX ORDER — FLUTICASONE PROPIONATE 50 MCG
1 SPRAY, SUSPENSION (ML) NASAL DAILY
Qty: 15.8 ML | Refills: 2 | Status: SHIPPED | OUTPATIENT
Start: 2023-06-30 | End: 2023-11-07

## 2023-06-30 RX ORDER — TRIAMCINOLONE ACETONIDE 40 MG/ML
40 INJECTION, SUSPENSION INTRA-ARTICULAR; INTRAMUSCULAR ONCE
Status: COMPLETED | OUTPATIENT
Start: 2023-06-30 | End: 2023-06-30

## 2023-06-30 RX ORDER — MONTELUKAST SODIUM 10 MG/1
10 TABLET ORAL DAILY
Qty: 30 TABLET | Refills: 2 | Status: SHIPPED | OUTPATIENT
Start: 2023-06-30 | End: 2024-02-20 | Stop reason: SDUPTHER

## 2023-06-30 RX ORDER — CLINDAMYCIN PHOSPHATE 10 MG/G
1 GEL TOPICAL 2 TIMES DAILY
Qty: 60 G | Refills: 0 | Status: SHIPPED | OUTPATIENT
Start: 2023-06-30 | End: 2023-11-07

## 2023-06-30 RX ORDER — PREDNISONE 20 MG/1
20 TABLET ORAL DAILY
Qty: 5 TABLET | Refills: 0 | Status: SHIPPED | OUTPATIENT
Start: 2023-06-30 | End: 2023-07-05

## 2023-06-30 RX ORDER — HYDROXYZINE HYDROCHLORIDE 25 MG/1
12.5-25 TABLET, FILM COATED ORAL 3 TIMES DAILY PRN
Qty: 30 TABLET | Refills: 2 | Status: SHIPPED | OUTPATIENT
Start: 2023-06-30 | End: 2023-09-28

## 2023-06-30 RX ADMIN — TRIAMCINOLONE ACETONIDE 40 MG: 40 INJECTION, SUSPENSION INTRA-ARTICULAR; INTRAMUSCULAR at 16:36

## 2023-06-30 ASSESSMENT — PATIENT HEALTH QUESTIONNAIRE - PHQ9
5. POOR APPETITE OR OVEREATING: 3 - NEARLY EVERY DAY
CLINICAL INTERPRETATION OF PHQ2 SCORE: 6
SUM OF ALL RESPONSES TO PHQ QUESTIONS 1-9: 25

## 2023-06-30 ASSESSMENT — FIBROSIS 4 INDEX: FIB4 SCORE: 0.78

## 2023-07-03 PROCEDURE — RXMED WILLOW AMBULATORY MEDICATION CHARGE: Performed by: INTERNAL MEDICINE

## 2023-07-05 ENCOUNTER — PHARMACY VISIT (OUTPATIENT)
Dept: PHARMACY | Facility: MEDICAL CENTER | Age: 43
End: 2023-07-05
Payer: COMMERCIAL

## 2023-07-07 PROBLEM — T78.40XA ALLERGIES: Status: ACTIVE | Noted: 2023-07-07

## 2023-07-07 NOTE — ASSESSMENT & PLAN NOTE
Ongoing, chronic-  Referral to psychiatry and psychology placed with patient permission  Hydroxyzine refilled

## 2023-07-07 NOTE — PROGRESS NOTES
No chief complaint on file.      Subjective:     HPI:   Ernesto Ramos is a 42 y.o. female here to discuss the evaluation and management of:      Problem   Allergies    Patient here with increased nasal congestion this been going on for a few days.  Patient states that this happens pretty frequently to her and she thinks she may have a sinus infection.  Patient denies any fever, or sick contacts.     Anxiety    Patient continues to struggle with anxiety and states she is out of her hydroxyzine tablets.  States she does not want to go on any medication that may cause weight gain at this time.     Hidradenitis    Patient states that she has a history of hidradenitis and would like to see a dermatologist.  Patient is willing to go to [place] to see dermatology.        Severe Episode of Recurrent Major Depressive Disorder, Without Psychotic Features (Hcc)    Patient states that her depression is significantly uncontrolled and affecting her. She does not want to go on any medications that are going to cause her to gain any more weight. States that she has moved around quite a lot recently and had to get new jobs frequently after being fired secondary to effects from her mental health. Adamantly states she has not been suicidal since 2018. Is open to seeing psychiatrist and possibly therapy. State she feels isolated as she has never been able to make friends well and has no family as her parents have both passed and she is not on speaking terms with siblings that are older than she is.          ROS  See HPI     Allergies   Allergen Reactions    Bloodless Unspecified     Pt requesting bloodless protocol    Bokeelia Oil Hives and Rash     Other reaction(s): Rash, urticarial      Cat Hair Extract Itching    Kiwi Extract      Other reaction(s): Lip Swellen  Other reaction(s): Lip Swellen         Current medicines (including changes today)  Current Outpatient Medications   Medication Sig Dispense Refill    montelukast  (SINGULAIR) 10 MG Tab Take 1 Tablet by mouth every day. 30 Tablet 2    fluticasone (FLONASE) 50 MCG/ACT nasal spray Administer 1 Spray into affected nostril(S) every day. 15.8 mL 2    hydrOXYzine HCl (ATARAX) 25 MG Tab Take 0.5-1 Tablets by mouth 3 times a day as needed for Anxiety (insomnia). 30 Tablet 2    clindamycin (CLEOCIN T) 1 % Gel Apply 1 Application topically 2 times a day. 60 g 0    ASHWAGANDHA PO Take  by mouth.      QVAR REDIHALER 80 MCG/ACT inhaler Inhale 2 Puffs 2 times a day. 10.6 g 5    levothyroxine (SYNTHROID) 50 MCG Tab Take 1 tablet by mouth every morning on an empty stomach. 30 Tablet 0    polyethylene glycol 3350 (MIRALAX) 17 GM/SCOOP Powder Mix 17 g (1 capful) with liquid and take by mouth every day. 510 g 3    Sennosides (SENNA) 8.6 MG Tab Take 1-2 tablets by mouth at bedtime as needed (constipation). 30 Tablet 0    triamcinolone acetonide (KENALOG) 0.1 % Cream Apply thin layer to rash on neck twice daily as needed 30 g 1     No current facility-administered medications for this visit.       Social History     Tobacco Use    Smoking status: Never    Smokeless tobacco: Never   Vaping Use    Vaping Use: Never used   Substance Use Topics    Alcohol use: Yes     Comment: rarely    Drug use: Never       Patient Active Problem List    Diagnosis Date Noted    Allergies 07/07/2023    Anxiety 05/18/2023    Constipation 02/08/2023    Fatigue 02/08/2023    Rash 02/08/2023    Early menopause occurring in patient age younger than 45 years 10/06/2022    Secondary hypothyroidism 08/25/2022    History of pituitary macroadenoma (HCC) 08/11/2022    Moderate persistent asthma 06/30/2022    Eosinophilia 06/30/2022    Hidradenitis 06/09/2022    Environmental allergies 06/09/2022    Gastroesophageal reflux disease without esophagitis 06/09/2022    Shortness of breath 06/09/2022    Severe episode of recurrent major depressive disorder, without psychotic features (HCC) 06/09/2022    Primary hypertension 03/24/2022  "   Chronic sinusitis 03/09/2022    Pulmonary air trapping 03/09/2022    Iron deficiency anemia 03/09/2022    Morbid obesity (HCC) 08/12/2015       Family History   Problem Relation Age of Onset    Hypertension Mother     Diabetes Mother     Cancer Mother 40        thyroid    Stroke Mother     Hypertension Father     Breast Cancer Sister 48    Heart Disease Neg Hx           Objective:     /80 (BP Location: Left arm, Patient Position: Sitting, BP Cuff Size: Adult)   Pulse 96   Temp (!) 35.6 °C (96 °F) (Temporal)   Resp 17   Ht 1.753 m (5' 9\")   Wt (!) 188 kg (415 lb)   SpO2 99%  Body mass index is 61.28 kg/m².    Physical Exam:  Physical Exam  Vitals reviewed.   Constitutional:       General: She is awake.      Appearance: Normal appearance. She is well-developed. She is obese.   HENT:      Head: Normocephalic.   Eyes:      Conjunctiva/sclera: Conjunctivae normal.   Cardiovascular:      Rate and Rhythm: Normal rate and regular rhythm.      Heart sounds: Normal heart sounds.   Pulmonary:      Effort: Pulmonary effort is normal. No respiratory distress.      Breath sounds: Wheezing present.   Musculoskeletal:      Cervical back: Neck supple.   Skin:     General: Skin is warm and dry.   Neurological:      Mental Status: She is alert and oriented to person, place, and time.   Psychiatric:         Mood and Affect: Mood is depressed. Affect is tearful.         Behavior: Behavior is withdrawn. Behavior is cooperative.              Assessment and Plan:     The following treatment plan was discussed:    Problem List Items Addressed This Visit       Hidradenitis     Ongoing-   Ref to Dermatology placed  Cleocin T wash prescribed to help with outbreaks.            Relevant Medications    clindamycin (CLEOCIN T) 1 % Gel    Other Relevant Orders    Referral to Dermatology    Severe episode of recurrent major depressive disorder, without psychotic features (HCC)     Ongoing-   Patient has long standing history  Does not " want medications at this time  Referral to Psychiatry/ Psychology   Encouraged to present to the ER if there is any change in SI, she denies at this time.  Advised possibly reaching out to join a community group for interaction  Depression Screening    Little interest or pleasure in doing things?  3 - nearly every day   Feeling down, depressed , or hopeless? 3 - nearly every day   Trouble falling or staying asleep, or sleeping too much?  3 - nearly every day   Feeling tired or having little energy?  3 - nearly every day   Poor appetite or overeating?  3 - nearly every day   Feeling bad about yourself - or that you are a failure or have let yourself or your family down? 3 - nearly every day   Trouble concentrating on things, such as reading the newspaper or watching television? 3 - nearly every day   Moving or speaking so slowly that other people could have noticed.  Or the opposite - being so fidgety or restless that you have been moving around a lot more than usual?  3 - nearly every day   Thoughts that you would be better off dead, or of hurting yourself?  1 - several days   Patient Health Questionnaire Score: 25       If depressive symptoms identified deferred to follow up visit unless specifically addressed in assesment and plan.    Interpretation of PHQ-9 Total Score   Score Severity   1-4 No Depression   5-9 Mild Depression   10-14 Moderate Depression   15-19 Moderately Severe Depression   20-27 Severe Depression           Relevant Medications    hydrOXYzine HCl (ATARAX) 25 MG Tab    Anxiety     Ongoing, chronic-  Referral to psychiatry and psychology placed with patient permission  Hydroxyzine refilled         Relevant Medications    hydrOXYzine HCl (ATARAX) 25 MG Tab    Other Relevant Orders    Referral to Psychiatry    Referral to Psychology    Allergies     Ongoing,  Likely secondary to viral illness or allergies given that has only been going on for a few days with no other symptoms such as high fevers or  cough we will hold on antibiotics for the time being.  We will initiate on Singulair and Flonase to help with nasal congestion and rhinorrhea  Kenalog shot provided in office as patient states she does have pretty severe allergies that have been acting up  Refilled inhaler  Patient slightly wheezy on exam and states that whenever she has allergies her asthma does act up therefore we will do short course of steroids to help with wheezing.            Relevant Medications    montelukast (SINGULAIR) 10 MG Tab    fluticasone (FLONASE) 50 MCG/ACT nasal spray     Other Visit Diagnoses       Moderate episode of recurrent major depressive disorder (HCC)        Relevant Medications    hydrOXYzine HCl (ATARAX) 25 MG Tab    Other Relevant Orders    Referral to Psychiatry    Referral to Psychology    Wheezing                Any change or worsening of signs or symptoms, patient encouraged to follow-up or report to emergency room for further evaluation. Patient verbalizes understanding and agrees.    Follow-Up: Follow-up as needed      PLEASE NOTE: This dictation was created using voice recognition software. I have made every reasonable attempt to correct obvious errors, but I expect that there are errors of grammar and possibly content that I did not discover before finalizing the note.

## 2023-07-07 NOTE — ASSESSMENT & PLAN NOTE
Ongoing-   Patient has long standing history  Does not want medications at this time  Referral to Psychiatry/ Psychology   Encouraged to present to the ER if there is any change in SI, she denies at this time.  Advised possibly reaching out to join a community group for interaction  Depression Screening    Little interest or pleasure in doing things?  3 - nearly every day   Feeling down, depressed , or hopeless? 3 - nearly every day   Trouble falling or staying asleep, or sleeping too much?  3 - nearly every day   Feeling tired or having little energy?  3 - nearly every day   Poor appetite or overeating?  3 - nearly every day   Feeling bad about yourself - or that you are a failure or have let yourself or your family down? 3 - nearly every day   Trouble concentrating on things, such as reading the newspaper or watching television? 3 - nearly every day   Moving or speaking so slowly that other people could have noticed.  Or the opposite - being so fidgety or restless that you have been moving around a lot more than usual?  3 - nearly every day   Thoughts that you would be better off dead, or of hurting yourself?  1 - several days   Patient Health Questionnaire Score: 25       If depressive symptoms identified deferred to follow up visit unless specifically addressed in assesment and plan.    Interpretation of PHQ-9 Total Score   Score Severity   1-4 No Depression   5-9 Mild Depression   10-14 Moderate Depression   15-19 Moderately Severe Depression   20-27 Severe Depression

## 2023-07-07 NOTE — ASSESSMENT & PLAN NOTE
Ongoing,  Likely secondary to viral illness or allergies given that has only been going on for a few days with no other symptoms such as high fevers or cough we will hold on antibiotics for the time being.  We will initiate on Singulair and Flonase to help with nasal congestion and rhinorrhea  Kenalog shot provided in office as patient states she does have pretty severe allergies that have been acting up  Refilled inhaler  Patient slightly wheezy on exam and states that whenever she has allergies her asthma does act up therefore we will do short course of steroids to help with wheezing.

## 2023-08-07 ENCOUNTER — TELEPHONE (OUTPATIENT)
Dept: MEDICAL GROUP | Facility: MEDICAL CENTER | Age: 43
End: 2023-08-07
Payer: MEDICAID

## 2023-08-08 NOTE — TELEPHONE ENCOUNTER
FINAL PRIOR AUTHORIZATION STATUS:    1.  Name of Medication & Dose: QVAR REDIHALER 80 MCG/ACT inhaler       2. Prior Auth Status: approved scanned under .    3. Action Taken: Pharmacy Notified: N\A Patient Notified: yes

## 2023-09-04 PROCEDURE — RXMED WILLOW AMBULATORY MEDICATION CHARGE: Performed by: INTERNAL MEDICINE

## 2023-09-05 ENCOUNTER — PHARMACY VISIT (OUTPATIENT)
Dept: PHARMACY | Facility: MEDICAL CENTER | Age: 43
End: 2023-09-05
Payer: COMMERCIAL

## 2023-09-15 ENCOUNTER — OFFICE VISIT (OUTPATIENT)
Dept: MEDICAL GROUP | Facility: MEDICAL CENTER | Age: 43
End: 2023-09-15
Attending: NURSE PRACTITIONER
Payer: MEDICAID

## 2023-09-15 VITALS
DIASTOLIC BLOOD PRESSURE: 84 MMHG | RESPIRATION RATE: 17 BRPM | WEIGHT: 293 LBS | BODY MASS INDEX: 43.4 KG/M2 | HEART RATE: 99 BPM | HEIGHT: 69 IN | SYSTOLIC BLOOD PRESSURE: 124 MMHG | TEMPERATURE: 97 F | OXYGEN SATURATION: 100 %

## 2023-09-15 DIAGNOSIS — B49 FUNGAL INFECTION: ICD-10-CM

## 2023-09-15 PROCEDURE — 99212 OFFICE O/P EST SF 10 MIN: CPT | Performed by: NURSE PRACTITIONER

## 2023-09-15 PROCEDURE — 3079F DIAST BP 80-89 MM HG: CPT | Performed by: NURSE PRACTITIONER

## 2023-09-15 PROCEDURE — 3074F SYST BP LT 130 MM HG: CPT | Performed by: NURSE PRACTITIONER

## 2023-09-15 PROCEDURE — 99214 OFFICE O/P EST MOD 30 MIN: CPT | Performed by: NURSE PRACTITIONER

## 2023-09-15 RX ORDER — MICONAZOLE NITRATE 2 %
1 AEROSOL, SPRAY (GRAM) TOPICAL 2 TIMES DAILY
Qty: 150 G | Refills: 2 | Status: SHIPPED | OUTPATIENT
Start: 2023-09-15

## 2023-09-15 ASSESSMENT — FIBROSIS 4 INDEX: FIB4 SCORE: .79811826505878606

## 2023-09-18 PROBLEM — B49 FUNGAL INFECTION: Status: ACTIVE | Noted: 2023-09-18

## 2023-09-18 NOTE — PROGRESS NOTES
Chief Complaint   Patient presents with    Cyst       Subjective:     HPI:   Ernesto Ramos is a 43 y.o. female here to discuss the evaluation and management of:      Problem   Fungal Infection    Patient has noted fungal infection to her toenails and bottom of her feet.  Patient states she has trouble reaching her feet to put on any medication or to take care of her nails.  Patient states she has tried to go to small places and stuff but they will not do anything secondary to her current infection.         ROS  See HPI     Allergies   Allergen Reactions    Bloodless Unspecified     Pt requesting bloodless protocol    [place] Oil Hives and Rash     Other reaction(s): Rash, urticarial      Cat Hair Extract Itching    Kiwi Extract      Other reaction(s): Lip Swellen  Other reaction(s): Lip Swellen         Current medicines (including changes today)  Current Outpatient Medications   Medication Sig Dispense Refill    Miconazole Nitrate (LOTRIMIN AF) 2 % Aerosol Apply 1 Application topically 2 times a day. 150 g 2    montelukast (SINGULAIR) 10 MG Tab Take 1 Tablet by mouth every day. 30 Tablet 2    fluticasone (FLONASE) 50 MCG/ACT nasal spray Administer 1 Spray into affected nostril(S) every day. 15.8 mL 2    hydrOXYzine HCl (ATARAX) 25 MG Tab Take 0.5-1 Tablets by mouth 3 times a day as needed for Anxiety (insomnia). 30 Tablet 2    clindamycin (CLEOCIN T) 1 % Gel Apply 1 Application topically 2 times a day. 60 g 0    ASHWAGANDHA PO Take  by mouth.      QVAR REDIHALER 80 MCG/ACT inhaler Inhale 2 Puffs 2 times a day. 10.6 g 5    levothyroxine (SYNTHROID) 50 MCG Tab Take 1 tablet by mouth every morning on an empty stomach. 30 Tablet 0    polyethylene glycol 3350 (MIRALAX) 17 GM/SCOOP Powder Mix 17 g (1 capful) with liquid and take by mouth every day. 510 g 3    Sennosides (SENNA) 8.6 MG Tab Take 1-2 tablets by mouth at bedtime as needed (constipation). 30 Tablet 0    triamcinolone acetonide (KENALOG) 0.1 % Cream Apply  "thin layer to rash on neck twice daily as needed 30 g 1     No current facility-administered medications for this visit.       Social History     Tobacco Use    Smoking status: Never    Smokeless tobacco: Never   Vaping Use    Vaping Use: Never used   Substance Use Topics    Alcohol use: Yes     Comment: rarely    Drug use: Never       Patient Active Problem List    Diagnosis Date Noted    Fungal infection 09/18/2023    Allergies 07/07/2023    Anxiety 05/18/2023    Constipation 02/08/2023    Fatigue 02/08/2023    Rash 02/08/2023    Early menopause occurring in patient age younger than 45 years 10/06/2022    Secondary hypothyroidism 08/25/2022    History of pituitary macroadenoma (HCC) 08/11/2022    Moderate persistent asthma 06/30/2022    Eosinophilia 06/30/2022    Hidradenitis 06/09/2022    Environmental allergies 06/09/2022    Gastroesophageal reflux disease without esophagitis 06/09/2022    Shortness of breath 06/09/2022    Severe episode of recurrent major depressive disorder, without psychotic features (HCC) 06/09/2022    Primary hypertension 03/24/2022    Chronic sinusitis 03/09/2022    Pulmonary air trapping 03/09/2022    Iron deficiency anemia 03/09/2022    Morbid obesity (HCC) 08/12/2015       Family History   Problem Relation Age of Onset    Hypertension Mother     Diabetes Mother     Cancer Mother 40        thyroid    Stroke Mother     Hypertension Father     Breast Cancer Sister 48    Heart Disease Neg Hx           Objective:     /84 (BP Location: Right arm, Patient Position: Sitting, BP Cuff Size: Adult)   Pulse 99   Temp 36.1 °C (97 °F) (Temporal)   Resp 17   Ht 1.753 m (5' 9\")   Wt (!) 190 kg (418 lb 9.6 oz)   SpO2 100%  Body mass index is 61.82 kg/m².    Physical Exam:  Physical Exam  Vitals reviewed.   Constitutional:       General: She is awake.      Appearance: Normal appearance. She is well-developed. She is obese.   HENT:      Head: Normocephalic.   Eyes:      Conjunctiva/sclera: " Conjunctivae normal.   Cardiovascular:      Rate and Rhythm: Normal rate.   Pulmonary:      Effort: Pulmonary effort is normal. No respiratory distress.   Musculoskeletal:      Cervical back: Neck supple.   Feet:      Right foot:      Toenail Condition: Right toenails are abnormally thick. Fungal disease present.     Left foot:      Toenail Condition: Left toenails are abnormally thick. Fungal disease present.  Skin:     General: Skin is warm and dry.   Neurological:      Mental Status: She is alert and oriented to person, place, and time.   Psychiatric:         Mood and Affect: Mood normal.         Behavior: Behavior normal. Behavior is cooperative.              Assessment and Plan:     The following treatment plan was discussed:    Problem List Items Addressed This Visit       Fungal infection     Ongoing, acute-  Secondary to patient's inability to reach her feet we will trial aerosolized fungal medication with Lotrimin AF  Referral to podiatry to see if there is a way they can help with her fungal toenail infection as it currently causes her significant pain and forces her to wear different shoes because her toenails get caught on the inside of her shoes         Relevant Medications    Miconazole Nitrate (LOTRIMIN AF) 2 % Aerosol    Other Relevant Orders    Referral to Podiatry       Any change or worsening of signs or symptoms, patient encouraged to follow-up or report to emergency room for further evaluation. Patient verbalizes understanding and agrees.    Follow-Up: Follow-up as needed      PLEASE NOTE: This dictation was created using voice recognition software. I have made every reasonable attempt to correct obvious errors, but I expect that there are errors of grammar and possibly content that I did not discover before finalizing the note.

## 2023-09-18 NOTE — ASSESSMENT & PLAN NOTE
Ongoing, acute-  Secondary to patient's inability to reach her feet we will trial aerosolized fungal medication with Lotrimin AF  Referral to podiatry to see if there is a way they can help with her fungal toenail infection as it currently causes her significant pain and forces her to wear different shoes because her toenails get caught on the inside of her shoes

## 2023-09-27 ENCOUNTER — TELEMEDICINE (OUTPATIENT)
Dept: MEDICAL GROUP | Facility: MEDICAL CENTER | Age: 43
End: 2023-09-27
Attending: INTERNAL MEDICINE
Payer: MEDICAID

## 2023-09-27 VITALS — HEIGHT: 69 IN | RESPIRATION RATE: 16 BRPM | BODY MASS INDEX: 43.4 KG/M2 | WEIGHT: 293 LBS

## 2023-09-27 DIAGNOSIS — L73.2 HIDRADENITIS: ICD-10-CM

## 2023-09-27 DIAGNOSIS — J45.40 MODERATE PERSISTENT ASTHMA WITHOUT COMPLICATION: ICD-10-CM

## 2023-09-27 DIAGNOSIS — E03.8 SECONDARY HYPOTHYROIDISM: ICD-10-CM

## 2023-09-27 PROCEDURE — 99214 OFFICE O/P EST MOD 30 MIN: CPT | Performed by: INTERNAL MEDICINE

## 2023-09-27 ASSESSMENT — FIBROSIS 4 INDEX: FIB4 SCORE: .79811826505878606

## 2023-09-27 NOTE — ASSESSMENT & PLAN NOTE
Continues to report difficulty with breathing.  Is using her Qvar regularly.  Has not been taking the Singulair.  Does report some itchy watery eyes, sneezing, and runny nose.

## 2023-09-27 NOTE — ASSESSMENT & PLAN NOTE
Has been off of levothyroxine for multiple months, was previously taking 50 mcg.  Had a low T4 but normal TSH.  Is not currently following with endocrinology.

## 2023-09-27 NOTE — PROGRESS NOTES
Virtual Visit: Established Patient   This visit was conducted via Zoom using secure and encrypted videoconferencing technology.   The patient was in their home in the state of [place].    The patient's identity was confirmed and verbal consent was obtained for this virtual visit.     Subjective:   CC: cyst, breathing issues    Ernesto Ramos is a 43 y.o. female presenting for evaluation and management of:    Secondary hypothyroidism  Has been off of levothyroxine for multiple months, was previously taking 50 mcg.  Had a low T4 but normal TSH.  Is not currently following with endocrinology.    Moderate persistent asthma  Continues to report difficulty with breathing.  Is using her Qvar regularly.  Has not been taking the Singulair.  Does report some itchy watery eyes, sneezing, and runny nose.    Hidradenitis  States that she has a draining cyst on her left side.  She is not able to show me on camera today due to the location however she is willing to come into clinic tomorrow for evaluation.  States that she needs to change her sheets and close because of the leakage.  It has been present for about a month and is tender.  Drainage looks light pink in color per patient.  She notices 2 small holes where the fluid leaks out.  She was able to get a dermatology appointment for October 30.       Current medicines (including changes today)  Current Outpatient Medications   Medication Sig Dispense Refill    Miconazole Nitrate (LOTRIMIN AF) 2 % Aerosol Apply 1 Application topically 2 times a day. 150 g 2    montelukast (SINGULAIR) 10 MG Tab Take 1 Tablet by mouth every day. 30 Tablet 2    fluticasone (FLONASE) 50 MCG/ACT nasal spray Administer 1 Spray into affected nostril(S) every day. 15.8 mL 2    hydrOXYzine HCl (ATARAX) 25 MG Tab Take 0.5-1 Tablets by mouth 3 times a day as needed for Anxiety (insomnia). 30 Tablet 2    clindamycin (CLEOCIN T) 1 % Gel Apply 1 Application topically 2 times a day. 60 g 0    ASHWAGANDHA  "PO Take  by mouth.      QVAR REDIHALER 80 MCG/ACT inhaler Inhale 2 Puffs 2 times a day. 10.6 g 5    polyethylene glycol 3350 (MIRALAX) 17 GM/SCOOP Powder Mix 17 g (1 capful) with liquid and take by mouth every day. 510 g 3    Sennosides (SENNA) 8.6 MG Tab Take 1-2 tablets by mouth at bedtime as needed (constipation). 30 Tablet 0    triamcinolone acetonide (KENALOG) 0.1 % Cream Apply thin layer to rash on neck twice daily as needed 30 g 1     No current facility-administered medications for this visit.       Patient Active Problem List    Diagnosis Date Noted    Fungal infection 09/18/2023    Allergies 07/07/2023    Anxiety 05/18/2023    Constipation 02/08/2023    Fatigue 02/08/2023    Rash 02/08/2023    Early menopause occurring in patient age younger than 45 years 10/06/2022    Secondary hypothyroidism 08/25/2022    History of pituitary macroadenoma (HCC) 08/11/2022    Moderate persistent asthma 06/30/2022    Eosinophilia 06/30/2022    Hidradenitis 06/09/2022    Environmental allergies 06/09/2022    Gastroesophageal reflux disease without esophagitis 06/09/2022    Shortness of breath 06/09/2022    Severe episode of recurrent major depressive disorder, without psychotic features (HCC) 06/09/2022    Primary hypertension 03/24/2022    Chronic sinusitis 03/09/2022    Pulmonary air trapping 03/09/2022    Iron deficiency anemia 03/09/2022    Morbid obesity (HCC) 08/12/2015        Objective:   Resp 16   Ht 1.753 m (5' 9\")   Wt (!) 190 kg (418 lb)   BMI 61.73 kg/m²     Physical Exam:  Constitutional: Alert, no distress, well-groomed.  Skin: No rashes in visible areas.  Eye: Round. Conjunctiva clear, lids normal. No icterus.   ENMT: Lips pink without lesions, good dentition, moist mucous membranes. Phonation normal.  Neck: No masses, no thyromegaly. Moves freely without pain.  Respiratory: Unlabored respiratory effort, no cough or audible wheeze  Psych: Alert and oriented x3, normal affect and mood.     Assessment and " Plan:   The following treatment plan was discussed:     1. Secondary hypothyroidism  Off of medication currently.  Discussed obtaining updated labs  - TSH WITH REFLEX TO FT4; Future    2. Moderate persistent asthma without complication  Will evaluate with physical exam when patient comes into the office tomorrow.  In the meantime she will continue her Qvar.  Reviewed indication for Singulair, suspect it may be helpful for her given her allergies and advised her to start it.    3. Hidradenitis  She will follow-up in clinic tomorrow so that I can evaluate the draining cyst.  Dermatology visit planned for the end of October.    Follow-up: Return in about 1 day (around 9/28/2023).

## 2023-09-27 NOTE — ASSESSMENT & PLAN NOTE
States that she has a draining cyst on her left side.  She is not able to show me on camera today due to the location however she is willing to come into clinic tomorrow for evaluation.  States that she needs to change her sheets and close because of the leakage.  It has been present for about a month and is tender.  Drainage looks light pink in color per patient.  She notices 2 small holes where the fluid leaks out.  She was able to get a dermatology appointment for October 30.

## 2023-09-28 ENCOUNTER — OFFICE VISIT (OUTPATIENT)
Dept: MEDICAL GROUP | Facility: MEDICAL CENTER | Age: 43
End: 2023-09-28
Attending: INTERNAL MEDICINE
Payer: MEDICAID

## 2023-09-28 ENCOUNTER — PHARMACY VISIT (OUTPATIENT)
Dept: PHARMACY | Facility: MEDICAL CENTER | Age: 43
End: 2023-09-28
Payer: COMMERCIAL

## 2023-09-28 VITALS
HEART RATE: 114 BPM | DIASTOLIC BLOOD PRESSURE: 80 MMHG | RESPIRATION RATE: 16 BRPM | BODY MASS INDEX: 43.4 KG/M2 | OXYGEN SATURATION: 96 % | SYSTOLIC BLOOD PRESSURE: 130 MMHG | TEMPERATURE: 98.1 F | HEIGHT: 69 IN | WEIGHT: 293 LBS

## 2023-09-28 DIAGNOSIS — L02.91 ABSCESS: ICD-10-CM

## 2023-09-28 DIAGNOSIS — J45.40 MODERATE PERSISTENT ASTHMA WITHOUT COMPLICATION: ICD-10-CM

## 2023-09-28 DIAGNOSIS — F41.9 ANXIETY: ICD-10-CM

## 2023-09-28 DIAGNOSIS — F51.01 PRIMARY INSOMNIA: ICD-10-CM

## 2023-09-28 PROBLEM — R53.83 FATIGUE: Status: RESOLVED | Noted: 2023-02-08 | Resolved: 2023-09-28

## 2023-09-28 PROBLEM — R21 RASH: Status: RESOLVED | Noted: 2023-02-08 | Resolved: 2023-09-28

## 2023-09-28 PROCEDURE — 3075F SYST BP GE 130 - 139MM HG: CPT | Performed by: INTERNAL MEDICINE

## 2023-09-28 PROCEDURE — 700101 HCHG RX REV CODE 250: Mod: UD | Performed by: INTERNAL MEDICINE

## 2023-09-28 PROCEDURE — RXMED WILLOW AMBULATORY MEDICATION CHARGE: Performed by: INTERNAL MEDICINE

## 2023-09-28 PROCEDURE — 10060 I&D ABSCESS SIMPLE/SINGLE: CPT | Performed by: INTERNAL MEDICINE

## 2023-09-28 PROCEDURE — 3079F DIAST BP 80-89 MM HG: CPT | Performed by: INTERNAL MEDICINE

## 2023-09-28 PROCEDURE — 99214 OFFICE O/P EST MOD 30 MIN: CPT | Performed by: INTERNAL MEDICINE

## 2023-09-28 PROCEDURE — 99214 OFFICE O/P EST MOD 30 MIN: CPT | Mod: 25 | Performed by: INTERNAL MEDICINE

## 2023-09-28 RX ORDER — METHYLPREDNISOLONE 4 MG/1
TABLET ORAL
Qty: 21 TABLET | Refills: 0 | Status: SHIPPED | OUTPATIENT
Start: 2023-09-28 | End: 2023-11-07

## 2023-09-28 RX ORDER — ESCITALOPRAM OXALATE 10 MG/1
TABLET ORAL
Qty: 30 TABLET | Refills: 1 | Status: SHIPPED | OUTPATIENT
Start: 2023-09-28 | End: 2024-02-20 | Stop reason: SDUPTHER

## 2023-09-28 RX ORDER — LIDOCAINE HCL/EPINEPHRINE/PF 2%-1:200K
20 VIAL (ML) INJECTION ONCE
Status: COMPLETED | OUTPATIENT
Start: 2023-09-28 | End: 2023-09-28

## 2023-09-28 RX ORDER — TRAZODONE HYDROCHLORIDE 100 MG/1
50-100 TABLET ORAL NIGHTLY PRN
Qty: 30 TABLET | Refills: 1 | Status: SHIPPED | OUTPATIENT
Start: 2023-09-28 | End: 2024-03-21 | Stop reason: SDUPTHER

## 2023-09-28 RX ORDER — LORAZEPAM 0.5 MG/1
0.5 TABLET ORAL
Qty: 10 TABLET | Refills: 0 | Status: SHIPPED | OUTPATIENT
Start: 2023-09-28 | End: 2023-10-28

## 2023-09-28 RX ORDER — LIDOCAINE HCL/EPINEPHRINE/PF 2%-1:200K
10 VIAL (ML) INJECTION ONCE
Status: DISCONTINUED | OUTPATIENT
Start: 2023-09-28 | End: 2023-09-28

## 2023-09-28 RX ADMIN — LIDOCAINE HYDROCHLORIDE AND EPINEPHRINE 20 ML: 20; 5 INJECTION, SOLUTION EPIDURAL; INFILTRATION; INTRACAUDAL; PERINEURAL at 16:17

## 2023-09-28 ASSESSMENT — FIBROSIS 4 INDEX: FIB4 SCORE: .79811826505878606

## 2023-09-28 NOTE — ASSESSMENT & PLAN NOTE
"Reports significant difficulty with falling asleep.  In the past has tried trazodone.  Believes this was helpful but \"made my arms feel like lead\".  It was quite a while that she tried it and she is willing to retry.  "

## 2023-09-28 NOTE — ASSESSMENT & PLAN NOTE
Reports anxiety has been severe lately.  For over a year she has been struggling with interacting with people, leaving the home, and has had difficulty keeping a job.  Previously she has tried Risperdal (as a teenager, she does not remember much about this), sertraline (had some associated weight gain), hydroxyzine recently (states it was ineffective) and possibly other medications but she states she has never been on a benzodiazepine.  Had wanted to start her on Lexapro at last visit but do not believe she picked up the prescription.  She reports severe debilitating episodes of anxiety.  She had to sign some paperwork for her new job and states that it was stressful enough that it caused her to break out in hives which are now present on her exam.

## 2023-09-28 NOTE — PROGRESS NOTES
"Subjective:   Ernesto Ramos is a 43 y.o. female here today for painful cyst, anxiety, breathing issues    Anxiety  Reports anxiety has been severe lately.  For over a year she has been struggling with interacting with people, leaving the home, and has had difficulty keeping a job.  Previously she has tried Risperdal (as a teenager, she does not remember much about this), sertraline (had some associated weight gain), hydroxyzine recently (states it was ineffective) and possibly other medications but she states she has never been on a benzodiazepine.  Had wanted to start her on Lexapro at last visit but do not believe she picked up the prescription.  She reports severe debilitating episodes of anxiety.  She had to sign some paperwork for her new job and states that it was stressful enough that it caused her to break out in hives which are now present on her exam.    Abscess  She presents today with painful boil on her back which has been present for a few weeks.  She reports that it has been draining consistently and it is very painful.  Denies fevers, has been unable to visualize it.    Moderate persistent asthma  She reports increased shortness of breath with any exertion.  She is using her Qvar twice a day.  She has tried numerous other inhalers in the past with side effects.  Most recently was on Advair and states that it caused bone pain.  Albuterol causes racing heart which she does not tolerate.  Has seen pulmonology.  When symptoms flare she benefits from a Medrol Dosepak which she would like to have on hand given winter is coming    Primary insomnia  Reports significant difficulty with falling asleep.  In the past has tried trazodone.  Believes this was helpful but \"made my arms feel like lead\".  It was quite a while that she tried it and she is willing to retry.       Current medicines (including changes today)  Current Outpatient Medications   Medication Sig Dispense Refill    methylPREDNISolone " (MEDROL DOSEPAK) 4 MG Tablet Therapy Pack Take according to package instructions 21 Tablet 0    escitalopram (LEXAPRO) 10 MG Tab Take 1/2 tab daily for 1 week then increase to 1 tab daily 30 Tablet 1    traZODone (DESYREL) 100 MG Tab Take 0.5-1 Tablets by mouth at bedtime as needed for Sleep. 30 Tablet 1    LORazepam (ATIVAN) 0.5 MG Tab Take 1 Tablet by mouth 1 time a day as needed for Anxiety for up to 30 days. 10 Tablet 0    Miconazole Nitrate (LOTRIMIN AF) 2 % Aerosol Apply 1 Application topically 2 times a day. 150 g 2    montelukast (SINGULAIR) 10 MG Tab Take 1 Tablet by mouth every day. 30 Tablet 2    fluticasone (FLONASE) 50 MCG/ACT nasal spray Administer 1 Spray into affected nostril(S) every day. 15.8 mL 2    clindamycin (CLEOCIN T) 1 % Gel Apply 1 Application topically 2 times a day. 60 g 0    ASHWAGANDHA PO Take  by mouth.      QVAR REDIHALER 80 MCG/ACT inhaler Inhale 2 Puffs 2 times a day. 10.6 g 5    polyethylene glycol 3350 (MIRALAX) 17 GM/SCOOP Powder Mix 17 g (1 capful) with liquid and take by mouth every day. 510 g 3    Sennosides (SENNA) 8.6 MG Tab Take 1-2 tablets by mouth at bedtime as needed (constipation). 30 Tablet 0    triamcinolone acetonide (KENALOG) 0.1 % Cream Apply thin layer to rash on neck twice daily as needed 30 g 1     Current Facility-Administered Medications   Medication Dose Route Frequency Provider Last Rate Last Admin    lidocaine-EPINEPHrine 2%-1:265096 injection 10 mL  10 mL Injection Once Leticia Houser M.D.         She  has a past medical history of Allergy, Anemia (2018), Anesthesia (07/28/2022), Asthma (07/28/2022), Bowel habit changes (07/28/2022), Breath shortness (07/28/2022), Chronic sinusitis, Disorder of thyroid (07/28/2022), GERD (gastroesophageal reflux disease), Head ache (07/28/2022), Heart burn (07/28/2022), Hydradenitis, Hydradenitis, Obesity, Psychiatric problem (07/28/2022), and Snoring (07/28/2022).         Objective:     Vitals:    09/28/23 1441   BP:  130/80   Pulse: (!) 114   Resp: 16   Temp: 36.7 °C (98.1 °F)   SpO2: 96%     Body mass index is 61.43 kg/m².   Physical Exam:  Constitutional: Alert, no distress.  Skin: Warm, dry, good turgor, no rashes in visible areas, approx 2 cm fluctuant cyst over the left lateral back between skin folds.  Eye: Equal, round and reactive, conjunctiva clear, lids normal.  Respiratory: Unlabored respiratory effort, scattered bilateral wheezes   psych: Alert and oriented x3, flat affect, anxious appearing    Procedure note:   Skin benefit of incision and drainage procedure explained to patient and she verbally agreed to proceed.  Skin over the abscess described above was prepped with alcohol.  Approximately 0.5 cc of 1% Xylocaine with epinephrine was injected subcutaneously to achieve anesthesia.  A scalpel was then used to make about a 1 cm incision over the fluctuant area of the lesion.  Approximately 15 cc of purulent drainage was expressed.  Wound was irrigated with sterile saline and dressed with gauze and a Band-Aid.  Patient tolerated procedure well without complication.    Assessment and Plan:   The following treatment plan was discussed    1. Abscess  Incised and drained in clinic today.  There was no significant surrounding cellulitis, no additional antibiotic therapy warranted.  She will continue local wound care and contact us if signs of infection occur    2. Moderate persistent asthma without complication  She will continue with her Qvar as it is the only inhaler to date that she has not had side effects with.  Medrol Dosepak given to have on hand for acute flare  -Continue Qvar  - methylPREDNISolone (MEDROL DOSEPAK) 4 MG Tablet Therapy Pack; Take according to package instructions  Dispense: 21 Tablet; Refill: 0    3. Anxiety  Uncontrolled, with significant panic attacks lately.  We discussed starting Lexapro as below.  Discussed short-term low-dose benzodiazepine to be used as needed until Lexapro becomes effective  over the next 4 to 6 weeks.  We will follow-up after that.  Discussed that lorazepam is a controlled substance, will show up positive on her drug test, should not be used with alcohol or any additional illicit drugs, may cause drowsiness.  - escitalopram (LEXAPRO) 10 MG Tab; Take 1/2 tab daily for 1 week then increase to 1 tab daily  Dispense: 30 Tablet; Refill: 1  - LORazepam (ATIVAN) 0.5 MG Tab; Take 1 Tablet by mouth 1 time a day as needed for Anxiety for up to 30 days.  Dispense: 10 Tablet; Refill: 0    4. Primary insomnia  Uncontrolled.  We will try trazodone as below.  - traZODone (DESYREL) 100 MG Tab; Take 0.5-1 Tablets by mouth at bedtime as needed for Sleep.  Dispense: 30 Tablet; Refill: 1    Total 43 minutes face-to-face time spent with patient, with greater than 50% of the total time discussing patient's issues and symptoms as listed above in assessment and plan, as well as managing coordination of care for future evaluation and treatment.      Followup: Return in about 4 weeks (around 10/26/2023).

## 2023-09-28 NOTE — ASSESSMENT & PLAN NOTE
She reports increased shortness of breath with any exertion.  She is using her Qvar twice a day.  She has tried numerous other inhalers in the past with side effects.  Most recently was on Advair and states that it caused bone pain.  Albuterol causes racing heart which she does not tolerate.  Has seen pulmonology.  When symptoms flare she benefits from a Medrol Dosepak which she would like to have on hand given winter is coming

## 2023-09-28 NOTE — ASSESSMENT & PLAN NOTE
She presents today with painful boil on her back which has been present for a few weeks.  She reports that it has been draining consistently and it is very painful.  Denies fevers, has been unable to visualize it.

## 2023-09-28 NOTE — PATIENT INSTRUCTIONS
[facility] [address]  [place] [address]  Phone: [phone]

## 2023-10-09 ENCOUNTER — OFFICE VISIT (OUTPATIENT)
Dept: MEDICAL GROUP | Facility: MEDICAL CENTER | Age: 43
End: 2023-10-09
Attending: NURSE PRACTITIONER
Payer: MEDICAID

## 2023-10-09 ENCOUNTER — PHARMACY VISIT (OUTPATIENT)
Dept: PHARMACY | Facility: MEDICAL CENTER | Age: 43
End: 2023-10-09
Payer: COMMERCIAL

## 2023-10-09 ENCOUNTER — HOSPITAL ENCOUNTER (OUTPATIENT)
Facility: MEDICAL CENTER | Age: 43
End: 2023-10-09
Attending: NURSE PRACTITIONER
Payer: MEDICAID

## 2023-10-09 VITALS
OXYGEN SATURATION: 98 % | HEART RATE: 100 BPM | WEIGHT: 293 LBS | SYSTOLIC BLOOD PRESSURE: 136 MMHG | DIASTOLIC BLOOD PRESSURE: 98 MMHG | RESPIRATION RATE: 16 BRPM | BODY MASS INDEX: 41.95 KG/M2 | TEMPERATURE: 97.1 F | HEIGHT: 70 IN

## 2023-10-09 DIAGNOSIS — R39.9 UTI SYMPTOMS: ICD-10-CM

## 2023-10-09 DIAGNOSIS — N12 PYELONEPHRITIS: ICD-10-CM

## 2023-10-09 DIAGNOSIS — L02.91 ABSCESS: ICD-10-CM

## 2023-10-09 DIAGNOSIS — L73.2 HIDRADENITIS: ICD-10-CM

## 2023-10-09 LAB
AMBIGUOUS DTTM AMBI4: NORMAL
APPEARANCE UR: CLEAR
BILIRUB UR STRIP-MCNC: NORMAL MG/DL
COLOR UR AUTO: NORMAL
GLUCOSE UR STRIP.AUTO-MCNC: NORMAL MG/DL
KETONES UR STRIP.AUTO-MCNC: NORMAL MG/DL
LEUKOCYTE ESTERASE UR QL STRIP.AUTO: NORMAL
NITRITE UR QL STRIP.AUTO: NORMAL
PH UR STRIP.AUTO: 6.5 [PH] (ref 5–8)
PROT UR QL STRIP: NORMAL MG/DL
RBC UR QL AUTO: NORMAL
SP GR UR STRIP.AUTO: 1.01
UROBILINOGEN UR STRIP-MCNC: 1 MG/DL

## 2023-10-09 PROCEDURE — 81002 URINALYSIS NONAUTO W/O SCOPE: CPT | Performed by: NURSE PRACTITIONER

## 2023-10-09 PROCEDURE — 87077 CULTURE AEROBIC IDENTIFY: CPT

## 2023-10-09 PROCEDURE — 87086 URINE CULTURE/COLONY COUNT: CPT

## 2023-10-09 PROCEDURE — 3080F DIAST BP >= 90 MM HG: CPT | Performed by: NURSE PRACTITIONER

## 2023-10-09 PROCEDURE — 99213 OFFICE O/P EST LOW 20 MIN: CPT | Performed by: NURSE PRACTITIONER

## 2023-10-09 PROCEDURE — 3075F SYST BP GE 130 - 139MM HG: CPT | Performed by: NURSE PRACTITIONER

## 2023-10-09 PROCEDURE — 99214 OFFICE O/P EST MOD 30 MIN: CPT | Performed by: NURSE PRACTITIONER

## 2023-10-09 PROCEDURE — RXMED WILLOW AMBULATORY MEDICATION CHARGE: Performed by: NURSE PRACTITIONER

## 2023-10-09 PROCEDURE — 87186 SC STD MICRODIL/AGAR DIL: CPT

## 2023-10-09 RX ORDER — CLINDAMYCIN PHOSPHATE 11.9 MG/ML
1 SOLUTION TOPICAL 2 TIMES DAILY
Qty: 60 ML | Refills: 2 | Status: SHIPPED | OUTPATIENT
Start: 2023-10-09 | End: 2023-11-07

## 2023-10-09 RX ORDER — CIPROFLOXACIN 500 MG/1
500 TABLET, FILM COATED ORAL 2 TIMES DAILY
Qty: 14 TABLET | Refills: 0 | Status: SHIPPED | OUTPATIENT
Start: 2023-10-09 | End: 2023-10-16

## 2023-10-09 ASSESSMENT — FIBROSIS 4 INDEX: FIB4 SCORE: .79811826505878606

## 2023-10-10 ENCOUNTER — TELEPHONE (OUTPATIENT)
Dept: HEALTH INFORMATION MANAGEMENT | Facility: OTHER | Age: 43
End: 2023-10-10
Payer: MEDICAID

## 2023-10-11 LAB
BACTERIA UR CULT: ABNORMAL
BACTERIA UR CULT: ABNORMAL
SIGNIFICANT IND 70042: ABNORMAL
SITE SITE: ABNORMAL
SOURCE SOURCE: ABNORMAL

## 2023-10-16 ENCOUNTER — NON-PROVIDER VISIT (OUTPATIENT)
Dept: WOUND CARE | Facility: MEDICAL CENTER | Age: 43
End: 2023-10-16
Attending: NURSE PRACTITIONER
Payer: MEDICAID

## 2023-10-16 PROCEDURE — RXMED WILLOW AMBULATORY MEDICATION CHARGE: Performed by: INTERNAL MEDICINE

## 2023-10-16 NOTE — PROGRESS NOTES
Patient called to late cancel wound care appointment, she needed to re-schedule. Patient re-scheduled to 10/23/23 at 0800.

## 2023-10-17 PROBLEM — N12 PYELONEPHRITIS: Status: ACTIVE | Noted: 2023-10-17

## 2023-10-17 NOTE — PROGRESS NOTES
Chief Complaint   Patient presents with    Cyst    UTI       Subjective:     HPI:   Ernesto Ramos is a 43 y.o. female here to discuss the evaluation and management of:      Problem   Pyelonephritis    Patient here with complaints of bilateral flank pain and discomfort with urination. States it has been going on for about 2 weeks with irritation, increased urination and some blood in the toilet. She was unsure if the blood in toilet was from one of her hydradenitis cysts bursting.      Abscess    She is here for follow up on an I&D that was completed at her last appointment on her left back. She states that it is continuing to drain. No fevers, no chills and drainage is still clear.          ROS  See HPI     Allergies   Allergen Reactions    Bloodless Unspecified     Pt requesting bloodless protocol    [place] Oil Hives and Rash     Other reaction(s): Rash, urticarial      Cat Hair Extract Itching    Kiwi Extract      Other reaction(s): Lip Swellen  Other reaction(s): Lip Swellen         Current medicines (including changes today)  Current Outpatient Medications   Medication Sig Dispense Refill    clindamycin (CLEOCIN) 1 % Solution Apply 1 Application topically 2 times a day. 60 mL 2    methylPREDNISolone (MEDROL DOSEPAK) 4 MG Tablet Therapy Pack Take according to package instructions 21 Tablet 0    escitalopram (LEXAPRO) 10 MG Tab Take 1/2 tab daily for 1 week then increase to 1 tab daily 30 Tablet 1    traZODone (DESYREL) 100 MG Tab Take 0.5-1 Tablets by mouth at bedtime as needed for Sleep. 30 Tablet 1    LORazepam (ATIVAN) 0.5 MG Tab Take 1 Tablet by mouth 1 time a day as needed for Anxiety for up to 30 days. 10 Tablet 0    Miconazole Nitrate (LOTRIMIN AF) 2 % Aerosol Apply 1 Application topically 2 times a day. 150 g 2    montelukast (SINGULAIR) 10 MG Tab Take 1 Tablet by mouth every day. 30 Tablet 2    fluticasone (FLONASE) 50 MCG/ACT nasal spray Administer 1 Spray into affected nostril(S) every day. 15.8  mL 2    clindamycin (CLEOCIN T) 1 % Gel Apply 1 Application topically 2 times a day. 60 g 0    ASHWAGANDHA PO Take  by mouth.      QVAR REDIHALER 80 MCG/ACT inhaler Inhale 2 Puffs 2 times a day. 10.6 g 5    polyethylene glycol 3350 (MIRALAX) 17 GM/SCOOP Powder Mix 17 g (1 capful) with liquid and take by mouth every day. 510 g 3    Sennosides (SENNA) 8.6 MG Tab Take 1-2 tablets by mouth at bedtime as needed (constipation). 30 Tablet 0    triamcinolone acetonide (KENALOG) 0.1 % Cream Apply thin layer to rash on neck twice daily as needed 30 g 1     No current facility-administered medications for this visit.       Social History     Tobacco Use    Smoking status: Never    Smokeless tobacco: Never   Vaping Use    Vaping Use: Never used   Substance Use Topics    Alcohol use: Yes     Comment: rarely    Drug use: Never       Patient Active Problem List    Diagnosis Date Noted    Pyelonephritis 10/17/2023    Abscess 09/28/2023    Primary insomnia 09/28/2023    Fungal infection 09/18/2023    Allergies 07/07/2023    Anxiety 05/18/2023    Constipation 02/08/2023    Early menopause occurring in patient age younger than 45 years 10/06/2022    Secondary hypothyroidism 08/25/2022    History of pituitary macroadenoma (HCC) 08/11/2022    Moderate persistent asthma 06/30/2022    Eosinophilia 06/30/2022    Hidradenitis 06/09/2022    Environmental allergies 06/09/2022    Gastroesophageal reflux disease without esophagitis 06/09/2022    Shortness of breath 06/09/2022    Severe episode of recurrent major depressive disorder, without psychotic features (HCC) 06/09/2022    Primary hypertension 03/24/2022    Chronic sinusitis 03/09/2022    Pulmonary air trapping 03/09/2022    Iron deficiency anemia 03/09/2022    Morbid obesity (HCC) 08/12/2015       Family History   Problem Relation Age of Onset    Hypertension Mother     Diabetes Mother     Cancer Mother 40        thyroid    Stroke Mother     Hypertension Father     Breast Cancer Sister  "48    Heart Disease Neg Hx           Objective:     BP (!) 136/98 (BP Location: Left arm, Patient Position: Sitting, BP Cuff Size: Adult)   Pulse 100   Temp 36.2 °C (97.1 °F) (Temporal)   Resp 16   Ht 1.778 m (5' 10\")   Wt (!) 190 kg (418 lb)   SpO2 98%  Body mass index is 59.98 kg/m².    Physical Exam:  Physical Exam  Vitals reviewed.   Constitutional:       General: She is awake.      Appearance: Normal appearance. She is well-developed. She is obese.   HENT:      Head: Normocephalic.   Eyes:      Conjunctiva/sclera: Conjunctivae normal.   Cardiovascular:      Rate and Rhythm: Normal rate and regular rhythm.      Heart sounds: Normal heart sounds.   Pulmonary:      Effort: Pulmonary effort is normal. No respiratory distress.      Breath sounds: Normal breath sounds. No wheezing.   Abdominal:      Tenderness: There is abdominal tenderness. There is right CVA tenderness and left CVA tenderness.   Musculoskeletal:      Cervical back: Neck supple.   Skin:     General: Skin is warm and dry.             Comments: Incision draining clear fluid    Neurological:      Mental Status: She is alert and oriented to person, place, and time.   Psychiatric:         Mood and Affect: Mood normal.         Behavior: Behavior normal. Behavior is cooperative.              Assessment and Plan:     The following treatment plan was discussed:    Problem List Items Addressed This Visit       Hidradenitis    Relevant Orders    Referral to Wound Clinic    Abscess     Ongoing-   Patient has been using napkins in her folds to collect drainage.   Drainage remains clear and no signs of purulence   Provided dressing supplies  Incision is draining appropriately with no signs of infection          Pyelonephritis     Ongoing- Acute-   POCT UA in clinic revealed:   Latest Reference Range & Units 10/09/23 09:00   POC Color Negative  dark yellow   POC Appearance Negative  clear   POC Specific Gravity <1.005 - >1.030  1.015   POC Urine PH 5.0 - 8.0 "  6.5   POC Glucose Negative mg/dL neg   POC Ketones Negative mg/dL neg   POC Protein Negative mg/dL neg   POC Nitrites Negative  neg   POC Leukocyte Esterase Negative  moderate.   POC Blood Negative  trace-intact   POC Bilirubin Negative mg/dL neg   POC Urobiligen Negative (0.2) mg/dL 1.0    Will send urine for culture  Given symptomatic with acute flank pain will treat with Cipro for possible pyelonephritis.   Encouraged her to drink extra water.   Follow up as needed          Relevant Medications    clindamycin (CLEOCIN) 1 % Solution    Other Relevant Orders    URINE CULTURE(NEW) (Completed)     Other Visit Diagnoses       UTI symptoms        Relevant Medications    clindamycin (CLEOCIN) 1 % Solution    Other Relevant Orders    POCT Urinalysis (Completed)    URINE CULTURE(NEW) (Completed)            Any change or worsening of signs or symptoms, patient encouraged to follow-up or report to emergency room for further evaluation. Patient verbalizes understanding and agrees.    Follow-Up: Follow up as needed       PLEASE NOTE: This dictation was created using voice recognition software. I have made every reasonable attempt to correct obvious errors, but I expect that there are errors of grammar and possibly content that I did not discover before finalizing the note.

## 2023-10-17 NOTE — ASSESSMENT & PLAN NOTE
Ongoing- Acute-   POCT UA in clinic revealed:   Latest Reference Range & Units 10/09/23 09:00   POC Color Negative  dark yellow   POC Appearance Negative  clear   POC Specific Gravity <1.005 - >1.030  1.015   POC Urine PH 5.0 - 8.0  6.5   POC Glucose Negative mg/dL neg   POC Ketones Negative mg/dL neg   POC Protein Negative mg/dL neg   POC Nitrites Negative  neg   POC Leukocyte Esterase Negative  moderate.   POC Blood Negative  trace-intact   POC Bilirubin Negative mg/dL neg   POC Urobiligen Negative (0.2) mg/dL 1.0    Will send urine for culture  Given symptomatic with acute flank pain will treat with Cipro for possible pyelonephritis.   Encouraged her to drink extra water.   Follow up as needed

## 2023-10-17 NOTE — ASSESSMENT & PLAN NOTE
Ongoing-   Patient has been using napkins in her folds to collect drainage.   Drainage remains clear and no signs of purulence   Provided dressing supplies  Incision is draining appropriately with no signs of infection

## 2023-10-18 ENCOUNTER — PHARMACY VISIT (OUTPATIENT)
Dept: PHARMACY | Facility: MEDICAL CENTER | Age: 43
End: 2023-10-18
Payer: COMMERCIAL

## 2023-10-19 PROCEDURE — RXMED WILLOW AMBULATORY MEDICATION CHARGE: Performed by: PODIATRIST

## 2023-10-23 ENCOUNTER — NON-PROVIDER VISIT (OUTPATIENT)
Dept: WOUND CARE | Facility: MEDICAL CENTER | Age: 43
End: 2023-10-23
Attending: NURSE PRACTITIONER
Payer: MEDICAID

## 2023-10-23 DIAGNOSIS — L73.2 HIDRADENITIS SUPPURATIVA OF MULTIPLE SITES: ICD-10-CM

## 2023-10-23 PROCEDURE — 99212 OFFICE O/P EST SF 10 MIN: CPT | Performed by: STUDENT IN AN ORGANIZED HEALTH CARE EDUCATION/TRAINING PROGRAM

## 2023-10-23 PROCEDURE — RXMED WILLOW AMBULATORY MEDICATION CHARGE: Performed by: STUDENT IN AN ORGANIZED HEALTH CARE EDUCATION/TRAINING PROGRAM

## 2023-10-23 PROCEDURE — 99214 OFFICE O/P EST MOD 30 MIN: CPT

## 2023-10-23 NOTE — CERTIFICATION
Non Provider Encounter- Full Thickness wound    HISTORY OF PRESENT ILLNESS  Wound History:    START OF CARE IN CLINIC: 10/23/23    REFERRING PROVIDER: MARAL Mcmahon   WOUND- Full Thickness Wound   LOCATION: left back flank; right buttocks   HISTORY: 43 year old female presents with wounds to her left back flank and right buttocks which she reports started in August 2023 or even longer time ago. Patient has had Hidradenitis suppurativa since she was 10. Patient previously had an excision of right axilla and has not had any difficulties in that area since. Patient reports when she lived in [place] in 2018, her dermatologist there prescribed her silvadene cream that seemed to help heal her other buttocks, groin & pubis mons sites the best. Patient has an appointment to establish care with dermatology in [place] on 10/30/23. On 9/28/23, patient's left flank wound site abscess was drained and patient had been covering the area with a neosporin dressing; however the last few day she has just been leaving it open to air after cleaning it with a hot shower & peroxide pump foam. Patient has also tried beeswax she bought online after watching [employer] videos. Patient's groin & mons pubis remains without open areas.   Patient plans to  her prescription for silvadene cream to apply to her buttocks.    Pertinent Labs and Diagnostics:    Labs:     A1c:   Lab Results   Component Value Date/Time    HBA1C 6.0 (H) 06/22/2022 09:59 AM          IMAGING: none    VASCULAR STUDIES: none    LAST  WOUND CULTURE:  DATE : none             Patient allergies and medications reviewed via Epic.     Wound Assessment:   Wound 10/23/23 Back;Flank Left (Active)   Wound Image   10/23/23 0800   Site Assessment Red 10/23/23 0800   Periwound Assessment Scar tissue 10/23/23 0800   Margins Defined edges;Epibole (rolled edges) 10/23/23 0800   Closure Open to air;Secondary intention 10/23/23 0800   Drainage Amount Small 10/23/23 0800    Drainage Description Serosanguineous 10/23/23 0800   Treatments Cleansed 10/23/23 0800   Wound Cleansing Hypochlorus Acid 10/23/23 0800   Periwound Protectant Skin Protectant Wipes to Periwound 10/23/23 0800   Dressing Status Open to Air 10/23/23 0800   Dressing Changed New 10/23/23 0800   Dressing Cleansing/Solutions Not Applicable 10/23/23 0800   Dressing Options Hydrofiber Silver;Nonadhesive Foam;Transparent Film;Hypafix Tape 10/23/23 0800   Dressing Change/Treatment Frequency Weekly, and As Needed 10/23/23 0800   Wound Team Following Weekly 10/23/23 0800   Wound Length (cm) 0.5 cm 10/23/23 0800   Wound Width (cm) 1.4 cm 10/23/23 0800   Wound Depth (cm) 0.1 cm 10/23/23 0800   Wound Surface Area (cm^2) 0.7 cm^2 10/23/23 0800   Wound Volume (cm^3) 0.07 cm^3 10/23/23 0800   Post-Procedure Length (cm) 0.5 cm 10/23/23 0800   Post-Procedure Width (cm) 1.4 cm 10/23/23 0800   Post-Procedure Depth (cm) 0.1 cm 10/23/23 0800   Post-Procedure Surface Area (cm^2) 0.7 cm^2 10/23/23 0800   Post-Procedure Volume (cm^3) 0.07 cm^3 10/23/23 0800   Tunneling (cm) 0 cm 10/23/23 0800   Undermining (cm) 0 cm 10/23/23 0800   Wound Odor None 10/23/23 0800       Wound 10/23/23 Buttocks Right (Active)   Wound Image   10/23/23 0800   Site Assessment Red;Yellow 10/23/23 0800   Periwound Assessment Scar tissue 10/23/23 0800   Margins Defined edges 10/23/23 0800   Closure Open to air 10/23/23 0800   Drainage Amount Scant 10/23/23 0800   Drainage Description Serosanguineous;Sanguineous 10/23/23 0800   Treatments Cleansed 10/23/23 0800   Wound Cleansing Normal Saline Irrigation 10/23/23 0800   Dressing Status Old drainage 10/23/23 0800   Dressing Changed Changed 10/23/23 0800   Dressing Cleansing/Solutions Not Applicable 10/23/23 0800   Dressing Options Triad Hydro;Dry Gauze 10/23/23 0800   Dressing Change/Treatment Frequency Daily, and As Needed 10/23/23 0800   Wound Team Following Weekly 10/23/23 0800   Wound Length (cm) 0.4 cm 10/23/23 0800    Wound Width (cm) 1 cm 10/23/23 0800   Wound Depth (cm) 0.1 cm 10/23/23 0800   Wound Surface Area (cm^2) 0.4 cm^2 10/23/23 0800   Wound Volume (cm^3) 0.04 cm^3 10/23/23 0800   Post-Procedure Length (cm) 0.4 cm 10/23/23 0800   Post-Procedure Width (cm) 1 cm 10/23/23 0800   Post-Procedure Depth (cm) 0.1 cm 10/23/23 0800   Post-Procedure Surface Area (cm^2) 0.4 cm^2 10/23/23 0800   Post-Procedure Volume (cm^3) 0.04 cm^3 10/23/23 0800   Tunneling (cm) 0 cm 10/23/23 0800   Undermining (cm) 0 cm 10/23/23 0800   Wound Odor None 10/23/23 0800       PATIENT EDUCATION  Should you experience any significant changes in your wound(s) such as infection (redness, swelling, localized heat, increased pain, fever >101 F, chills) or have any questions regarding your home care instructions, please contact the wound center [phone]. If after hours, contact your primary care physician or go the hospital emergency room.  Keep dressing clean and dry and cover while bathing on your back. Only change dressing if over saturated, soiled or its falling off to your back. Apply triad cream and gauze daily to your buttocks wound; once you acquire your silvadene cream from your pharmacy, you can apply that instead. Do not use peroxide anymore.   Follow-up with your dermatologist.   -Advised to go to ER for any increased redness, swelling, drainage or odor, or if patient develops fever, chills, nausea or vomiting.  -Importance of adequate nutrition for wound healing  -Increase protein intake (unless contraindicated by renal status)

## 2023-10-23 NOTE — PROGRESS NOTES
Brief Wound Care Provider Note    Patient with long history of hydradenitis suppurativa, has had previous excisions of right axilla and flank in past. Now with wound gluteal cleft / buttocks. Patient has appointment upcoming with dermatology. She reports that silvadene has worked well for her in past. I will prescribe. We discussed that this is chronic condition and that wound care can help manage symptoms and drainage but her condition is unlikely to be cured by wound care alone. She was counseled for warning symptoms that should prompt her to proceed to ED including signs and symptoms of sepsis or worsening infection.    My total time spent caring for the patient on the day of the encounter was 10 minutes, reviewing history, assessment, counseling and education, and coordination of care.  This does not include time spent on separately billable procedures/tests.

## 2023-10-23 NOTE — PATIENT INSTRUCTIONS
Should you experience any significant changes in your wound(s) such as infection (redness, swelling, localized heat, increased pain, fever >101 F, chills) or have any questions regarding your home care instructions, please contact the wound center [phone]. If after hours, contact your primary care physician or go the hospital emergency room.  Keep dressing clean and dry and cover while bathing on your back. Only change dressing if over saturated, soiled or its falling off to your back. Apply triad cream and gauze daily to your buttocks wound; once you acquire your silvadene cream from your pharmacy, you can apply that instead. Do not use peroxide anymore.   Follow-up with your dermatologist.   -Advised to go to ER for any increased redness, swelling, drainage or odor, or if patient develops fever, chills, nausea or vomiting.  -Importance of adequate nutrition for wound healing  -Increase protein intake (unless contraindicated by renal status)

## 2023-10-26 ENCOUNTER — PHARMACY VISIT (OUTPATIENT)
Dept: PHARMACY | Facility: MEDICAL CENTER | Age: 43
End: 2023-10-26
Payer: COMMERCIAL

## 2023-10-26 ENCOUNTER — HOSPITAL ENCOUNTER (OUTPATIENT)
Dept: LAB | Facility: MEDICAL CENTER | Age: 43
End: 2023-10-26
Attending: INTERNAL MEDICINE
Payer: MEDICAID

## 2023-10-26 DIAGNOSIS — E03.8 SECONDARY HYPOTHYROIDISM: ICD-10-CM

## 2023-10-26 LAB — TSH SERPL DL<=0.005 MIU/L-ACNC: 2.7 UIU/ML (ref 0.38–5.33)

## 2023-10-26 PROCEDURE — 36415 COLL VENOUS BLD VENIPUNCTURE: CPT

## 2023-10-26 PROCEDURE — 84443 ASSAY THYROID STIM HORMONE: CPT

## 2023-10-30 PROCEDURE — RXMED WILLOW AMBULATORY MEDICATION CHARGE: Performed by: DERMATOLOGY

## 2023-11-07 ENCOUNTER — OFFICE VISIT (OUTPATIENT)
Dept: MEDICAL GROUP | Facility: MEDICAL CENTER | Age: 43
End: 2023-11-07
Attending: INTERNAL MEDICINE
Payer: MEDICAID

## 2023-11-07 ENCOUNTER — PHARMACY VISIT (OUTPATIENT)
Dept: PHARMACY | Facility: MEDICAL CENTER | Age: 43
End: 2023-11-07
Payer: COMMERCIAL

## 2023-11-07 VITALS
HEART RATE: 84 BPM | OXYGEN SATURATION: 99 % | DIASTOLIC BLOOD PRESSURE: 88 MMHG | SYSTOLIC BLOOD PRESSURE: 130 MMHG | RESPIRATION RATE: 16 BRPM | TEMPERATURE: 98.1 F | WEIGHT: 293 LBS | HEIGHT: 70 IN | BODY MASS INDEX: 41.95 KG/M2

## 2023-11-07 DIAGNOSIS — R73.03 PREDIABETES: ICD-10-CM

## 2023-11-07 DIAGNOSIS — F41.9 ANXIETY: ICD-10-CM

## 2023-11-07 DIAGNOSIS — L73.2 HIDRADENITIS: ICD-10-CM

## 2023-11-07 PROBLEM — N12 PYELONEPHRITIS: Status: RESOLVED | Noted: 2023-10-17 | Resolved: 2023-11-07

## 2023-11-07 PROBLEM — L02.91 ABSCESS: Status: RESOLVED | Noted: 2023-09-28 | Resolved: 2023-11-07

## 2023-11-07 PROBLEM — E03.8 SECONDARY HYPOTHYROIDISM: Status: RESOLVED | Noted: 2022-08-25 | Resolved: 2023-11-07

## 2023-11-07 PROBLEM — B49 FUNGAL INFECTION: Status: RESOLVED | Noted: 2023-09-18 | Resolved: 2023-11-07

## 2023-11-07 LAB
HBA1C MFR BLD: 5.6 % (ref ?–5.8)
POCT INT CON NEG: NEGATIVE
POCT INT CON POS: POSITIVE

## 2023-11-07 PROCEDURE — 99214 OFFICE O/P EST MOD 30 MIN: CPT | Performed by: INTERNAL MEDICINE

## 2023-11-07 PROCEDURE — 83036 HEMOGLOBIN GLYCOSYLATED A1C: CPT | Performed by: INTERNAL MEDICINE

## 2023-11-07 PROCEDURE — 99213 OFFICE O/P EST LOW 20 MIN: CPT | Performed by: INTERNAL MEDICINE

## 2023-11-07 PROCEDURE — 3075F SYST BP GE 130 - 139MM HG: CPT | Performed by: INTERNAL MEDICINE

## 2023-11-07 PROCEDURE — 3079F DIAST BP 80-89 MM HG: CPT | Performed by: INTERNAL MEDICINE

## 2023-11-07 ASSESSMENT — FIBROSIS 4 INDEX: FIB4 SCORE: .79811826505878606

## 2023-11-07 NOTE — ASSESSMENT & PLAN NOTE
She was established with dermatology (Dr. Conti) and was recommended to start humiera.  She is awaiting insurance approval.  Meanwhile, the abscess on her left flank is healing well.  She has been cleansing the area her self

## 2023-11-07 NOTE — PATIENT INSTRUCTIONS
[facility] [address]  [place] [address]  Phone: [phone]       Group therapy: [facility]  [phone]

## 2023-11-07 NOTE — ASSESSMENT & PLAN NOTE
Reports anxiety and depression continue to fluctuate in terms of severity.  Reached out to the therapy referral but states that the number was discontinued and she was not able to get in touch with anyone.  Considering doing group therapy.

## 2023-11-07 NOTE — PROGRESS NOTES
Subjective:   Ernesto Ramos is a 43 y.o. female here today for f/u abscess, discuss ozempic, insect bites    Hidradenitis  She was established with dermatology (Dr. Conti) and was recommended to start humiera.  She is awaiting insurance approval.  Meanwhile, the abscess on her left flank is healing well.  She has been cleansing the area her self    Prediabetes  Last A1c was 6.0 in June 2022.  Patient wondering if she qualifies for ozempic.    Anxiety  Reports anxiety and depression continue to fluctuate in terms of severity.  Reached out to the therapy referral but states that the number was discontinued and she was not able to get in touch with anyone.  Considering doing group therapy.      Additionally, she reports that she will probably an insect bite on the corner of her left eyelid and believes she also got an insect bite in her right ear but that these areas are healing up.    Current medicines (including changes today)  Current Outpatient Medications   Medication Sig Dispense Refill    metronidazole (METROGEL) 0.75 % gel Apply topically to the affected area on the left part of the back 45 g 4    silver sulfADIAZINE (SILVADENE) 1 % Cream Apply silvadene to right buttock wound, apply 1-2 times daily as needed depending on drainage. 100 g 3    escitalopram (LEXAPRO) 10 MG Tab Take 1/2 tab daily for 1 week then increase to 1 tab daily 30 Tablet 1    traZODone (DESYREL) 100 MG Tab Take 0.5-1 Tablets by mouth at bedtime as needed for Sleep. 30 Tablet 1    Miconazole Nitrate (LOTRIMIN AF) 2 % Aerosol Apply 1 Application topically 2 times a day. 150 g 2    montelukast (SINGULAIR) 10 MG Tab Take 1 Tablet by mouth every day. 30 Tablet 2    ASHWAGANDHA PO Take  by mouth.      QVAR REDIHALER 80 MCG/ACT inhaler Inhale 2 Puffs 2 times a day. 10.6 g 5    polyethylene glycol 3350 (MIRALAX) 17 GM/SCOOP Powder Mix 17 g (1 capful) with liquid and take by mouth every day. 510 g 3    Sennosides (SENNA) 8.6 MG Tab Take 1-2  tablets by mouth at bedtime as needed (constipation). 30 Tablet 0    triamcinolone acetonide (KENALOG) 0.1 % Cream Apply thin layer to rash on neck twice daily as needed 30 g 1     No current facility-administered medications for this visit.     She  has a past medical history of Allergy, Anemia (2018), Anesthesia (07/28/2022), Asthma (07/28/2022), Bowel habit changes (07/28/2022), Breath shortness (07/28/2022), Chronic sinusitis, Disorder of thyroid (07/28/2022), GERD (gastroesophageal reflux disease), Head ache (07/28/2022), Heart burn (07/28/2022), Hydradenitis, Hydradenitis, Obesity, Psychiatric problem (07/28/2022), and Snoring (07/28/2022).         Objective:     Vitals:    11/07/23 0913   BP: 130/88   Pulse: 84   Resp: 16   Temp: 36.7 °C (98.1 °F)   SpO2: 99%     Body mass index is 59.55 kg/m².   Physical Exam:  Constitutional: Alert, no distress.  Skin: Warm, dry, good turgor, no rashes in visible areas.  Eye: Equal, round and reactive, conjunctiva clear, lids normal, small punctate lesion over inner upper lid with scab, no significant erythema  ENMT: no lesions visible in auditory canals      Results and Imaging:   Lab Results   Component Value Date/Time    HBA1C 5.6 11/07/2023 09:52 AM          Assessment and Plan:   The following treatment plan was discussed    1. Hidradenitis  She will continue close follow-up with dermatology, likely will start Humira once insurance approves.  Reassurance provided today that current open wound looks very good and no further antibiotic therapy needed    2. Prediabetes  A1c now normal.  We discussed she is not a candidate for Ozempic with current guidelines.  - POCT  A1C    3. Possible insect bites  No evidence of infection at this time.  Expect complete resolution in the near future.    4. Anxiety  Provided her information for her referral, encouraged her to follow-up as well as reach out to [facility] to discuss group therapy options.      Followup: Return  if symptoms worsen or fail to improve.

## 2023-11-20 PROCEDURE — RXMED WILLOW AMBULATORY MEDICATION CHARGE: Performed by: OTOLARYNGOLOGY

## 2023-11-21 DIAGNOSIS — J45.40 MODERATE PERSISTENT ASTHMA WITHOUT COMPLICATION: ICD-10-CM

## 2023-11-22 ENCOUNTER — PHARMACY VISIT (OUTPATIENT)
Dept: PHARMACY | Facility: MEDICAL CENTER | Age: 43
End: 2023-11-22
Payer: COMMERCIAL

## 2023-11-22 DIAGNOSIS — J45.40 MODERATE PERSISTENT ASTHMA WITHOUT COMPLICATION: ICD-10-CM

## 2023-11-22 PROCEDURE — RXMED WILLOW AMBULATORY MEDICATION CHARGE: Performed by: FAMILY MEDICINE

## 2023-11-22 RX ORDER — BECLOMETHASONE DIPROPIONATE HFA 80 UG/1
2 AEROSOL, METERED RESPIRATORY (INHALATION) 2 TIMES DAILY
Qty: 10.6 G | Refills: 5 | Status: CANCELLED | OUTPATIENT
Start: 2023-11-22

## 2023-11-22 RX ORDER — BECLOMETHASONE DIPROPIONATE HFA 80 UG/1
2 AEROSOL, METERED RESPIRATORY (INHALATION) 2 TIMES DAILY
Qty: 10.6 G | Refills: 5 | Status: SHIPPED | OUTPATIENT
Start: 2023-11-22

## 2023-11-22 NOTE — TELEPHONE ENCOUNTER
Patient came into office wanting to get this before the end of the day as she is out and we are closed.

## 2023-12-28 PROCEDURE — RXMED WILLOW AMBULATORY MEDICATION CHARGE: Performed by: FAMILY MEDICINE

## 2023-12-29 ENCOUNTER — PHARMACY VISIT (OUTPATIENT)
Dept: PHARMACY | Facility: MEDICAL CENTER | Age: 43
End: 2023-12-29
Payer: COMMERCIAL

## 2023-12-29 ENCOUNTER — OFFICE VISIT (OUTPATIENT)
Dept: MEDICAL GROUP | Facility: MEDICAL CENTER | Age: 43
End: 2023-12-29
Payer: MEDICAID

## 2023-12-29 VITALS
RESPIRATION RATE: 20 BRPM | WEIGHT: 293 LBS | HEIGHT: 70 IN | DIASTOLIC BLOOD PRESSURE: 88 MMHG | SYSTOLIC BLOOD PRESSURE: 130 MMHG | BODY MASS INDEX: 41.95 KG/M2 | TEMPERATURE: 97.9 F | HEART RATE: 100 BPM | OXYGEN SATURATION: 98 %

## 2023-12-29 DIAGNOSIS — J45.40 MODERATE PERSISTENT ASTHMA WITHOUT COMPLICATION: ICD-10-CM

## 2023-12-29 DIAGNOSIS — Z77.098 EXPOSURE TO CHEMICAL INHALATION: ICD-10-CM

## 2023-12-29 PROCEDURE — 99213 OFFICE O/P EST LOW 20 MIN: CPT

## 2023-12-29 PROCEDURE — 3075F SYST BP GE 130 - 139MM HG: CPT

## 2023-12-29 PROCEDURE — 3079F DIAST BP 80-89 MM HG: CPT

## 2023-12-29 PROCEDURE — 99214 OFFICE O/P EST MOD 30 MIN: CPT

## 2023-12-29 PROCEDURE — RXMED WILLOW AMBULATORY MEDICATION CHARGE

## 2023-12-29 RX ORDER — BUDESONIDE AND FORMOTEROL FUMARATE DIHYDRATE 80; 4.5 UG/1; UG/1
2 AEROSOL RESPIRATORY (INHALATION) 2 TIMES DAILY PRN
Qty: 10.2 G | Refills: 1 | Status: SHIPPED | OUTPATIENT
Start: 2023-12-29 | End: 2024-02-20

## 2023-12-29 RX ORDER — METHYLPREDNISOLONE 4 MG/1
TABLET ORAL
Qty: 21 TABLET | Refills: 0 | Status: SHIPPED | OUTPATIENT
Start: 2023-12-29 | End: 2024-02-20

## 2023-12-29 ASSESSMENT — FIBROSIS 4 INDEX: FIB4 SCORE: .79811826505878606

## 2023-12-29 NOTE — PROGRESS NOTES
"Subjective:     CC: worsening cough    HPI:   Ernesto presents today with    Problem   Moderate Persistent Asthma    Patient reports that yesterday afternoon she was moving into a new house and was using cleaning chemicals. Many of those chemicals such as bleach, lysol, and bathroom  were combined. She has since been unable to stop coughing. She denies any fevers, sick contacts, chest pain or SOB. She has been using her qvar inhaler with no improvement of her symptoms. She denies hemoptysis or headaches, syncopal episodes, lightheadedness, or dizziness.              ROS:  - CONSTITUTIONAL: Denies weight loss, fever and chills.  - HEENT: Denies changes in vision and hearing.  - RESPIRATORY: +SOB, wheeze and cough.  - CV: Denies palpitations and CP.  - GI: Denies abdominal pain, nausea, vomiting and diarrhea.  - : Denies dysuria and urinary frequency.  - MSK: Denies myalgia and joint pain.  - SKIN: Denies rash and pruritus.  - NEUROLOGICAL: Denies headache and syncope.  - PSYCHIATRIC: Denies recent changes in mood. Denies anxiety and depression.    Please see HPI for additional ROS.       Objective:     Exam:  /88 (BP Location: Left arm, Patient Position: Sitting, BP Cuff Size: Adult long)   Pulse 100   Temp 36.6 °C (97.9 °F) (Temporal)   Resp 20   Ht 1.778 m (5' 10\")   Wt (!) 186 kg (409 lb)   SpO2 98%   BMI 58.69 kg/m²  Body mass index is 58.69 kg/m².    Physical Exam:  Constitutional: Alert, no distress, well-groomed.  Skin: Warm, dry, good turgor, no rashes in visible areas.  Eye: Equal, round and reactive, conjunctiva clear, lids normal.  ENMT: Lips without lesions, good dentition, moist mucous membranes.  Neck: Trachea midline, no masses, no thyromegaly.  Respiratory: + inspiratory and expiratory wheezes throughout all lung hensley R/L.  Abd: soft, non tender, non distended, normal BS  MSK: Normal gait, moves all extremities.  Neuro: Grossly non-focal.   Psych: Alert and oriented x3, normal " affect and mood.    Labs: Reviewed    Assessment & Plan:     43 y.o. female with the following -       1. Moderate persistent asthma without complication  Acute on chronic.  Given her chemical inhalation exposure, we will treat her with a Medrol Dosepak.  Also discussed trying Symbicort for some long acting bronchodilator effects.  We discussed that Symbicort is less likely to give her the palpitations that she experiences with albuterol. It was recommended that she avoid sick people, allergens such as dander and mold, chemicals, and avoid smoke.  She is not going back to the home that she was cleaning. We discussed signs and symptoms that warrant further evaluation in the ER.    - methylPREDNISolone (MEDROL DOSEPAK) 4 MG Tablet Therapy Pack; Follow schedule on package  Dispense: 21 Tablet; Refill: 0  - budesonide-formoterol (SYMBICORT) 80-4.5 MCG/ACT Aerosol; Inhale 2 Puffs 2 times a day as needed (for shortness of breath and wheezing).  Dispense: 10.2 g; Refill: 1    2. Exposure to chemical inhalation  See assessment and plan 1.        Return if symptoms worsen or fail to improve.    Please note that this dictation was created using voice recognition software. I have made every reasonable attempt to correct obvious errors, but I expect that there are errors of grammar and possibly content that I did not discover before finalizing the note.

## 2024-01-08 ENCOUNTER — PATIENT MESSAGE (OUTPATIENT)
Dept: MEDICAL GROUP | Facility: MEDICAL CENTER | Age: 44
End: 2024-01-08
Payer: MEDICAID

## 2024-01-09 ENCOUNTER — HOSPITAL ENCOUNTER (OUTPATIENT)
Dept: RADIOLOGY | Facility: MEDICAL CENTER | Age: 44
End: 2024-01-09
Attending: NEUROLOGICAL SURGERY
Payer: MEDICAID

## 2024-01-09 DIAGNOSIS — D35.2 BENIGN NEOPLASM OF PITUITARY GLAND AND CRANIOPHARYNGEAL DUCT (POUCH) (HCC): ICD-10-CM

## 2024-01-09 DIAGNOSIS — D35.3 BENIGN NEOPLASM OF PITUITARY GLAND AND CRANIOPHARYNGEAL DUCT (POUCH) (HCC): ICD-10-CM

## 2024-01-09 PROCEDURE — 700117 HCHG RX CONTRAST REV CODE 255: Mod: UD | Performed by: NEUROLOGICAL SURGERY

## 2024-01-09 PROCEDURE — 70553 MRI BRAIN STEM W/O & W/DYE: CPT

## 2024-01-09 PROCEDURE — A9579 GAD-BASE MR CONTRAST NOS,1ML: HCPCS | Mod: UD | Performed by: NEUROLOGICAL SURGERY

## 2024-01-09 RX ADMIN — GADOTERIDOL 20 ML: 279.3 INJECTION, SOLUTION INTRAVENOUS at 16:17

## 2024-01-24 PROCEDURE — RXMED WILLOW AMBULATORY MEDICATION CHARGE: Performed by: FAMILY MEDICINE

## 2024-01-24 PROCEDURE — RXMED WILLOW AMBULATORY MEDICATION CHARGE: Performed by: INTERNAL MEDICINE

## 2024-01-24 PROCEDURE — RXMED WILLOW AMBULATORY MEDICATION CHARGE: Performed by: OTOLARYNGOLOGY

## 2024-01-25 ENCOUNTER — PHARMACY VISIT (OUTPATIENT)
Dept: PHARMACY | Facility: MEDICAL CENTER | Age: 44
End: 2024-01-25
Payer: COMMERCIAL

## 2024-01-25 PROCEDURE — RXMED WILLOW AMBULATORY MEDICATION CHARGE

## 2024-02-20 ENCOUNTER — OFFICE VISIT (OUTPATIENT)
Dept: MEDICAL GROUP | Facility: MEDICAL CENTER | Age: 44
End: 2024-02-20
Attending: INTERNAL MEDICINE
Payer: MEDICAID

## 2024-02-20 ENCOUNTER — PHARMACY VISIT (OUTPATIENT)
Dept: PHARMACY | Facility: MEDICAL CENTER | Age: 44
End: 2024-02-20
Payer: COMMERCIAL

## 2024-02-20 VITALS
SYSTOLIC BLOOD PRESSURE: 128 MMHG | RESPIRATION RATE: 16 BRPM | TEMPERATURE: 97.3 F | OXYGEN SATURATION: 98 % | HEART RATE: 86 BPM | DIASTOLIC BLOOD PRESSURE: 88 MMHG | HEIGHT: 70 IN | BODY MASS INDEX: 41.95 KG/M2 | WEIGHT: 293 LBS

## 2024-02-20 DIAGNOSIS — J45.40 MODERATE PERSISTENT ASTHMA WITHOUT COMPLICATION: ICD-10-CM

## 2024-02-20 DIAGNOSIS — K21.9 GASTROESOPHAGEAL REFLUX DISEASE WITHOUT ESOPHAGITIS: ICD-10-CM

## 2024-02-20 DIAGNOSIS — Z91.09 ENVIRONMENTAL ALLERGIES: ICD-10-CM

## 2024-02-20 DIAGNOSIS — F51.01 PRIMARY INSOMNIA: ICD-10-CM

## 2024-02-20 DIAGNOSIS — F33.2 SEVERE EPISODE OF RECURRENT MAJOR DEPRESSIVE DISORDER, WITHOUT PSYCHOTIC FEATURES (HCC): ICD-10-CM

## 2024-02-20 DIAGNOSIS — E66.01 MORBID OBESITY (HCC): ICD-10-CM

## 2024-02-20 DIAGNOSIS — F41.9 ANXIETY: ICD-10-CM

## 2024-02-20 DIAGNOSIS — L73.2 HIDRADENITIS: ICD-10-CM

## 2024-02-20 PROBLEM — R06.02 SHORTNESS OF BREATH: Status: RESOLVED | Noted: 2022-06-09 | Resolved: 2024-02-20

## 2024-02-20 PROBLEM — T78.40XA ALLERGIES: Status: RESOLVED | Noted: 2023-07-07 | Resolved: 2024-02-20

## 2024-02-20 PROCEDURE — RXMED WILLOW AMBULATORY MEDICATION CHARGE: Performed by: INTERNAL MEDICINE

## 2024-02-20 PROCEDURE — 99214 OFFICE O/P EST MOD 30 MIN: CPT | Performed by: INTERNAL MEDICINE

## 2024-02-20 PROCEDURE — 3079F DIAST BP 80-89 MM HG: CPT | Performed by: INTERNAL MEDICINE

## 2024-02-20 PROCEDURE — 3074F SYST BP LT 130 MM HG: CPT | Performed by: INTERNAL MEDICINE

## 2024-02-20 RX ORDER — ADALIMUMAB-BWWD 40 MG/.4ML
SOLUTION SUBCUTANEOUS
COMMUNITY
Start: 2024-02-06

## 2024-02-20 RX ORDER — ESCITALOPRAM OXALATE 10 MG/1
10 TABLET ORAL DAILY
Qty: 30 TABLET | Refills: 1 | Status: SHIPPED | OUTPATIENT
Start: 2024-02-20

## 2024-02-20 RX ORDER — MONTELUKAST SODIUM 10 MG/1
10 TABLET ORAL DAILY
Qty: 30 TABLET | Refills: 5 | Status: SHIPPED | OUTPATIENT
Start: 2024-02-20

## 2024-02-20 ASSESSMENT — FIBROSIS 4 INDEX: FIB4 SCORE: .79811826505878606

## 2024-02-20 NOTE — ASSESSMENT & PLAN NOTE
"She continues to report severe difficulty with sleeping at night.  States that it takes her anywhere from 1 to 4 hours to fall asleep.  She also reports 3-4 nocturnal awakenings at night for various reasons.  Sometimes it is to go to the bathroom.  Sometimes it is related to her breathing and sometimes she wakes up for unknown reasons.  States when she takes the trazodone she has \"severe nightmares\".  "

## 2024-02-20 NOTE — ASSESSMENT & PLAN NOTE
She continues to report severe allergies which cause respiratory problems.  As previously noted, she has seen multiple allergist in the past and had skin testing done with many positive results.  States she has been taking an over-the-counter [employer] brand allergy medication which helps.  She has tried Allegra in the past and reports this works for about 2 weeks and then stops working.  She did not find benefit with Flonase.  Was given a prescription for Singulair but does not sound like she started taking it.  Did receive a Kenalog injection in clinic last summer which helped a lot with her pollen based allergies.

## 2024-02-20 NOTE — PROGRESS NOTES
Subjective:   Ernesto Ramos is a 43 y.o. female here today for worsening breathing, anxiety, depression, allergies, issues below    Moderate persistent asthma  She continues to report significant shortness of breath.  Reports that she has only been taking her Qvar in the mornings (2 puffs) but feels like she is getting breakthrough symptoms in the evening with worsening shortness of breath and sometimes wheezing that is waking her up.  Also having difficulty walking to the mailbox without getting short of breath.  She was given a Medrol Dosepak on 12/29 at her visit but only took half of it.  Notices that when she takes steroids she has some emotional side effects.  Reports some new skin rashes recently as well.    Anxiety  Reports that her anxiety is severe.  She feels like it is gradually getting worse because she is starting to get more panic attacks, feel like something bad is going to happen to her on a regular basis or that she is going to be attacked.  States that because of her anxiety she has gone through 8 jobs in the past year.  Is very nervous during her interviews and not getting callbacks because of this.  As discussed below, she only recently started taking Lexapro at a very low dose.  She has been referred to psychiatry and psychology on numerous occasions but has not gone.  Today she reports she is interested in following up.    Environmental allergies  She continues to report severe allergies which cause respiratory problems.  As previously noted, she has seen multiple allergist in the past and had skin testing done with many positive results.  States she has been taking an over-the-counter [employer] brand allergy medication which helps.  She has tried Allegra in the past and reports this works for about 2 weeks and then stops working.  She did not find benefit with Flonase.  Was given a prescription for Singulair but does not sound like she started taking it.  Did receive a Kenalog injection in  "clinic last summer which helped a lot with her pollen based allergies.      Severe episode of recurrent major depressive disorder, without psychotic features (HCC)  Reports worsening depression/anxiety.  In December, she started taking her Lexapro regularly but was only taking half a tablet (5 mg daily).  About a week ago reports symptoms got so severe that she started taking 1 full tablet daily but she has not noticed much of a change.  She denies suicidal ideation.    Primary insomnia  She continues to report severe difficulty with sleeping at night.  States that it takes her anywhere from 1 to 4 hours to fall asleep.  She also reports 3-4 nocturnal awakenings at night for various reasons.  Sometimes it is to go to the bathroom.  Sometimes it is related to her breathing and sometimes she wakes up for unknown reasons.  States when she takes the trazodone she has \"severe nightmares\".    Hidradenitis  Seeing Dr. Conti and just started on Hadlima.  States that she just took her first dose last week.    Gastroesophageal reflux disease without esophagitis  She reports more GERD symptoms at night.  She thought this was because of her Qvar so she stopped using her nocturnal dose.  However, she still continues to report symptoms.  She is not taking any antacids.  Usually does not eat anything after 4 PM and goes to bed around 10 PM.       Current medicines (including changes today)  Current Outpatient Medications   Medication Sig Dispense Refill    HADLIMA PUSHTOUCH 40 MG/0.4ML Solution Auto-injector       escitalopram (LEXAPRO) 10 MG Tab Take 1 Tablet by mouth every day. 30 Tablet 1    montelukast (SINGULAIR) 10 MG Tab Take 1 Tablet by mouth every day. 30 Tablet 5    QVAR REDIHALER 80 MCG/ACT inhaler Inhale 2 Puffs 2 times a day. 10.6 g 5    metronidazole (METROGEL) 0.75 % gel Apply topically to the affected area on the left part of the back 45 g 4    traZODone (DESYREL) 100 MG Tab Take 0.5-1 Tablets by mouth at bedtime " as needed for Sleep. 30 Tablet 1    Miconazole Nitrate (LOTRIMIN AF) 2 % Aerosol Apply 1 Application topically 2 times a day. 150 g 2    ASHWAGANDHA PO Take  by mouth.      polyethylene glycol 3350 (MIRALAX) 17 GM/SCOOP Powder Mix 17 g (1 capful) with liquid and take by mouth every day. 510 g 3    Sennosides (SENNA) 8.6 MG Tab Take 1-2 tablets by mouth at bedtime as needed (constipation). 30 Tablet 0    triamcinolone acetonide (KENALOG) 0.1 % Cream Apply thin layer to rash on neck twice daily as needed 30 g 1     No current facility-administered medications for this visit.     She  has a past medical history of Allergy, Anemia (2018), Anesthesia (07/28/2022), Asthma (07/28/2022), Bowel habit changes (07/28/2022), Breath shortness (07/28/2022), Chronic sinusitis, Disorder of thyroid (07/28/2022), GERD (gastroesophageal reflux disease), Head ache (07/28/2022), Heart burn (07/28/2022), Hydradenitis, Hydradenitis, Obesity, Psychiatric problem (07/28/2022), and Snoring (07/28/2022).         Objective:     Vitals:    02/20/24 0746   BP: 128/88   Pulse: 86   Resp: 16   Temp: 36.3 °C (97.3 °F)   SpO2: 98%     Body mass index is 58.25 kg/m².   Physical Exam:  Constitutional: Alert, no distress.  Skin: Warm, dry, good turgor, red well delinated rash over left inner arm with central clearing with scaling.    Eye: Equal, round and reactive, conjunctiva clear, lids normal.  Respiratory: Unlabored respiratory effort, lungs clear to auscultation, no wheezes, no ronchi.  Cardiovascular: Regular rate and rhythm, no murmurs appreciated, no lower extremity edema  Psych: Alert and oriented x3, flat affect, depressed mood        Assessment and Plan:   The following treatment plan was discussed    1. Moderate persistent asthma without complication  Uncontrolled.  We discussed proper use of Qvar inhaler and encouraged her to start using it twice daily.  Discussed benefit that Singulair could have for both her allergies and asthma and  encouraged her to start it.  -Continue Qvar 80 mg but start taking it twice daily instead of daily  - montelukast (SINGULAIR) 10 MG Tab; Take 1 Tablet by mouth every day.  Dispense: 30 Tablet; Refill: 5    2. Anxiety  Uncontrolled however she has not been on the full dose of Lexapro for very long.  Discussed importance of her getting established with therapy and psychiatry given longstanding depression and anxiety which are disabling for her.  Discussed continuing 10 mg Lexapro dose for at least 4 to 6 weeks before assessing benefit  - escitalopram (LEXAPRO) 10 MG Tab; Take 1 Tablet by mouth every day.  Dispense: 30 Tablet; Refill: 1  - Referral to Psychology  - Referral to Psychiatry    3. Severe episode of recurrent major depressive disorder, without psychotic features (HCC)  See discussion above  - escitalopram (LEXAPRO) 10 MG Tab; Take 1 Tablet by mouth every day.  Dispense: 30 Tablet; Refill: 1  - Referral to Psychology  - Referral to Psychiatry    4. Morbid obesity (HCC)  Given her disrupted sleep, concern for WENDY.  She has never been tested in the past.  Discussed overnight pulse oximetry testing now.  If she screens positive then would consider either in-home sleep study or formal in center sleep study with pulmonology referral  -OPO testing at home    5. Environmental allergies  Offered referral back to allergy however this would only be useful if she would like to get allergy injections given she is already had testing.  At this point, she prefers to hold off.  We discussed that she could get another Kenalog shot but would probably wait until spring when the pollen gets worse.  Will add Singulair.  -Continue OTC allergy medication  - montelukast (SINGULAIR) 10 MG Tab; Take 1 Tablet by mouth every day.  Dispense: 30 Tablet; Refill: 5    6. Primary insomnia  Uncontrolled.  Likely multifactorial in the setting of depression, anxiety, possibly sleep apnea, severe breathing issues.  Did not do well with  trazodone.    7. Hidradenitis  She is following closely with dermatology.  Encouraged her to make follow-up visit when she has been on her new medication for about a month.  I also took a picture of her current rash which I do not think is medication related however asked her to show this to her dermatologist when she sees her next.        Followup: Return in about 6 weeks (around 4/2/2024).

## 2024-02-20 NOTE — ASSESSMENT & PLAN NOTE
She continues to report significant shortness of breath.  Reports that she has only been taking her Qvar in the mornings (2 puffs) but feels like she is getting breakthrough symptoms in the evening with worsening shortness of breath and sometimes wheezing that is waking her up.  Also having difficulty walking to the mailbox without getting short of breath.  She was given a Medrol Dosepak on 12/29 at her visit but only took half of it.  Notices that when she takes steroids she has some emotional side effects.  Reports some new skin rashes recently as well.

## 2024-02-20 NOTE — ASSESSMENT & PLAN NOTE
She reports more GERD symptoms at night.  She thought this was because of her Qvar so she stopped using her nocturnal dose.  However, she still continues to report symptoms.  She is not taking any antacids.  Usually does not eat anything after 4 PM and goes to bed around 10 PM.

## 2024-02-20 NOTE — ASSESSMENT & PLAN NOTE
Seeing Dr. Conti and just started on Hadlima.  States that she just took her first dose last week.

## 2024-02-20 NOTE — ASSESSMENT & PLAN NOTE
Reports worsening depression/anxiety.  In December, she started taking her Lexapro regularly but was only taking half a tablet (5 mg daily).  About a week ago reports symptoms got so severe that she started taking 1 full tablet daily but she has not noticed much of a change.  She denies suicidal ideation.

## 2024-03-21 DIAGNOSIS — F51.01 PRIMARY INSOMNIA: ICD-10-CM

## 2024-03-21 PROCEDURE — RXMED WILLOW AMBULATORY MEDICATION CHARGE: Performed by: INTERNAL MEDICINE

## 2024-03-21 PROCEDURE — RXMED WILLOW AMBULATORY MEDICATION CHARGE: Performed by: FAMILY MEDICINE

## 2024-03-21 PROCEDURE — RXMED WILLOW AMBULATORY MEDICATION CHARGE: Performed by: DERMATOLOGY

## 2024-03-21 RX ORDER — TRAZODONE HYDROCHLORIDE 100 MG/1
50-100 TABLET ORAL NIGHTLY PRN
Qty: 30 TABLET | Refills: 3 | Status: SHIPPED | OUTPATIENT
Start: 2024-03-21

## 2024-03-21 NOTE — TELEPHONE ENCOUNTER
Received request via: Patient    Was the patient seen in the last year in this department? Yes    Does the patient have an active prescription (recently filled or refills available) for medication(s) requested? No    Pharmacy Name: [facility]    Does the patient have [facility] Plus and need 100 day supply (blood pressure, diabetes and cholesterol meds only)? Patient does not have SCP    Future Appointments         Provider Department [place]    4/2/2024 8:00 AM (Arrive by 7:45 AM) Leticia Houser M.D. [facility]

## 2024-03-22 ENCOUNTER — OFFICE VISIT (OUTPATIENT)
Dept: MEDICAL GROUP | Facility: MEDICAL CENTER | Age: 44
End: 2024-03-22
Attending: INTERNAL MEDICINE
Payer: MEDICAID

## 2024-03-22 ENCOUNTER — PHARMACY VISIT (OUTPATIENT)
Dept: PHARMACY | Facility: MEDICAL CENTER | Age: 44
End: 2024-03-22
Payer: COMMERCIAL

## 2024-03-22 VITALS
BODY MASS INDEX: 41.95 KG/M2 | DIASTOLIC BLOOD PRESSURE: 88 MMHG | WEIGHT: 293 LBS | TEMPERATURE: 98.1 F | SYSTOLIC BLOOD PRESSURE: 130 MMHG | HEIGHT: 70 IN | RESPIRATION RATE: 20 BRPM | OXYGEN SATURATION: 93 % | HEART RATE: 108 BPM

## 2024-03-22 DIAGNOSIS — J45.41 MODERATE PERSISTENT ASTHMA WITH ACUTE EXACERBATION: ICD-10-CM

## 2024-03-22 DIAGNOSIS — T78.40XD ALLERGY, SUBSEQUENT ENCOUNTER: ICD-10-CM

## 2024-03-22 DIAGNOSIS — R06.2 WHEEZING: ICD-10-CM

## 2024-03-22 PROCEDURE — 3079F DIAST BP 80-89 MM HG: CPT | Performed by: INTERNAL MEDICINE

## 2024-03-22 PROCEDURE — 99213 OFFICE O/P EST LOW 20 MIN: CPT | Performed by: INTERNAL MEDICINE

## 2024-03-22 PROCEDURE — 99212 OFFICE O/P EST SF 10 MIN: CPT | Performed by: INTERNAL MEDICINE

## 2024-03-22 PROCEDURE — 3075F SYST BP GE 130 - 139MM HG: CPT | Performed by: INTERNAL MEDICINE

## 2024-03-22 PROCEDURE — RXMED WILLOW AMBULATORY MEDICATION CHARGE: Performed by: INTERNAL MEDICINE

## 2024-03-22 RX ORDER — PREDNISONE 20 MG/1
40 TABLET ORAL DAILY
Qty: 10 TABLET | Refills: 0 | Status: SHIPPED | OUTPATIENT
Start: 2024-03-22 | End: 2024-03-27

## 2024-03-22 RX ORDER — PREDNISONE 20 MG/1
20 TABLET ORAL DAILY
Qty: 5 TABLET | Refills: 0 | OUTPATIENT
Start: 2024-03-22 | End: 2024-03-27

## 2024-03-22 ASSESSMENT — FIBROSIS 4 INDEX: FIB4 SCORE: .79811826505878606

## 2024-03-22 NOTE — PROGRESS NOTES
Subjective:   Ernesto Ramos is a 43 y.o. female here today for worsening cough and shortness of breath    Moderate persistent asthma with acute exacerbation  About a week ago she started to have increased cough and shortness of breath as well as chest tightness.  Feels like her Qvar is not working.  Reports increased stress related to a new job.  Otherwise cannot identify an obvious trigger.  Denies recent sick contacts but states she generally does not feel well.  Denies fevers, chills, change in sputum.         Current medicines (including changes today)  Current Outpatient Medications   Medication Sig Dispense Refill    predniSONE (DELTASONE) 20 MG Tab Take 2 Tablets by mouth every day for 5 days. 10 Tablet 0    traZODone (DESYREL) 100 MG Tab Take 0.5-1 Tablets by mouth at bedtime as needed for Sleep. 30 Tablet 3    HADLIMA PUSHTOUCH 40 MG/0.4ML Solution Auto-injector       escitalopram (LEXAPRO) 10 MG Tab Take 1 Tablet by mouth every day. 30 Tablet 1    montelukast (SINGULAIR) 10 MG Tab Take 1 Tablet by mouth every day. 30 Tablet 5    QVAR REDIHALER 80 MCG/ACT inhaler Inhale 2 Puffs 2 times a day. 10.6 g 5    metronidazole (METROGEL) 0.75 % gel Apply topically to the affected area on the left part of the back 45 g 4    Miconazole Nitrate (LOTRIMIN AF) 2 % Aerosol Apply 1 Application topically 2 times a day. 150 g 2    ASHWAGANDHA PO Take  by mouth.      polyethylene glycol 3350 (MIRALAX) 17 GM/SCOOP Powder Mix 17 g (1 capful) with liquid and take by mouth every day. 510 g 3    Sennosides (SENNA) 8.6 MG Tab Take 1-2 tablets by mouth at bedtime as needed (constipation). 30 Tablet 0    triamcinolone acetonide (KENALOG) 0.1 % Cream Apply thin layer to rash on neck twice daily as needed 30 g 1     No current facility-administered medications for this visit.     She  has a past medical history of Allergy, Anemia (2018), Anesthesia (07/28/2022), Asthma (07/28/2022), Bowel habit changes (07/28/2022), Breath  shortness (07/28/2022), Chronic sinusitis, Disorder of thyroid (07/28/2022), GERD (gastroesophageal reflux disease), Head ache (07/28/2022), Heart burn (07/28/2022), Hydradenitis, Hydradenitis, Obesity, Psychiatric problem (07/28/2022), and Snoring (07/28/2022).         Objective:     Vitals:    03/22/24 1432   BP: 130/88   Pulse: (!) 108   Resp: 20   Temp: 36.7 °C (98.1 °F)   SpO2: 93%     Body mass index is 59.55 kg/m².   Physical Exam:  Constitutional: Alert, no distress.  Skin: Warm, dry, good turgor, no rashes in visible areas.  Eye: Equal, round and reactive, conjunctiva clear, lids normal.  Respiratory: coughing, scattered bibasilar wheezes  Psych: Alert and oriented x3, normal affect and mood.        Assessment and Plan:   The following treatment plan was discussed    1. Moderate persistent asthma with acute exacerbation  Symptoms are consistent with asthma exacerbation, no specific trigger but does not appear infectious.  Discussed prednisone burst which has been helpful for her in the past and follow-up if symptoms do not improve.  - predniSONE (DELTASONE) 20 MG Tab; Take 2 Tablets by mouth every day for 5 days.  Dispense: 10 Tablet; Refill: 0        Followup: Return if symptoms worsen or fail to improve.

## 2024-03-22 NOTE — ASSESSMENT & PLAN NOTE
About a week ago she started to have increased cough and shortness of breath as well as chest tightness.  Feels like her Qvar is not working.  Reports increased stress related to a new job.  Otherwise cannot identify an obvious trigger.  Denies recent sick contacts but states she generally does not feel well.  Denies fevers, chills, change in sputum.

## 2024-03-22 NOTE — TELEPHONE ENCOUNTER
Received request via: Pharmacy    Was the patient seen in the last year in this department? Yes    Does the patient have an active prescription (recently filled or refills available) for medication(s) requested? No    [facility]    Does the patient have [facility] Plus and need 100 day supply (blood pressure, diabetes and cholesterol meds only)? Patient does not have SCP

## 2024-04-02 ENCOUNTER — PATIENT MESSAGE (OUTPATIENT)
Dept: MEDICAL GROUP | Facility: MEDICAL CENTER | Age: 44
End: 2024-04-02
Payer: MEDICAID

## 2024-04-02 DIAGNOSIS — B37.31 VAGINAL YEAST INFECTION: ICD-10-CM

## 2024-04-02 PROCEDURE — RXMED WILLOW AMBULATORY MEDICATION CHARGE: Performed by: INTERNAL MEDICINE

## 2024-04-02 RX ORDER — FLUCONAZOLE 150 MG/1
150 TABLET ORAL DAILY
Qty: 1 TABLET | Refills: 0 | Status: SHIPPED | OUTPATIENT
Start: 2024-04-02

## 2024-04-09 ENCOUNTER — PHARMACY VISIT (OUTPATIENT)
Dept: PHARMACY | Facility: MEDICAL CENTER | Age: 44
End: 2024-04-09
Payer: COMMERCIAL

## 2024-04-16 DIAGNOSIS — F33.2 SEVERE EPISODE OF RECURRENT MAJOR DEPRESSIVE DISORDER, WITHOUT PSYCHOTIC FEATURES (HCC): ICD-10-CM

## 2024-04-16 DIAGNOSIS — F41.9 ANXIETY: ICD-10-CM

## 2024-04-17 PROCEDURE — RXMED WILLOW AMBULATORY MEDICATION CHARGE: Performed by: FAMILY MEDICINE

## 2024-04-17 PROCEDURE — RXMED WILLOW AMBULATORY MEDICATION CHARGE: Performed by: INTERNAL MEDICINE

## 2024-04-17 RX ORDER — ESCITALOPRAM OXALATE 10 MG/1
10 TABLET ORAL DAILY
Qty: 30 TABLET | Refills: 5 | Status: SHIPPED | OUTPATIENT
Start: 2024-04-17

## 2024-04-17 NOTE — TELEPHONE ENCOUNTER
Received request via: Pharmacy    Was the patient seen in the last year in this department? Yes    Does the patient have an active prescription (recently filled or refills available) for medication(s) requested? No    Pharmacy Name: [facility]    Does the patient have [facility] Plus and need 100 day supply (blood pressure, diabetes and cholesterol meds only)? Patient does not have SCP

## 2024-04-18 ENCOUNTER — PHARMACY VISIT (OUTPATIENT)
Dept: PHARMACY | Facility: MEDICAL CENTER | Age: 44
End: 2024-04-18
Payer: COMMERCIAL

## 2024-04-19 PROCEDURE — RXMED WILLOW AMBULATORY MEDICATION CHARGE: Performed by: INTERNAL MEDICINE

## 2024-04-22 ENCOUNTER — OFFICE VISIT (OUTPATIENT)
Dept: MEDICAL GROUP | Facility: MEDICAL CENTER | Age: 44
End: 2024-04-22
Attending: FAMILY MEDICINE
Payer: MEDICAID

## 2024-04-22 ENCOUNTER — PHARMACY VISIT (OUTPATIENT)
Dept: PHARMACY | Facility: MEDICAL CENTER | Age: 44
End: 2024-04-22
Payer: COMMERCIAL

## 2024-04-22 VITALS
HEART RATE: 83 BPM | OXYGEN SATURATION: 95 % | HEIGHT: 70 IN | BODY MASS INDEX: 41.95 KG/M2 | TEMPERATURE: 97.2 F | WEIGHT: 293 LBS | RESPIRATION RATE: 16 BRPM | DIASTOLIC BLOOD PRESSURE: 62 MMHG | SYSTOLIC BLOOD PRESSURE: 110 MMHG

## 2024-04-22 DIAGNOSIS — J45.41 MODERATE PERSISTENT ASTHMA WITH ACUTE EXACERBATION: ICD-10-CM

## 2024-04-22 DIAGNOSIS — F41.9 ANXIETY: ICD-10-CM

## 2024-04-22 DIAGNOSIS — E66.01 CLASS 3 SEVERE OBESITY DUE TO EXCESS CALORIES WITH SERIOUS COMORBIDITY AND BODY MASS INDEX (BMI) OF 50.0 TO 59.9 IN ADULT (HCC): ICD-10-CM

## 2024-04-22 PROCEDURE — 99213 OFFICE O/P EST LOW 20 MIN: CPT | Performed by: FAMILY MEDICINE

## 2024-04-22 PROCEDURE — 3078F DIAST BP <80 MM HG: CPT | Performed by: FAMILY MEDICINE

## 2024-04-22 PROCEDURE — RXMED WILLOW AMBULATORY MEDICATION CHARGE: Performed by: FAMILY MEDICINE

## 2024-04-22 PROCEDURE — 3074F SYST BP LT 130 MM HG: CPT | Performed by: FAMILY MEDICINE

## 2024-04-22 PROCEDURE — 99214 OFFICE O/P EST MOD 30 MIN: CPT | Performed by: FAMILY MEDICINE

## 2024-04-22 RX ORDER — PREDNISONE 10 MG/1
40 TABLET ORAL DAILY
Qty: 20 TABLET | Refills: 0 | Status: SHIPPED | OUTPATIENT
Start: 2024-04-22 | End: 2024-04-27

## 2024-04-22 ASSESSMENT — FIBROSIS 4 INDEX: FIB4 SCORE: .79811826505878606

## 2024-04-22 NOTE — PROGRESS NOTES
Verbal consent was acquired by the patient to use [employer] ambient listening note generation during this visit.    Subjective   Chief Complaint   Patient presents with    Other    Medication Refill        HPI:   Ernesto presents today with    History of Present Illness  The patient presents for evaluation of multiple medical concerns.    The patient reports an exacerbation of her asthma symptoms which was milder compared to previous episodes that occurred last night. She has been managing her symptoms primarily with Singulair, as her Qvar inhaler, which she has been using since 2015, is no longer effective. She has previously consulted with a pulmonologist, Dr. Kang, and more recently has established with an allergist, Dr. Carlyle Pires, who prescribed Trelegy and Atrovent. She was hesitant to start these new inhalers as in the past she has had issues with inhalers that she felt caused severe joint pain, leading her to discontinue its use. She expresses concern about potential side effects, including glaucoma and cataracts. Over the past weekend, she has been experiencing coughing fits that is worse when lying down, accompanied by coughing and wheezing.    The patient expresses interest in Ozempic for weight loss. She believes her overeating are coping mechanisms for uncontrolled anxiety and panic disorder which has led to excess weight. Also states that history of HS has contributed to her anxiety and isolation.  The patient is currently on escitalopram for anxiety. She occasionally experiences panic attacks, characterized by sweating and tachycardia, necessitating rest. Initially, she attributed these symptoms to hypertension or a fainting episode, but later realized it was a panic attack. These episodes typically occur when she is in the grocery store or around people. She lives alone, and has no local support system. She reports fatigue, particularly when walking due to pain in her legs and lower back. Her  "depression is severe enough to hinder her daily activities including house cleaning.  She admits to loneliness and isolation, and feels that if she does not eat, she will continue to feel lonely and empty.       Health Maintenance Due   Topic Date Due    HIV Screening  Never done    Hepatitis C Screening  Never done    Hepatitis B Vaccine (Hep B) (1 of 3 - 19+ 3-dose series) Never done    Colorectal Cancer Screening  Never done    COVID-19 Vaccine (3 - 2023-24 season) 09/01/2023       Objective   Social History     Tobacco Use    Smoking status: Never    Smokeless tobacco: Never   Vaping Use    Vaping Use: Never used   Substance Use Topics    Alcohol use: Yes     Comment: rarely    Drug use: Never       Exam:  /62 (BP Location: Left arm, Patient Position: Sitting, BP Cuff Size: Adult)   Pulse 83   Temp 36.2 °C (97.2 °F) (Temporal)   Resp 16   Ht 1.778 m (5' 10\")   Wt (!) 187 kg (412 lb)   SpO2 95%   BMI 59.12 kg/m²     Physical Exam  Constitutional: Alert, no distress  Skin: No rashes in visible areas  Eye: Conjunctiva clear, lids normal  Respiratory: Unlabored respiratory effort, no cough, trace expiratory wheeze at lower lung bases  MSK: Normal gait, moves all extremities  Psych: Alert and oriented x3, normal affect and mood    Allergies   Allergen Reactions    Bloodless Unspecified     Pt requesting bloodless protocol    [place] Oil Hives and Rash     Other reaction(s): Rash, urticarial      Cat Hair Extract Itching    Kiwi Extract      Other reaction(s): Lip Swellen  Other reaction(s): Lip Swellen         [facility] [address] [address]  Phone: [phone] Fax: [phone]    Current Outpatient Medications   Medication Sig Dispense Refill    predniSONE (DELTASONE) 10 MG Tab Take 4 Tablets by mouth every day for 5 days. 20 Tablet 0    ipratropium (ATROVENT) 0.03 % Solution Take 1 spray in each nostril up to 3 times a day as needed for nasal drip 30 mL 5    " fluticasone-umeclidinium-vilanterol (TRELEGY ELLIPTA) 100-62.5-25 mcg/act inhaler Take 1 inhalation blister once a day 60 Each 11    escitalopram (LEXAPRO) 10 MG Tab Take 1 tablet by mouth every day. 30 Tablet 5    fluconazole (DIFLUCAN) 150 MG tablet Take 1 Tablet by mouth every day. 1 Tablet 0    traZODone (DESYREL) 100 MG Tab Take 0.5-1 Tablets by mouth at bedtime as needed for Sleep. 30 Tablet 3    HADLIMA PUSHTOUCH 40 MG/0.4ML Solution Auto-injector       montelukast (SINGULAIR) 10 MG Tab Take 1 Tablet by mouth every day. 30 Tablet 5    QVAR REDIHALER 80 MCG/ACT inhaler Inhale 2 Puffs 2 times a day. 10.6 g 5    metronidazole (METROGEL) 0.75 % gel Apply topically to the affected area on the left part of the back 45 g 4    Miconazole Nitrate (LOTRIMIN AF) 2 % Aerosol Apply 1 Application topically 2 times a day. 150 g 2    ASHWAGANDHA PO Take  by mouth.      polyethylene glycol 3350 (MIRALAX) 17 GM/SCOOP Powder Mix 17 g (1 capful) with liquid and take by mouth every day. 510 g 3    Sennosides (SENNA) 8.6 MG Tab Take 1-2 tablets by mouth at bedtime as needed (constipation). 30 Tablet 0    triamcinolone acetonide (KENALOG) 0.1 % Cream Apply thin layer to rash on neck twice daily as needed 30 g 1     No current facility-administered medications for this visit.       Assessment & Plan    43 y.o. female with the following -     1. Moderate persistent asthma with acute exacerbation  - Acute on chronic condition; patient with acute exacerbation based on history and presentation. Likely triggered by medication non-adherence  Recommend:  - Empiric rx sent for OCS to prevent future attacks with: predniSONE (DELTASONE) 10 MG Tab; Take 4 Tablets by mouth every day for 5 days.  Dispense: 20 Tablet; Refill: 0  - Continue with maintenance inhalers including recently started Atrovent and Trelegy  - Educated on exacerbation signs, return precautions, and when to contact clinic or come in to ER.   - continue treatment for  allergies    2. Anxiety  - Chronic condition; uncontrolled. Reviewed treatment options including medication and counseling/therapy, pet therapy  - Cognitive behavioral therapy (CBT) is an effective treatment for MICKEY as are SSRIs.  SSRIs usually take 4-6 wks to titrate to have an effect   - Counseling for stress mgmt techniques - consult placed to Behavioral Health  - Informed the patient of [employer] and the resources that are available  - Discussed potential to increase Lexapro and encouraged patient to discuss with PCP at next visit  - Pt to call/message in one week to report intolerance of meds and any changes in mood /symptoms  - F/u with PCP in four weeks or sooner as needed  - ER for SI/SA/HI    3. Class 3 Severe Morbid obesity (HCC)  Long discussion with patient about treatment strategies and goals.    - Goal of 10% weight loss over 6 months can  cardiovascular risk by up to 25%.  - Discussed importance of healthy diet, particularly whole food, plant based diet to help with weight loss.    - Discussed importance of regular exercise (5x/week, 20-30 minutes of sustained cardiovascular training)  - Additional resources and recommendations sent via secure messaging  - Weight loss medication can be considered if no progress evident following lifestyle measures as above for at least 3-6 months   - Referral placed to surgery for consideration of bariatric surgery options, wei. with cormorbidities  - Pt verbalized understanding and acknowledged treatment plan.      Return in about 4 weeks (around 2024) for chronic condition follow up.    Please note that this dictation was created using voice recognition software. I have made every reasonable attempt to correct obvious errors, but I expect that there are errors of grammar and possibly content that I did not discover before finalizing the note.

## 2024-05-16 ENCOUNTER — PATIENT MESSAGE (OUTPATIENT)
Dept: MEDICAL GROUP | Facility: MEDICAL CENTER | Age: 44
End: 2024-05-16
Payer: MEDICAID

## 2024-05-16 DIAGNOSIS — F41.9 ANXIETY: ICD-10-CM

## 2024-05-16 DIAGNOSIS — F33.2 SEVERE EPISODE OF RECURRENT MAJOR DEPRESSIVE DISORDER, WITHOUT PSYCHOTIC FEATURES (HCC): ICD-10-CM

## 2024-05-16 PROCEDURE — RXMED WILLOW AMBULATORY MEDICATION CHARGE: Performed by: INTERNAL MEDICINE

## 2024-05-16 PROCEDURE — RXMED WILLOW AMBULATORY MEDICATION CHARGE: Performed by: FAMILY MEDICINE

## 2024-05-21 ENCOUNTER — PHARMACY VISIT (OUTPATIENT)
Dept: PHARMACY | Facility: MEDICAL CENTER | Age: 44
End: 2024-05-21
Payer: COMMERCIAL

## 2024-05-28 ENCOUNTER — PHARMACY VISIT (OUTPATIENT)
Dept: PHARMACY | Facility: MEDICAL CENTER | Age: 44
End: 2024-05-28
Payer: COMMERCIAL

## 2024-05-28 ENCOUNTER — OFFICE VISIT (OUTPATIENT)
Dept: MEDICAL GROUP | Facility: MEDICAL CENTER | Age: 44
End: 2024-05-28
Attending: NURSE PRACTITIONER
Payer: MEDICAID

## 2024-05-28 VITALS
OXYGEN SATURATION: 96 % | HEART RATE: 85 BPM | TEMPERATURE: 96.5 F | BODY MASS INDEX: 58.33 KG/M2 | RESPIRATION RATE: 16 BRPM | DIASTOLIC BLOOD PRESSURE: 80 MMHG | WEIGHT: 293 LBS | SYSTOLIC BLOOD PRESSURE: 132 MMHG

## 2024-05-28 DIAGNOSIS — F41.9 ANXIETY: ICD-10-CM

## 2024-05-28 DIAGNOSIS — R06.2 WHEEZING: ICD-10-CM

## 2024-05-28 DIAGNOSIS — E66.01 CLASS 3 SEVERE OBESITY DUE TO EXCESS CALORIES WITH SERIOUS COMORBIDITY AND BODY MASS INDEX (BMI) OF 50.0 TO 59.9 IN ADULT (HCC): ICD-10-CM

## 2024-05-28 DIAGNOSIS — L73.2 HIDRADENITIS: ICD-10-CM

## 2024-05-28 LAB
HBA1C MFR BLD: 5.6 % (ref ?–5.8)
POCT INT CON NEG: NEGATIVE
POCT INT CON POS: POSITIVE

## 2024-05-28 PROCEDURE — RXMED WILLOW AMBULATORY MEDICATION CHARGE: Performed by: NURSE PRACTITIONER

## 2024-05-28 PROCEDURE — 99214 OFFICE O/P EST MOD 30 MIN: CPT | Performed by: NURSE PRACTITIONER

## 2024-05-28 PROCEDURE — 3079F DIAST BP 80-89 MM HG: CPT | Performed by: NURSE PRACTITIONER

## 2024-05-28 PROCEDURE — 3075F SYST BP GE 130 - 139MM HG: CPT | Performed by: NURSE PRACTITIONER

## 2024-05-28 RX ORDER — LIDOCAINE HYDROCHLORIDE 20 MG/ML
1 JELLY TOPICAL 3 TIMES DAILY PRN
Qty: 30 ML | Refills: 3 | Status: SHIPPED | OUTPATIENT
Start: 2024-05-28

## 2024-05-28 RX ORDER — CLINDAMYCIN PHOSPHATE 11.9 MG/ML
1 SOLUTION TOPICAL 2 TIMES DAILY
Qty: 60 ML | Refills: 5 | Status: SHIPPED | OUTPATIENT
Start: 2024-05-28

## 2024-05-28 RX ORDER — IPRATROPIUM BROMIDE 21 UG/1
SPRAY, METERED NASAL
Qty: 30 ML | Refills: 5 | Status: SHIPPED | OUTPATIENT
Start: 2024-05-28

## 2024-05-28 ASSESSMENT — PATIENT HEALTH QUESTIONNAIRE - PHQ9
1. LITTLE INTEREST OR PLEASURE IN DOING THINGS: NEARLY EVERY DAY
8. MOVING OR SPEAKING SO SLOWLY THAT OTHER PEOPLE COULD HAVE NOTICED. OR THE OPPOSITE, BEING SO FIGETY OR RESTLESS THAT YOU HAVE BEEN MOVING AROUND A LOT MORE THAN USUAL: NEARLY EVERY DAY
6. FEELING BAD ABOUT YOURSELF - OR THAT YOU ARE A FAILURE OR HAVE LET YOURSELF OR YOUR FAMILY DOWN: NEARLY EVERY DAY
4. FEELING TIRED OR HAVING LITTLE ENERGY: MORE THAN HALF THE DAYS
5. POOR APPETITE OR OVEREATING: NEARLY EVERY DAY
SUM OF ALL RESPONSES TO PHQ QUESTIONS 1-9: 24
9. THOUGHTS THAT YOU WOULD BE BETTER OFF DEAD, OR OF HURTING YOURSELF: SEVERAL DAYS
2. FEELING DOWN, DEPRESSED, IRRITABLE, OR HOPELESS: NEARLY EVERY DAY
3. TROUBLE FALLING OR STAYING ASLEEP OR SLEEPING TOO MUCH: NEARLY EVERY DAY
SUM OF ALL RESPONSES TO PHQ9 QUESTIONS 1 AND 2: 6
7. TROUBLE CONCENTRATING ON THINGS, SUCH AS READING THE NEWSPAPER OR WATCHING TELEVISION: NEARLY EVERY DAY

## 2024-05-28 ASSESSMENT — ANXIETY QUESTIONNAIRES
5. BEING SO RESTLESS THAT IT IS HARD TO SIT STILL: MORE THAN HALF THE DAYS
4. TROUBLE RELAXING: NEARLY EVERY DAY
7. FEELING AFRAID AS IF SOMETHING AWFUL MIGHT HAPPEN: NEARLY EVERY DAY
2. NOT BEING ABLE TO STOP OR CONTROL WORRYING: NEARLY EVERY DAY
GAD7 TOTAL SCORE: 20
3. WORRYING TOO MUCH ABOUT DIFFERENT THINGS: NEARLY EVERY DAY
6. BECOMING EASILY ANNOYED OR IRRITABLE: NEARLY EVERY DAY
1. FEELING NERVOUS, ANXIOUS, OR ON EDGE: NEARLY EVERY DAY

## 2024-05-28 ASSESSMENT — FIBROSIS 4 INDEX: FIB4 SCORE: .79811826505878606

## 2024-05-31 NOTE — PROGRESS NOTES
Verbal consent was acquired by the patient to use [employer] ambient listening note generation during this visit     Chief Complaint   Patient presents with    Cyst       Subjective:     HPI:   History of Present Illness  The patient presents for evaluation of multiple medical concerns.    The patient expresses a desire to explore potential treatment options, including prednisone, diabetes testing, and potential eligibility for Ozempic or Wegovy.    The patient's primary concern is a painful cyst, which has been present for an extended period. The cyst, located at the top of her thigh, has ruptured due to leakage, causing significant discomfort upon palpation. She has a history of hidradenitis suppurativa, which began at the age of 10. The initial cyst, located underneath her arm, was the size of a golf ball, leading to gangrene at the opening. This was accompanied by green pus and black discharge from her arm. She sought treatment at a small clinic in [place], [place], where the cyst was lanced and packed without numbing medication. 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 By the time she was 19, the cyst continued to rupture in the same area. The cyst was surgically removed from the armpit, along with sweat glands and lymph nodes. The primary reason for the cyst removal was a cyst that became trapped underneath the scar tissue, leading to bloody discharge through her chest, nipple area, and altered skin texture. She lived in [place] from 2018 to 2019, during which her skin texture changed. However, she began developing cysts on her backside and around her stomach, a symptom she had not previously encountered. During her job, she was exposed to obstacles, which resulted in bleeding through her clothes, large holes, and large clots. She required special medical admin pats to prevent bleeding. She was unable to sit down, sleep, or lie on her  side, and when she stood up, she would bleed through the pads. Once she transitioned to a drier place, the cyst improved, but it still develops, especially if something rubs against each other or touches.    The patient has been experiencing issues with her asthma, particularly during sleep. She reports excessive mucus production in her chest, accompanied by significant coughing. Initially, she attributed this to allergies or seasonal allergies, but her symptoms persisted. She also experiences large clogs in the back of her throat, which she attributes to a previous surgery performed by an ENT specialist. She has not yet consulted her dermatologist. She has attempted to use inhalers, including Brezata, Trelegy, and Advair, but these have not provided relief. She experiences joint pain at the base of her feet, shoulders, and elbow joints, which impedes her ability to stand up, sit down, or elevate her arms above her head. She is currently not taking Atrovent and is only taking QVAR 80. She takes LIMA every Wednesday and has 2 injections left. She took leftover prednisone due to excessive coughing and choking. She has made dietary changes, including eliminating energy drinks and processed foods, which have improved her reflux. However, she experiences difficulty breathing during ambulation.    The patient reports severe depression and anxiety, which have been causing her to lose jobs. She has difficulty returning to work and interacting with others, or experiencing pain and fear of a cyst. She is unable to take showers due to her symptoms. She is unable to see Kim Barrera, a therapist at [facility] due to relocation. She has been experiencing manic issues for the past 2 weeks, including difficulty sleeping, anxiety, and stress. She is currently taking Lexapro, but is concerned about potential weight gain. She reports tachycardia, particularly upon standing, and can hear a beat in her ears. She does not take  albuterol due to its side effects.        No problems updated.    ROS  See HPI     Allergies   Allergen Reactions    Bloodless Unspecified     Pt requesting bloodless protocol    Temple Oil Hives and Rash     Other reaction(s): Rash, urticarial      Cat Hair Extract Itching    Kiwi Extract      Other reaction(s): Lip Swellen  Other reaction(s): Lip Swellen         Current medicines (including changes today)  Current Outpatient Medications   Medication Sig Dispense Refill    ipratropium (ATROVENT) 0.03 % Solution Take 1 spray in each nostril up to 3 times a day as needed for nasal drip 30 mL 5    clindamycin (CLEOCIN) 1 % Solution Apply 1 application topically 2 times a day. Apply to areas with cysts 60 mL 5    lidocaine 2 % Gel Apply 1 Application topically 3 times a day as needed (pain with cysts). 30 mL 3    fluticasone-umeclidinium-vilanterol (TRELEGY ELLIPTA) 100-62.5-25 mcg/act inhaler Take 1 inhalation blister once a day 60 Each 11    escitalopram (LEXAPRO) 10 MG Tab Take 1 tablet by mouth every day. 30 Tablet 5    fluconazole (DIFLUCAN) 150 MG tablet Take 1 Tablet by mouth every day. 1 Tablet 0    traZODone (DESYREL) 100 MG Tab Take 0.5-1 Tablets by mouth at bedtime as needed for Sleep. 30 Tablet 3    HADLIMA PUSHTOUCH 40 MG/0.4ML Solution Auto-injector       montelukast (SINGULAIR) 10 MG Tab Take 1 Tablet by mouth every day. 30 Tablet 5    QVAR REDIHALER 80 MCG/ACT inhaler Inhale 2 Puffs 2 times a day. 10.6 g 5    metronidazole (METROGEL) 0.75 % gel Apply topically to the affected area on the left part of the back 45 g 4    Miconazole Nitrate (LOTRIMIN AF) 2 % Aerosol Apply 1 Application topically 2 times a day. 150 g 2    ASHWAGANDHA PO Take  by mouth.      polyethylene glycol 3350 (MIRALAX) 17 GM/SCOOP Powder Mix 17 g (1 capful) with liquid and take by mouth every day. 510 g 3    Sennosides (SENNA) 8.6 MG Tab Take 1-2 tablets by mouth at bedtime as needed (constipation). 30 Tablet 0    triamcinolone  acetonide (KENALOG) 0.1 % Cream Apply thin layer to rash on neck twice daily as needed 30 g 1     No current facility-administered medications for this visit.       Social History     Tobacco Use    Smoking status: Never    Smokeless tobacco: Never   Vaping Use    Vaping status: Never Used   Substance Use Topics    Alcohol use: Yes     Comment: rarely    Drug use: Never       Patient Active Problem List    Diagnosis Date Noted    Prediabetes 11/07/2023    Primary insomnia 09/28/2023    Anxiety 05/18/2023    Constipation 02/08/2023    Early menopause occurring in patient age younger than 45 years 10/06/2022    History of pituitary macroadenoma (HCC) 08/11/2022    Moderate persistent asthma with acute exacerbation 06/30/2022    Eosinophilia 06/30/2022    Hidradenitis 06/09/2022    Environmental allergies 06/09/2022    Gastroesophageal reflux disease without esophagitis 06/09/2022    Severe episode of recurrent major depressive disorder, without psychotic features ([facility]) 06/09/2022    Primary hypertension 03/24/2022    Chronic sinusitis 03/09/2022    Pulmonary air trapping 03/09/2022    Iron deficiency anemia 03/09/2022    Class 3 severe obesity due to excess calories with serious comorbidity and body mass index (BMI) of 50.0 to 59.9 in adult ([facility]) 08/12/2015       Family History   Problem Relation Age of Onset    Hypertension Mother     Diabetes Mother     Cancer Mother 40        thyroid    Stroke Mother     Hypertension Father     Breast Cancer Sister 48    Heart Disease Neg Hx           Objective:     /80 (BP Location: Left arm, Patient Position: Sitting, BP Cuff Size: Adult)   Pulse 85   Temp 35.8 °C (96.5 °F) (Temporal)   Resp 16   Wt (!) 184 kg (406 lb 8 oz)   SpO2 96%  Body mass index is 58.33 kg/m².    Physical Exam:  Physical Exam  Vitals reviewed.   Constitutional:       General: She is awake.      Appearance: Normal appearance. She is well-developed. She is obese.   HENT:      Head: Normocephalic.    Eyes:      Conjunctiva/sclera: Conjunctivae normal.   Cardiovascular:      Rate and Rhythm: Normal rate.   Pulmonary:      Effort: Pulmonary effort is normal. No respiratory distress.   Musculoskeletal:      Cervical back: Neck supple.   Skin:     General: Skin is warm and dry.   Neurological:      Mental Status: She is alert and oriented to person, place, and time.   Psychiatric:         Mood and Affect: Mood normal.         Behavior: Behavior normal. Behavior is cooperative.              Assessment and Plan:     The following treatment plan was discussed:    Problem List Items Addressed This Visit       Hidradenitis    Relevant Medications    clindamycin (CLEOCIN) 1 % Solution    lidocaine 2 % Gel    Class 3 severe obesity due to excess calories with serious comorbidity and body mass index (BMI) of 50.0 to 59.9 in adult (HCC)    Relevant Orders    POCT Hemoglobin A1C (Completed)    Referral to Bariatric Surgery    Anxiety    Relevant Orders    Referral to Psychiatry     Other Visit Diagnoses       Wheezing        Relevant Medications    ipratropium (ATROVENT) 0.03 % Solution            Assessment & Plan  1. Hidradenitis suppurativa.  The patient's cysts are slightly red, but do not exhibit signs of infection. The cyst on her leg has a small hole that is draining slightly. Lidocaine was prescribed to be  applied to numb the area. A 4 x 4 was applied to the affected area. The patient was advised to consult her dermatologist. A prescription for clindamycin wash was issued.    2. Asthma.  The patient's wheezing is minimal. The patient was advised to resume Atrovent.    3. Depression and anxiety.  A referral to a new psychiatrist was issued.    4. Weight management.  The patient does not qualify for Ozempic as her blood sugar levels are within normal range. The patient was advised to consult with Dr. Jordan's office for a nonsurgical weight loss program.    Any change or worsening of signs or symptoms, patient  encouraged to follow-up or report to emergency room for further evaluation. Patient verbalizes understanding and agrees.      PLEASE NOTE: This dictation was created using voice recognition software. I have made every reasonable attempt to correct obvious errors, but I expect that there are errors of grammar and possibly content that I did not discover before finalizing the note.

## 2024-06-10 PROCEDURE — RXMED WILLOW AMBULATORY MEDICATION CHARGE

## 2024-06-11 ENCOUNTER — PHARMACY VISIT (OUTPATIENT)
Dept: PHARMACY | Facility: MEDICAL CENTER | Age: 44
End: 2024-06-11
Payer: COMMERCIAL

## 2024-06-12 ENCOUNTER — OFFICE VISIT (OUTPATIENT)
Dept: MEDICAL GROUP | Facility: MEDICAL CENTER | Age: 44
End: 2024-06-12
Attending: INTERNAL MEDICINE
Payer: MEDICAID

## 2024-06-12 ENCOUNTER — PHARMACY VISIT (OUTPATIENT)
Dept: PHARMACY | Facility: MEDICAL CENTER | Age: 44
End: 2024-06-12
Payer: COMMERCIAL

## 2024-06-12 VITALS
OXYGEN SATURATION: 96 % | RESPIRATION RATE: 16 BRPM | BODY MASS INDEX: 58.47 KG/M2 | WEIGHT: 293 LBS | SYSTOLIC BLOOD PRESSURE: 118 MMHG | TEMPERATURE: 97.6 F | DIASTOLIC BLOOD PRESSURE: 82 MMHG | HEART RATE: 92 BPM

## 2024-06-12 DIAGNOSIS — F33.2 SEVERE EPISODE OF RECURRENT MAJOR DEPRESSIVE DISORDER, WITHOUT PSYCHOTIC FEATURES (HCC): ICD-10-CM

## 2024-06-12 DIAGNOSIS — B37.2 CUTANEOUS CANDIDIASIS: ICD-10-CM

## 2024-06-12 DIAGNOSIS — E66.01 CLASS 3 SEVERE OBESITY DUE TO EXCESS CALORIES WITH SERIOUS COMORBIDITY AND BODY MASS INDEX (BMI) OF 50.0 TO 59.9 IN ADULT (HCC): ICD-10-CM

## 2024-06-12 DIAGNOSIS — L73.2 HIDRADENITIS: ICD-10-CM

## 2024-06-12 PROCEDURE — 3079F DIAST BP 80-89 MM HG: CPT | Performed by: INTERNAL MEDICINE

## 2024-06-12 PROCEDURE — 99212 OFFICE O/P EST SF 10 MIN: CPT | Performed by: INTERNAL MEDICINE

## 2024-06-12 PROCEDURE — RXMED WILLOW AMBULATORY MEDICATION CHARGE: Performed by: NURSE PRACTITIONER

## 2024-06-12 PROCEDURE — 3074F SYST BP LT 130 MM HG: CPT | Performed by: INTERNAL MEDICINE

## 2024-06-12 PROCEDURE — RXMED WILLOW AMBULATORY MEDICATION CHARGE: Performed by: INTERNAL MEDICINE

## 2024-06-12 PROCEDURE — 99214 OFFICE O/P EST MOD 30 MIN: CPT | Performed by: INTERNAL MEDICINE

## 2024-06-12 RX ORDER — SULFAMETHOXAZOLE AND TRIMETHOPRIM 800; 160 MG/1; MG/1
1 TABLET ORAL 2 TIMES DAILY
Qty: 10 TABLET | Refills: 0 | Status: SHIPPED | OUTPATIENT
Start: 2024-06-12 | End: 2024-06-17

## 2024-06-12 RX ORDER — CHLORHEXIDINE GLUCONATE 40 MG/ML
SOLUTION TOPICAL
Qty: 946 ML | Refills: 3 | Status: SHIPPED | OUTPATIENT
Start: 2024-06-12

## 2024-06-12 RX ORDER — NYSTATIN 100000 [USP'U]/G
1 POWDER TOPICAL 3 TIMES DAILY
Qty: 60 G | Refills: 0 | Status: SHIPPED | OUTPATIENT
Start: 2024-06-12

## 2024-06-12 ASSESSMENT — FIBROSIS 4 INDEX: FIB4 SCORE: .79811826505878606

## 2024-06-12 NOTE — PROGRESS NOTES
Subjective:   Ernesto Ramos is a 43 y.o. female here today for rash on chest, abscesses    Cutaneous candidiasis  She reports a rash between her breasts that started in April.  She thinks it has gotten significantly worse since then.  It is not particularly painful or itchy but it is somewhat bothersome.  Initially she tried Lotrimin cream and then started applying blue Star ointment.  She was also using astringent pads to wipe off the blue Star ointment and states that part of her skin was peeling off when she was doing this.  States that this is a new kind of rash that she has never had before.  She has not talked to her dermatologist about it.    Hidradenitis  She has been following with dermatology and she started on Hadlima in January.  She used it through May but she stopped it because she did not feel like it was helping at all with her hidradenitis outbreaks.  She currently has several painful abscesses on her lower pannus.  States that she used a razor blade that she sterilized at home to open them up and that they have been draining for about 2 weeks.  On the left she has 2 areas that are particularly painful.  It is difficult for her to see these areas or to know how much they are changing given body habitus.      Severe episode of recurrent major depressive disorder, without psychotic features (HCC)  She has now started seeing psychiatry.  Was given prescription for propranolol, doxepin, and lamotrigine in addition to the venlafaxine.  She has not started these medications yet.  She has a follow-up with her psychiatrist in 2 weeks.    Class 3 severe obesity due to excess calories with serious comorbidity and body mass index (BMI) of 50.0 to 59.9 in adult (HCC)  She has a follow-up with Dr. Hudson's office next week to discuss nonsurgical weight loss options.       Current medicines (including changes today)  Current Outpatient Medications   Medication Sig Dispense Refill    nystatin (MYCOSTATIN)  powder Apply 1 g topically 3 times a day. 60 g 0    sulfamethoxazole-trimethoprim (BACTRIM DS) 800-160 MG tablet Take 1 Tablet by mouth 2 times a day for 5 days. 10 Tablet 0    chlorhexidine (HIBICLENS) 4 % solution Use to clean around areas of hydradenitis daily as needed 946 mL 3    doxepin (SINEQUAN) 25 MG Cap Take 1 capsule by mouth every bedtime as needed for insomnia 30 Capsule 0    lamoTRIgine (LAMICTAL) 25 MG Tab Take 1 tablet by mouth once daily for 14 days, then 1 tablet twice daily thereafter 60 Tablet 0    propranolol (INDERAL) 10 MG Tab Take 1/2 tablet by mouth for 5 days, then increase to 1 tablet daily 30 Tablet 0    ipratropium (ATROVENT) 0.03 % Solution Take 1 spray in each nostril up to 3 times a day as needed for nasal drip 30 mL 5    clindamycin (CLEOCIN) 1 % Solution Apply 1 application topically 2 times a day. Apply to areas with cysts 60 mL 5    lidocaine 2 % Gel Apply 1 Application topically 3 times a day as needed (pain with cysts). 30 mL 3    escitalopram (LEXAPRO) 10 MG Tab Take 1 tablet by mouth every day. 30 Tablet 5    montelukast (SINGULAIR) 10 MG Tab Take 1 Tablet by mouth every day. 30 Tablet 5    QVAR REDIHALER 80 MCG/ACT inhaler Inhale 2 Puffs 2 times a day. 10.6 g 5    metronidazole (METROGEL) 0.75 % gel Apply topically to the affected area on the left part of the back 45 g 4     No current facility-administered medications for this visit.     She  has a past medical history of Allergy, Anemia (2018), Anesthesia (07/28/2022), Asthma (07/28/2022), Bowel habit changes (07/28/2022), Breath shortness (07/28/2022), Chronic sinusitis, Disorder of thyroid (07/28/2022), GERD (gastroesophageal reflux disease), Head ache (07/28/2022), Heart burn (07/28/2022), Hydradenitis, Hydradenitis, Obesity, Psychiatric problem (07/28/2022), and Snoring (07/28/2022).         Objective:     Vitals:    06/12/24 0843   BP: 118/82   Pulse: 92   Resp: 16   Temp: 36.4 °C (97.6 °F)   SpO2: 96%     Body mass  index is 58.47 kg/m².   Physical Exam:  Constitutional: Alert, no distress.  Skin: Warm, dry, good turgor, hyperpigmented shiny rash consistent with cutaneous candidiasis visualized beneath breasts and between breasts bilaterally, approximately 2 cm red indurated region over left lower pannus with purulent drainage but no fluctuance, approximately 1 cm indurated red draining area just inferior lateral to this location also draining small amount of purulent material but no fluctuance.  Both areas are tender to palpation.  Eye: Equal, round and reactive, conjunctiva clear, lids normal.  Psych: Alert and oriented x3, normal affect and mood.    Assessment and Plan:   The following treatment plan was discussed    1. Cutaneous candidiasis  We discussed trial of nystatin powder, keeping the area clean and dry.  If no improvement in the next week she will let me know via MyChart  - nystatin (MYCOSTATIN) powder; Apply 1 g topically 3 times a day.  Dispense: 60 g; Refill: 0    2. Hidradenitis  Appears to have developed some cellulitis surrounding 2 of her abscesses therefore discussed treatment with Bactrim.  She also requested prescription for Hibiclens which was provided.  - sulfamethoxazole-trimethoprim (BACTRIM DS) 800-160 MG tablet; Take 1 Tablet by mouth 2 times a day for 5 days.  Dispense: 10 Tablet; Refill: 0  - chlorhexidine (HIBICLENS) 4 % solution; Use to clean around areas of hydradenitis daily as needed  Dispense: 946 mL; Refill: 3    3. Severe episode of recurrent major depressive disorder, without psychotic features (HCC)  She will start on her new medications from psychiatry and continue close follow-up with psychiatry    4. Class 3 severe obesity due to excess calories with serious comorbidity and body mass index (BMI) of 50.0 to 59.9 in adult (HCC)  She will follow-up with Dr. Hudson's nonsurgical weight loss clinic on Monday        Followup: Return if symptoms worsen or fail to improve.

## 2024-06-12 NOTE — ASSESSMENT & PLAN NOTE
She has been following with dermatology and she started on Hadlima in January.  She used it through May but she stopped it because she did not feel like it was helping at all with her hidradenitis outbreaks.  She currently has several painful abscesses on her lower pannus.  States that she used a razor blade that she sterilized at home to open them up and that they have been draining for about 2 weeks.  On the left she has 2 areas that are particularly painful.  It is difficult for her to see these areas or to know how much they are changing given body habitus.

## 2024-06-12 NOTE — ASSESSMENT & PLAN NOTE
She reports a rash between her breasts that started in April.  She thinks it has gotten significantly worse since then.  It is not particularly painful or itchy but it is somewhat bothersome.  Initially she tried Lotrimin cream and then started applying blue Star ointment.  She was also using astringent pads to wipe off the blue Star ointment and states that part of her skin was peeling off when she was doing this.  States that this is a new kind of rash that she has never had before.  She has not talked to her dermatologist about it.

## 2024-06-12 NOTE — ASSESSMENT & PLAN NOTE
She has a follow-up with Dr. Hudson's office next week to discuss nonsurgical weight loss options.

## 2024-06-12 NOTE — ASSESSMENT & PLAN NOTE
She has now started seeing psychiatry.  Was given prescription for propranolol, doxepin, and lamotrigine in addition to the venlafaxine.  She has not started these medications yet.  She has a follow-up with her psychiatrist in 2 weeks.

## 2024-06-16 PROCEDURE — RXMED WILLOW AMBULATORY MEDICATION CHARGE: Performed by: INTERNAL MEDICINE

## 2024-06-17 ENCOUNTER — PHARMACY VISIT (OUTPATIENT)
Dept: PHARMACY | Facility: MEDICAL CENTER | Age: 44
End: 2024-06-17
Payer: COMMERCIAL

## 2024-06-17 PROCEDURE — RXOTC WILLOW AMBULATORY OTC CHARGE

## 2024-06-26 PROCEDURE — RXMED WILLOW AMBULATORY MEDICATION CHARGE

## 2024-06-27 ENCOUNTER — PHARMACY VISIT (OUTPATIENT)
Dept: PHARMACY | Facility: MEDICAL CENTER | Age: 44
End: 2024-06-27
Payer: COMMERCIAL

## 2024-07-08 PROCEDURE — RXMED WILLOW AMBULATORY MEDICATION CHARGE

## 2024-07-09 ENCOUNTER — OFFICE VISIT (OUTPATIENT)
Dept: MEDICAL GROUP | Facility: MEDICAL CENTER | Age: 44
End: 2024-07-09
Attending: INTERNAL MEDICINE
Payer: MEDICAID

## 2024-07-09 VITALS
HEART RATE: 78 BPM | OXYGEN SATURATION: 98 % | DIASTOLIC BLOOD PRESSURE: 80 MMHG | RESPIRATION RATE: 16 BRPM | WEIGHT: 293 LBS | SYSTOLIC BLOOD PRESSURE: 120 MMHG | HEIGHT: 69 IN | TEMPERATURE: 97.6 F | BODY MASS INDEX: 43.4 KG/M2

## 2024-07-09 DIAGNOSIS — Z78.9 TRANSGENDER: ICD-10-CM

## 2024-07-09 DIAGNOSIS — E66.01 CLASS 3 SEVERE OBESITY DUE TO EXCESS CALORIES WITH SERIOUS COMORBIDITY AND BODY MASS INDEX (BMI) OF 50.0 TO 59.9 IN ADULT (HCC): ICD-10-CM

## 2024-07-09 DIAGNOSIS — R21 RASH: ICD-10-CM

## 2024-07-09 PROCEDURE — 99213 OFFICE O/P EST LOW 20 MIN: CPT | Performed by: INTERNAL MEDICINE

## 2024-07-09 ASSESSMENT — FIBROSIS 4 INDEX: FIB4 SCORE: .79811826505878606

## 2024-07-10 ENCOUNTER — PHARMACY VISIT (OUTPATIENT)
Dept: PHARMACY | Facility: MEDICAL CENTER | Age: 44
End: 2024-07-10
Payer: COMMERCIAL

## 2024-07-10 ENCOUNTER — HOSPITAL ENCOUNTER (OUTPATIENT)
Facility: MEDICAL CENTER | Age: 44
End: 2024-07-10
Attending: INTERNAL MEDICINE
Payer: MEDICAID

## 2024-07-10 ENCOUNTER — OFFICE VISIT (OUTPATIENT)
Dept: MEDICAL GROUP | Facility: MEDICAL CENTER | Age: 44
End: 2024-07-10
Attending: INTERNAL MEDICINE
Payer: MEDICAID

## 2024-07-10 VITALS
RESPIRATION RATE: 16 BRPM | OXYGEN SATURATION: 97 % | HEART RATE: 97 BPM | SYSTOLIC BLOOD PRESSURE: 110 MMHG | TEMPERATURE: 95.6 F | HEIGHT: 70 IN | DIASTOLIC BLOOD PRESSURE: 60 MMHG | BODY MASS INDEX: 41.95 KG/M2 | WEIGHT: 293 LBS

## 2024-07-10 DIAGNOSIS — R21 RASH: ICD-10-CM

## 2024-07-10 DIAGNOSIS — J45.41 MODERATE PERSISTENT ASTHMA WITH ACUTE EXACERBATION: ICD-10-CM

## 2024-07-10 DIAGNOSIS — Z12.11 SCREEN FOR COLON CANCER: ICD-10-CM

## 2024-07-10 LAB
AMBIGUOUS DTTM AMBI4: NORMAL
PATHOLOGY CONSULT NOTE: NORMAL

## 2024-07-10 PROCEDURE — 3078F DIAST BP <80 MM HG: CPT | Performed by: INTERNAL MEDICINE

## 2024-07-10 PROCEDURE — 99214 OFFICE O/P EST MOD 30 MIN: CPT | Mod: 25 | Performed by: INTERNAL MEDICINE

## 2024-07-10 PROCEDURE — 11104 PUNCH BX SKIN SINGLE LESION: CPT | Performed by: INTERNAL MEDICINE

## 2024-07-10 PROCEDURE — 700101 HCHG RX REV CODE 250: Mod: UD

## 2024-07-10 PROCEDURE — 3074F SYST BP LT 130 MM HG: CPT | Performed by: INTERNAL MEDICINE

## 2024-07-10 PROCEDURE — 99214 OFFICE O/P EST MOD 30 MIN: CPT | Performed by: INTERNAL MEDICINE

## 2024-07-10 PROCEDURE — 11105 PUNCH BX SKIN EA SEP/ADDL: CPT | Performed by: INTERNAL MEDICINE

## 2024-07-10 PROCEDURE — 88312 SPECIAL STAINS GROUP 1: CPT | Mod: 59

## 2024-07-10 PROCEDURE — RXMED WILLOW AMBULATORY MEDICATION CHARGE: Performed by: INTERNAL MEDICINE

## 2024-07-10 PROCEDURE — 88305 TISSUE EXAM BY PATHOLOGIST: CPT | Mod: 59

## 2024-07-10 RX ORDER — PREDNISONE 10 MG/1
10 TABLET ORAL DAILY
Qty: 5 TABLET | Refills: 0 | Status: SHIPPED | OUTPATIENT
Start: 2024-07-10

## 2024-07-10 RX ADMIN — LIDOCAINE HYDROCHLORIDE 10 ML: 10; .005 INJECTION, SOLUTION EPIDURAL; INFILTRATION; INTRACAUDAL; PERINEURAL at 09:43

## 2024-07-10 ASSESSMENT — FIBROSIS 4 INDEX: FIB4 SCORE: .79811826505878606

## 2024-07-12 PROCEDURE — RXMED WILLOW AMBULATORY MEDICATION CHARGE: Performed by: PHYSICIAN ASSISTANT

## 2024-07-19 PROCEDURE — RXMED WILLOW AMBULATORY MEDICATION CHARGE: Performed by: INTERNAL MEDICINE

## 2024-07-19 PROCEDURE — RXMED WILLOW AMBULATORY MEDICATION CHARGE: Performed by: DERMATOLOGY

## 2024-07-19 PROCEDURE — RXMED WILLOW AMBULATORY MEDICATION CHARGE: Performed by: NURSE PRACTITIONER

## 2024-07-22 PROCEDURE — RXMED WILLOW AMBULATORY MEDICATION CHARGE: Performed by: INTERNAL MEDICINE

## 2024-07-23 PROCEDURE — RXMED WILLOW AMBULATORY MEDICATION CHARGE

## 2024-07-24 ENCOUNTER — TELEPHONE (OUTPATIENT)
Dept: MEDICAL GROUP | Facility: MEDICAL CENTER | Age: 44
End: 2024-07-24
Payer: MEDICAID

## 2024-07-24 ENCOUNTER — PHARMACY VISIT (OUTPATIENT)
Dept: PHARMACY | Facility: MEDICAL CENTER | Age: 44
End: 2024-07-24
Payer: COMMERCIAL

## 2024-07-29 PROCEDURE — RXMED WILLOW AMBULATORY MEDICATION CHARGE: Performed by: OTOLARYNGOLOGY

## 2024-08-01 ENCOUNTER — PATIENT MESSAGE (OUTPATIENT)
Dept: MEDICAL GROUP | Facility: MEDICAL CENTER | Age: 44
End: 2024-08-01
Payer: MEDICAID

## 2024-08-02 ENCOUNTER — OFFICE VISIT (OUTPATIENT)
Dept: MEDICAL GROUP | Facility: MEDICAL CENTER | Age: 44
End: 2024-08-02
Payer: MEDICAID

## 2024-08-02 ENCOUNTER — TELEPHONE (OUTPATIENT)
Dept: MEDICAL GROUP | Facility: MEDICAL CENTER | Age: 44
End: 2024-08-02
Payer: MEDICAID

## 2024-08-02 ENCOUNTER — PHARMACY VISIT (OUTPATIENT)
Dept: PHARMACY | Facility: MEDICAL CENTER | Age: 44
End: 2024-08-02
Payer: COMMERCIAL

## 2024-08-02 VITALS
WEIGHT: 293 LBS | TEMPERATURE: 97.6 F | HEIGHT: 69 IN | OXYGEN SATURATION: 96 % | SYSTOLIC BLOOD PRESSURE: 118 MMHG | DIASTOLIC BLOOD PRESSURE: 68 MMHG | RESPIRATION RATE: 18 BRPM | HEART RATE: 95 BPM | BODY MASS INDEX: 43.4 KG/M2

## 2024-08-02 DIAGNOSIS — J01.90 SUBACUTE SINUSITIS, UNSPECIFIED LOCATION: ICD-10-CM

## 2024-08-02 DIAGNOSIS — T14.8XXA OPEN WOUND: ICD-10-CM

## 2024-08-02 DIAGNOSIS — L73.2 HIDRADENITIS: ICD-10-CM

## 2024-08-02 PROCEDURE — RXMED WILLOW AMBULATORY MEDICATION CHARGE

## 2024-08-02 PROCEDURE — 99214 OFFICE O/P EST MOD 30 MIN: CPT

## 2024-08-02 PROCEDURE — 3074F SYST BP LT 130 MM HG: CPT

## 2024-08-02 PROCEDURE — 3078F DIAST BP <80 MM HG: CPT

## 2024-08-02 PROCEDURE — 99213 OFFICE O/P EST LOW 20 MIN: CPT

## 2024-08-02 RX ORDER — FLUCONAZOLE 150 MG/1
150 TABLET ORAL DAILY
Qty: 1 TABLET | Refills: 0 | Status: SHIPPED | OUTPATIENT
Start: 2024-08-02 | End: 2024-08-15

## 2024-08-02 ASSESSMENT — FIBROSIS 4 INDEX: FIB4 SCORE: 0.82

## 2024-08-02 NOTE — ASSESSMENT & PLAN NOTE
Patient presents today with 3 open wounds. Left lower abdomen, left upper thigh, and inside the left gluteal crease. She reports daily drainage with a foul odor. Denies any fevers. She was last seen by dermatology in March.

## 2024-08-02 NOTE — TELEPHONE ENCOUNTER
Phone Number Called:  ([facility]opes)    Call outcome: Left detailed message for patient. Informed to call back with any additional questions.    Message: Called and left voicemail to get most recent lab work faxed over from them to us. Gave out phone and fax numbers

## 2024-08-02 NOTE — PROGRESS NOTES
6 6 kamla her legs has got up to [address] for day trip is outside this is a great procedure recall around.  Make sure you get there early) conservative verbal consent was acquired by the patient to use [employer] ambient listening note generation during this visit     Subjective:     CC:  Diagnoses of Open wound, Hidradenitis, and Subacute sinusitis, unspecified location were pertinent to this visit.    HISTORY OF THE PRESENT ILLNESS: Patient is a 44 y.o. female.     History of Present Illness  The patient presents for evaluation of multiple medical concerns.    The patient reports experiencing severe flare-ups of hidradenitis suppurativa, characterized by bleeding. The etiology of the bleeding remains uncertain, as she now experiences it in the lower parts of her body. The condition, which initially resolved spontaneously, has recently worsened, characterized by drainage and drainage at the back of her throat. Upon rising, it takes an extended period to expel the drainage, although she can still taste and smell it. She denies experiencing fevers, changes in vision, or persistent coughing. She occasionally experiences headaches and a sore throat. She has a history of severe allergies, for which she keeps the fan on in her room. Despite being prescribed doxycycline by her ENT, she has not yet commenced it. She contacted her ENT specialist due to a sensation of compaction on one side, with pus draining. Her ENT appointment is scheduled for 10/2024. Post-surgery, she underwent brain tumor surgery, which extended to her nasal passage, necessitating sinus scraping, turbins, and deviated septum repair. Since then, she has been experiencing recurrent sinus infections.    The hidradenitis suppurativa has resulted in two healing lesions. The lesion on her thigh led to the contact of her stomach, resulting in a growth on her stomach. The lesion has been draining for approximately 3 months. Additionally, she has 3 or  4 lesions on her buttocks, with 5 active lesions currently. Dr. Houser has previously evaluated these lesions, although she has not recently consulted with him.    The patient sought care at [place] due to her symptoms. A blood work was conducted, revealing nucleated red blood cells and severe anemia. She was diagnosed with anemia in 2018, which was successfully treated with iron and blood thinners. She experienced mouth breathing, cracked lips, tachycardia, and dyspnea. She also experienced dizziness, lightheadedness, and fatigue upon standing quickly. Currently, she is asymptomatic. Her last dermatology consultation was in 03/2023.    Health Maintenance: reviewed and completed per patient preference.     ROS:   - CONSTITUTIONAL: Denies weight loss, fever and chills.  - HEENT: Denies changes in vision and hearing.  - RESPIRATORY: Denies SOB and cough.  - CV: Denies palpitations and CP.  - GI: Denies abdominal pain, nausea, vomiting and diarrhea.  - : Denies dysuria and urinary frequency.  - MSK: Denies myalgia and joint pain.  - SKIN: Denies rash and pruritus.  - NEUROLOGICAL: Denies headache and syncope.  - PSYCHIATRIC: Denies recent changes in mood. Denies anxiety and depression.           Social History     Socioeconomic History    Marital status: Single     Spouse name: Not on file    Number of children: Not on file    Years of education: Not on file    Highest education level: Not on file   Occupational History    Not on file   Tobacco Use    Smoking status: Never    Smokeless tobacco: Never   Vaping Use    Vaping status: Never Used   Substance and Sexual Activity    Alcohol use: Yes     Comment: rarely    Drug use: Never    Sexual activity: Not Currently     Birth control/protection: Abstinence   Other Topics Concern    Not on file   Social History Narrative    Not on file     Social Determinants of Health     Financial Resource Strain: Not on File (8/26/2019)    Received from [facility]  Strain     Financial Resource Strain: 0   Food Insecurity: Not on File (2019)    Received from [facility]    Food Insecurity     Food: 0   Transportation Needs: Not on File (2019)    Received from [facility]    Transportation Needs     Transportation: 0   Physical Activity: Not on File (2019)    Received from [facility]    Physical Activity     Physical Activity: 0   Stress: Not on File (2019)    Received from [facility]    Stress     Stress: 0   Social Connections: Not on File (2019)    Received from [facility]    Social Connections     Social Connections and Isolation: 0   Intimate Partner Violence: Not on file   Housing Stability: Not on File (2019)    Received from [facility]    Housing Stability     [place]      Allergies   Allergen Reactions    Bloodless Unspecified     Pt requesting bloodless protocol    [place] Oil Hives and Rash     Other reaction(s): Rash, urticarial      Cat Hair Extract Itching    Kiwi Extract      Other reaction(s): Lip Swellen  Other reaction(s): Lip Swellen              Current Outpatient Medications:     fluconazole, 150 mg, Oral, DAILY    doxycycline, Take 1 tablet by mouth twice daily for 14 days, Taking    levalbuterol, Inhale 1 puff as needed Inhalation every 6 hrs As needed 30 days, Taking    predniSONE, 10 mg, Oral, DAILY, Taking    escitalopram, 20 mg, Oral, DAILY, Taking    propranolol, Take 1 tablet by mouth once daily for anxiety, Taking    lamoTRIgine, Take 1 tablet by mouth in the AM, and 2 tabs at bedtime for 7 days, then 2 tablets twice daily after, Taking    nystatin, 1 Application, Topical, TID, Taking    chlorhexidine, Use to clean around areas of hydradenitis daily as needed, Taking    doxepin, Take 1 capsule by mouth every bedtime as needed for insomnia, Taking    ipratropium, Take 1 spray in each nostril up to 3 times a day as needed for nasal drip, Taking    clindamycin, 1 Application, Topical, BID, Taking    lidocaine, 1 Application, Topical, TID PRN, Taking     "escitalopram, 10 mg, Oral, DAILY, Taking    montelukast, 10 mg, Oral, DAILY, Taking    Qvar RediHaler, 2 Puff, Inhalation, BID, Taking    metronidazole, Apply topically to the affected area on the left part of the back, Taking   Objective:     Exam: /68 (BP Location: Left arm, Patient Position: Sitting, BP Cuff Size: Large adult)   Pulse 95   Temp 36.4 °C (97.6 °F) (Temporal)   Resp 18   Ht 1.753 m (5' 9\")   Wt (!) 186 kg (409 lb 4.8 oz)   SpO2 96%  Body mass index is 60.44 kg/m².    Physical Exam:  Constitutional: Alert, no distress, well-groomed.  Skin: Warm, dry, good turgor, 3 nonhealing ulcerations to her left lower pannus her left upper thigh and in between her gluteal crease on the left.  All 3 wounds do not appear to be infected.  There is some serosanguineous drainage on the tissue paper.  Edges appear to be healing well with primary wound healing.  Eye: Equal, round and reactive, conjunctiva clear, lids normal.  ENMT: Lips without lesions, good dentition, moist mucous membranes.  Neck: Trachea midline, no masses, no thyromegaly.  Respiratory: Unlabored respiratory effort, no cough.  Abd: soft, non tender, non distended, normal BS  MSK: Normal gait, moves all extremities.  Neuro: Grossly non-focal.   Psych: Alert and oriented x3, normal affect and mood.    Labs: Reviewed    Assessment & Plan:   44 y.o. female with the following -    1. Open wound  2. Hidradenitis  Acute on chronic issue.  Patient has a history of hidradenitis for which she currently sees dermatology.  Please see pictures uploaded into the media tab.  She uses Hibiclens, Cleocin solution, and metronidazole gel to help with skin eruptions.  3 ongoing wounds non healing wounds do not appear to be infected today.  There is a healthy wound bed with granulated borders. She is about to start 14 days of doxycycline for her sinusitis.  This was prescribed by her ear nose and throat doctor for which she will get some passive coverage " from.  We will refer her to the wound care clinic for ongoing wound management.  I have also recommended that she schedule an appointment to follow-up with her dermatologist.  She was provided with nonadherent wound care supplies today.  Follow-up precautions given.  - Referral to Wound Clinic    3. Subacute sinusitis, unspecified location  Acute issue, her ENT has already prescribed her 14 days of doxycycline she has not picked it up from pharmacy yet but will do so after our visit.  She is concerned however that anytime she takes antibiotics she gets a yeast infection.  We will give her a single dose of Diflucan prophylactically.  - fluconazole (DIFLUCAN) 150 MG tablet; Take 1 Tablet by mouth every day.  Dispense: 1 Tablet; Refill: 0        Return in about 4 weeks (around 8/30/2024), or wound check.    Please note that this dictation was created using voice recognition software. I have made every reasonable attempt to correct obvious errors, but I expect that there are errors of grammar and possibly content that I did not discover before finalizing the note.

## 2024-08-05 PROCEDURE — RXMED WILLOW AMBULATORY MEDICATION CHARGE

## 2024-08-06 ENCOUNTER — PATIENT MESSAGE (OUTPATIENT)
Dept: MEDICAL GROUP | Facility: MEDICAL CENTER | Age: 44
End: 2024-08-06
Payer: MEDICAID

## 2024-08-06 DIAGNOSIS — D64.9 ANEMIA, UNSPECIFIED TYPE: ICD-10-CM

## 2024-08-08 ENCOUNTER — TELEPHONE (OUTPATIENT)
Dept: HEALTH INFORMATION MANAGEMENT | Facility: OTHER | Age: 44
End: 2024-08-08
Payer: MEDICAID

## 2024-08-14 DIAGNOSIS — J45.40 MODERATE PERSISTENT ASTHMA WITHOUT COMPLICATION: ICD-10-CM

## 2024-08-14 DIAGNOSIS — Z91.09 ENVIRONMENTAL ALLERGIES: ICD-10-CM

## 2024-08-14 PROCEDURE — RXMED WILLOW AMBULATORY MEDICATION CHARGE: Performed by: INTERNAL MEDICINE

## 2024-08-14 RX ORDER — MONTELUKAST SODIUM 10 MG/1
10 TABLET ORAL DAILY
Qty: 30 TABLET | Refills: 5 | Status: SHIPPED | OUTPATIENT
Start: 2024-08-14

## 2024-08-14 NOTE — TELEPHONE ENCOUNTER
Received request via: Pharmacy    Was the patient seen in the last year in this department? Yes    Does the patient have an active prescription (recently filled or refills available) for medication(s) requested? No    Pharmacy Name: [facility]    Does the patient have care home Plus and need 100-day supply? (This applies to ALL medications) Patient does not have SCP    Future Appointments         Provider Department [place]    8/30/2024 2:20 PM (Arrive by 2:05 PM) Leticia Houser M.D. [facility]

## 2024-08-15 ENCOUNTER — OFFICE VISIT (OUTPATIENT)
Dept: MEDICAL GROUP | Facility: MEDICAL CENTER | Age: 44
End: 2024-08-15
Attending: INTERNAL MEDICINE
Payer: MEDICAID

## 2024-08-15 ENCOUNTER — PHARMACY VISIT (OUTPATIENT)
Dept: PHARMACY | Facility: MEDICAL CENTER | Age: 44
End: 2024-08-15
Payer: COMMERCIAL

## 2024-08-15 VITALS
SYSTOLIC BLOOD PRESSURE: 120 MMHG | HEART RATE: 99 BPM | OXYGEN SATURATION: 97 % | RESPIRATION RATE: 16 BRPM | DIASTOLIC BLOOD PRESSURE: 68 MMHG | HEIGHT: 69 IN | TEMPERATURE: 97 F | WEIGHT: 293 LBS | BODY MASS INDEX: 43.4 KG/M2

## 2024-08-15 DIAGNOSIS — L30.8 SPONGIOTIC DERMATITIS: ICD-10-CM

## 2024-08-15 DIAGNOSIS — L73.2 HIDRADENITIS SUPPURATIVA: ICD-10-CM

## 2024-08-15 PROBLEM — R21 RASH: Status: RESOLVED | Noted: 2024-07-09 | Resolved: 2024-08-15

## 2024-08-15 PROBLEM — T14.8XXA OPEN WOUND: Status: RESOLVED | Noted: 2024-08-02 | Resolved: 2024-08-15

## 2024-08-15 PROCEDURE — RXMED WILLOW AMBULATORY MEDICATION CHARGE: Performed by: INTERNAL MEDICINE

## 2024-08-15 PROCEDURE — 99213 OFFICE O/P EST LOW 20 MIN: CPT | Performed by: INTERNAL MEDICINE

## 2024-08-15 PROCEDURE — 3078F DIAST BP <80 MM HG: CPT | Performed by: INTERNAL MEDICINE

## 2024-08-15 PROCEDURE — 99214 OFFICE O/P EST MOD 30 MIN: CPT | Performed by: INTERNAL MEDICINE

## 2024-08-15 PROCEDURE — 3074F SYST BP LT 130 MM HG: CPT | Performed by: INTERNAL MEDICINE

## 2024-08-15 RX ORDER — TRIAMCINOLONE ACETONIDE 1 MG/G
CREAM TOPICAL
Qty: 45 G | Refills: 2 | Status: SHIPPED | OUTPATIENT
Start: 2024-08-15

## 2024-08-15 NOTE — ASSESSMENT & PLAN NOTE
Reports the rash around her neck has gotten more itchy and severe lately.  It also seems to have spread.  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  Patient is not currently using any topical steroid, states she has been putting nystatin powder on it.  Dermatology gave her a sample of a cream to use for the rash but she has not been using this.

## 2024-08-15 NOTE — ASSESSMENT & PLAN NOTE
Unfortunately, she continues to have severe symptoms related to her hidradenitis.  Was placed on Hadlima through dermatology which she used from January to May of this year without improvement.  She is interested in seeing another dermatologist to talk about laser treatment

## 2024-08-15 NOTE — PROGRESS NOTES
Subjective:   Ernesto Ramos is a 44 y.o. female here today for neck rash    Spongiotic dermatitis  Reports the rash around her neck has gotten more itchy and severe lately.  It also seems to have spread.  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  Patient is not currently using any topical steroid, states she has been putting nystatin powder on it.  Dermatology gave her a sample of a cream to use for the rash but she has not been using this.    Hidradenitis suppurativa  Unfortunately, she continues to have severe symptoms related to her hidradenitis.  Was placed on Hadlima through dermatology which she used from January to May of this year without improvement.  She is interested in seeing another dermatologist to talk about laser treatment/non-pharmacologic management      Current medicines (including changes today)  Current Outpatient Medications   Medication Sig Dispense Refill    triamcinolone acetonide (KENALOG) 0.1 % Cream Apply thin layer to rash on neck twice daily 45 g 2    montelukast (SINGULAIR) 10 MG Tab Take 1 Tablet by mouth every day. 30 Tablet 5    doxycycline (VIBRAMYCIN) 100 MG Tab Take 1 tablet by mouth twice daily for 14 days 28 Tablet 0    levalbuterol (XOPENEX HFA) 45 MCG/ACT inhaler Inhale 1 puff as needed Inhalation every 6 hrs As needed 30 days 15 g 0    escitalopram (LEXAPRO) 20 MG tablet Take 1 Tablet by mouth every day for anxiety and depression. 30 Tablet 1    propranolol (INDERAL) 10 MG Tab Take 1 tablet by mouth once daily for anxiety 30 Tablet 1    lamoTRIgine (LAMICTAL) 25 MG Tab Take 1 tablet by mouth in the AM, and 2 tabs at bedtime for 7 days, then 2 tablets twice daily after 120 Tablet 1    nystatin (MYCOSTATIN) powder Apply 1 g topically 3 times a day. 60 g 0    chlorhexidine (HIBICLENS) 4 % solution Use to clean around areas of hydradenitis daily as needed 946 mL 3    doxepin (SINEQUAN) 25 MG Cap Take 1 capsule by mouth  every bedtime as needed for insomnia 30 Capsule 0    ipratropium (ATROVENT) 0.03 % Solution Take 1 spray in each nostril up to 3 times a day as needed for nasal drip 30 mL 5    clindamycin (CLEOCIN) 1 % Solution Apply 1 application topically 2 times a day. Apply to areas with cysts 60 mL 5    lidocaine 2 % Gel Apply 1 Application topically 3 times a day as needed (pain with cysts). 30 mL 3    escitalopram (LEXAPRO) 10 MG Tab Take 1 tablet by mouth every day. 30 Tablet 5    QVAR REDIHALER 80 MCG/ACT inhaler Inhale 2 Puffs 2 times a day. 10.6 g 5    metronidazole (METROGEL) 0.75 % gel Apply topically to the affected area on the left part of the back 45 g 4     No current facility-administered medications for this visit.     She  has a past medical history of Allergy, Anemia (2018), Anesthesia (07/28/2022), Asthma (07/28/2022), Bowel habit changes (07/28/2022), Breath shortness (07/28/2022), Chronic sinusitis, Disorder of thyroid (07/28/2022), GERD (gastroesophageal reflux disease), Head ache (07/28/2022), Heart burn (07/28/2022), Hydradenitis, Hydradenitis, Obesity, Psychiatric problem (07/28/2022), and Snoring (07/28/2022).         Objective:     Vitals:    08/15/24 1443   BP: 120/68   Pulse: 99   Resp: 16   Temp: 36.1 °C (97 °F)   SpO2: 97%     Body mass index is 60.41 kg/m².   Physical Exam:  Constitutional: Alert, no distress.  Skin: Warm, dry, good turgor, red papular rash around neck line with some peeling skin.  Eye: Equal, round and reactive, conjunctiva clear, lids normal.  Psych: Alert and oriented x3, normal affect and mood.    Assessment and Plan:   The following treatment plan was discussed    1. Spongiotic dermatitis  We reviewed her biopsy results which are consistent with a spongiotic dermatitis.  We discussed that this is a class of skin conditions that includes eczema and that typical treatment is with a topical corticosteroid.  I have prescribed her Kenalog and discussed twice daily use.  Reviewed  that this may take several weeks to improve and may recur.  Advised her to keep the medication on hand so that she could use it if she notices a recurrence  - triamcinolone acetonide (KENALOG) 0.1 % Cream; Apply thin layer to rash on neck twice daily  Dispense: 45 g; Refill: 2    2. Hidradenitis suppurativa  She is interested in follow-up with an alternative dermatologist.  She is currently just using topical therapy.  New referral placed.  - Referral to Dermatology        Followup: Return if symptoms worsen or fail to improve.

## 2024-08-20 PROCEDURE — RXMED WILLOW AMBULATORY MEDICATION CHARGE: Performed by: PHYSICIAN ASSISTANT

## 2024-08-21 ENCOUNTER — HOSPITAL ENCOUNTER (OUTPATIENT)
Facility: MEDICAL CENTER | Age: 44
End: 2024-08-21
Attending: INTERNAL MEDICINE
Payer: MEDICAID

## 2024-08-21 PROCEDURE — 82274 ASSAY TEST FOR BLOOD FECAL: CPT

## 2024-08-22 ENCOUNTER — PHARMACY VISIT (OUTPATIENT)
Dept: PHARMACY | Facility: MEDICAL CENTER | Age: 44
End: 2024-08-22
Payer: COMMERCIAL

## 2024-08-22 DIAGNOSIS — J45.41 MODERATE PERSISTENT ASTHMA WITH ACUTE EXACERBATION: ICD-10-CM

## 2024-08-22 PROCEDURE — RXMED WILLOW AMBULATORY MEDICATION CHARGE: Performed by: INTERNAL MEDICINE

## 2024-08-22 RX ORDER — ESCITALOPRAM OXALATE 20 MG/1
20 TABLET ORAL DAILY
Qty: 30 TABLET | Refills: 1 | Status: CANCELLED | OUTPATIENT
Start: 2024-08-22

## 2024-08-22 RX ORDER — PREDNISONE 10 MG/1
10 TABLET ORAL DAILY
Qty: 5 TABLET | Refills: 0 | Status: SHIPPED | OUTPATIENT
Start: 2024-08-22

## 2024-08-22 NOTE — TELEPHONE ENCOUNTER
Received request via: Patient    Was the patient seen in the last year in this department? Yes    Does the patient have an active prescription (recently filled or refills available) for medication(s) requested? No    Pharmacy Name: [facility]    Does the patient have skilled nursing Plus and need 100-day supply? (This applies to ALL medications) Patient does not have SCP

## 2024-08-27 DIAGNOSIS — Z12.11 SCREEN FOR COLON CANCER: ICD-10-CM

## 2024-08-27 LAB — AMBIGUOUS DTTM AMBI4: NORMAL

## 2024-08-29 LAB — IMM ASSAY OCC BLD FITOB: NEGATIVE

## 2024-09-05 DIAGNOSIS — J45.41 MODERATE PERSISTENT ASTHMA WITH ACUTE EXACERBATION: ICD-10-CM

## 2024-09-05 PROCEDURE — RXMED WILLOW AMBULATORY MEDICATION CHARGE: Performed by: INTERNAL MEDICINE

## 2024-09-06 ENCOUNTER — PHARMACY VISIT (OUTPATIENT)
Dept: PHARMACY | Facility: MEDICAL CENTER | Age: 44
End: 2024-09-06
Payer: COMMERCIAL

## 2024-09-06 PROCEDURE — RXMED WILLOW AMBULATORY MEDICATION CHARGE: Performed by: INTERNAL MEDICINE

## 2024-09-06 RX ORDER — PREDNISONE 10 MG/1
10 TABLET ORAL DAILY
Qty: 5 TABLET | Refills: 0 | Status: SHIPPED | OUTPATIENT
Start: 2024-09-06

## 2024-09-06 NOTE — TELEPHONE ENCOUNTER
Received request via: Pharmacy    Was the patient seen in the last year in this department? Yes    Does the patient have an active prescription (recently filled or refills available) for medication(s) requested? No    Pharmacy Name: [facility]    Does the patient have skilled nursing Plus and need 100-day supply? (This applies to ALL medications) Patient does not have SCP

## 2024-09-09 ENCOUNTER — PHARMACY VISIT (OUTPATIENT)
Dept: PHARMACY | Facility: MEDICAL CENTER | Age: 44
End: 2024-09-09
Payer: COMMERCIAL

## 2024-09-09 ENCOUNTER — APPOINTMENT (OUTPATIENT)
Dept: RADIOLOGY | Facility: IMAGING CENTER | Age: 44
End: 2024-09-09
Payer: MEDICAID

## 2024-09-09 ENCOUNTER — OFFICE VISIT (OUTPATIENT)
Dept: URGENT CARE | Facility: CLINIC | Age: 44
End: 2024-09-09
Payer: MEDICAID

## 2024-09-09 ENCOUNTER — PATIENT MESSAGE (OUTPATIENT)
Dept: MEDICAL GROUP | Facility: MEDICAL CENTER | Age: 44
End: 2024-09-09

## 2024-09-09 VITALS
HEART RATE: 98 BPM | HEIGHT: 69 IN | DIASTOLIC BLOOD PRESSURE: 70 MMHG | WEIGHT: 293 LBS | TEMPERATURE: 97 F | OXYGEN SATURATION: 97 % | SYSTOLIC BLOOD PRESSURE: 124 MMHG | BODY MASS INDEX: 43.4 KG/M2 | RESPIRATION RATE: 22 BRPM

## 2024-09-09 DIAGNOSIS — R05.1 ACUTE COUGH: ICD-10-CM

## 2024-09-09 DIAGNOSIS — J22 LOWER RESPIRATORY TRACT INFECTION: ICD-10-CM

## 2024-09-09 DIAGNOSIS — J45.41 MODERATE PERSISTENT ASTHMA WITH ACUTE EXACERBATION: ICD-10-CM

## 2024-09-09 PROCEDURE — RXMED WILLOW AMBULATORY MEDICATION CHARGE: Performed by: NURSE PRACTITIONER

## 2024-09-09 PROCEDURE — 99203 OFFICE O/P NEW LOW 30 MIN: CPT

## 2024-09-09 PROCEDURE — 71046 X-RAY EXAM CHEST 2 VIEWS: CPT | Mod: TC | Performed by: NURSE PRACTITIONER

## 2024-09-09 PROCEDURE — 3078F DIAST BP <80 MM HG: CPT

## 2024-09-09 PROCEDURE — 3074F SYST BP LT 130 MM HG: CPT

## 2024-09-09 PROCEDURE — RXMED WILLOW AMBULATORY MEDICATION CHARGE

## 2024-09-09 RX ORDER — TRAZODONE HYDROCHLORIDE 100 MG/1
TABLET ORAL
Qty: 30 TABLET | Refills: 0 | OUTPATIENT
Start: 2024-09-09

## 2024-09-09 RX ORDER — METHYLPREDNISOLONE 4 MG
TABLET, DOSE PACK ORAL
Qty: 21 TABLET | Refills: 0 | Status: SHIPPED | OUTPATIENT
Start: 2024-09-09

## 2024-09-09 RX ORDER — PROPRANOLOL HCL 10 MG
TABLET ORAL
Qty: 60 TABLET | Refills: 1 | OUTPATIENT
Start: 2024-09-09

## 2024-09-09 RX ORDER — LAMOTRIGINE 25 MG/1
TABLET ORAL
Qty: 60 TABLET | Refills: 0 | OUTPATIENT
Start: 2024-09-09

## 2024-09-09 RX ORDER — ESCITALOPRAM OXALATE 10 MG/1
TABLET ORAL
Qty: 30 TABLET | Refills: 0 | OUTPATIENT
Start: 2024-09-09

## 2024-09-09 NOTE — PROGRESS NOTES
Subjective     Ernesto Ramos is a 44 y.o. patient who presents with asthma exacerbation x1 day.     HPI:   Ernesto is a 43yo patient presenting for asthma exacerbation x1 day. Reports associated nasal congestion, post-nasal drip, wheezing, and productive cough. Denies chest pain. Reports previous asthma exacerbations that have felt similar to these symptoms. Reports mild exertional shortness of breath. Denies respiratory distress. No swelling of extremities. Reports she does not tolerate her inhalers due to unwanted side effects. She has attempted OTC antihistamines for relief. Denies headache or dizziness. No vision changes. Denies abdominal pain, vomiting, or diarrhea.     Review of Systems   Constitutional:  Negative for chills, fever and malaise/fatigue.   HENT:  Positive for congestion. Negative for ear discharge, ear pain and sore throat.    Eyes:  Negative for blurred vision, double vision, photophobia, pain, discharge and redness.   Respiratory:  Positive for cough, sputum production and wheezing. Negative for shortness of breath and stridor.    Cardiovascular:  Negative for chest pain, palpitations and leg swelling.   Gastrointestinal:  Negative for abdominal pain, diarrhea, nausea and vomiting.   Musculoskeletal:  Negative for myalgias and neck pain.   Skin:  Negative for rash.   Neurological:  Negative for dizziness, tingling, sensory change, weakness and headaches.     Past Medical History:   Diagnosis Date    Allergy     Anemia 2018    Anesthesia 07/28/2022    restless on emergence    Asthma 07/28/2022    uses inhaler    Bowel habit changes 07/28/2022    constipation    Breath shortness 07/28/2022    with exertion and stress    Chronic sinusitis     Disorder of thyroid 07/28/2022    medicated    GERD (gastroesophageal reflux disease)     Head ache 07/28/2022    intermittently    Heart burn 07/28/2022    stress related non medicated    Hydradenitis     Hydradenitis     Obesity     Psychiatric  problem 07/28/2022    depression/anxiety/panic attacks    Snoring 07/28/2022     Past Surgical History:   Procedure Laterality Date    TX THERAPEUTIC SPINAL PUNCTURE DRAINAGE CSF  8/11/2022    Procedure: LUMBAR PUNCTURE, WITH DRAIN INSERTION;  Surgeon: Norman Mejia M.D.;  Location: [facility];  Service: Neurosurgery    TX STEREOTACTIC COMP ASSIST PROC,CRANIAL,INT*  8/11/2022    Procedure: ENDOSCOPIC TRANSSPHENOIDAL RESECTION OF PITUITARY TUMOR, UTILIZATION OF IMAGE GUIDANCE, INTRADURAL, SEPTOPLASTY, BILATERAL INFERIOR TURBINATE REDUCATION, BILATERAL MAXILLARY ANTROSTOMY, BILATERAL TOTAL ETHMOIDECTOMY;  Surgeon: Mary Pena M.D.;  Location: [facility];  Service: Ent    TX REPAIR OF NASAL SEPTUM  8/11/2022    Procedure: SEPTOPLASTY;  Surgeon: Mary Pena M.D.;  Location: [facility];  Service: Ent    TX EXCISION TURBINATE,SUBMUCOUS  8/11/2022    Procedure: REDUCTION, NASAL TURBINATE;  Surgeon: Mary Pena M.D.;  Location: [facility];  Service: Ent    TX NASAL SCOPY,OPEN MAXILL SINUS  8/11/2022    Procedure: MAXILLARY ANTROSTOMY;  Surgeon: Mary Pena M.D.;  Location: [facility];  Service: Ent    TX NASAL SCOPY,REMV TOTL ETHMOID  8/11/2022    Procedure: ETHMOIDECTOMY;  Surgeon: Mary Pena M.D.;  Location: [facility];  Service: Ent    TRANSSPHENOIDAL RESECTION  8/11/2022    Procedure: STEALTH TRANSSPHENOLDAL PITUITARY RESECTION AND LUMBAR DRAIN;  Surgeon: Norman Mejia M.D.;  Location: [facility];  Service: Neurosurgery    OTHER 2000    skin and sweat gland removal of left axilla for hydradenitis     Allergies: Bloodless, [place] oil, Cat hair extract, and Kiwi extract     Current Outpatient Medications:     escitalopram (LEXAPRO) 10 MG Tab, 1 Tablet by mouth daily for depression and anxiety., Disp: 30 Tablet, Rfl: 0    propranolol (INDERAL) 10 MG Tab, 1 Tablet BID take one tablet daily for anxiety StartDate : 09-, Disp: 60  Tablet, Rfl: 1    lamoTRIgine (LAMICTAL) 25 MG Tab, Take 2 Tablets by mouth daily., Disp: 60 Tablet, Rfl: 0    traZODone (DESYREL) 100 MG Tab, Take 1 Tablet  by mouth nightly as needed for sleep, Disp: 30 Tablet, Rfl: 0    methylPREDNISolone (MEDROL DOSEPAK) 4 MG Tablet Therapy Pack, Follow schedule on package instructions., Disp: 21 Tablet, Rfl: 0    amoxicillin-clavulanate (AUGMENTIN) 875-125 MG Tab, Take 1 Tablet by mouth 2 times a day for 5 days., Disp: 10 Tablet, Rfl: 0    montelukast (SINGULAIR) 10 MG Tab, Take 1 Tablet by mouth every day., Disp: 30 Tablet, Rfl: 5    levalbuterol (XOPENEX HFA) 45 MCG/ACT inhaler, Inhale 1 puff as needed Inhalation every 6 hrs As needed 30 days, Disp: 15 g, Rfl: 0    escitalopram (LEXAPRO) 20 MG tablet, Take 1 Tablet by mouth every day for anxiety and depression., Disp: 30 Tablet, Rfl: 1    nystatin (MYCOSTATIN) powder, Apply 1 g topically 3 times a day., Disp: 60 g, Rfl: 0    chlorhexidine (HIBICLENS) 4 % solution, Use to clean around areas of hydradenitis daily as needed, Disp: 946 mL, Rfl: 3    doxepin (SINEQUAN) 25 MG Cap, Take 1 capsule by mouth every bedtime as needed for insomnia, Disp: 30 Capsule, Rfl: 0    ipratropium (ATROVENT) 0.03 % Solution, Take 1 spray in each nostril up to 3 times a day as needed for nasal drip, Disp: 30 mL, Rfl: 5    escitalopram (LEXAPRO) 10 MG Tab, Take 1 tablet by mouth every day., Disp: 30 Tablet, Rfl: 5    QVAR REDIHALER 80 MCG/ACT inhaler, Inhale 2 Puffs 2 times a day., Disp: 10.6 g, Rfl: 5    Social History     Tobacco Use    Smoking status: Never    Smokeless tobacco: Never   Vaping Use    Vaping status: Never Used   Substance Use Topics    Alcohol use: Yes     Comment: rarely    Drug use: Never     Family History   Problem Relation Age of Onset    Hypertension Mother     Diabetes Mother     Cancer Mother 40        thyroid    Stroke Mother     Hypertension Father     Breast Cancer Sister 48    Heart Disease Neg Hx       Medications,  "Allergies, and current problem list reviewed today in Epic.      Objective     /70   Pulse 98   Temp 36.1 °C (97 °F)   Resp (!) 22   Ht 1.753 m (5' 9\")   Wt (!) 186 kg (409 lb)   SpO2 97%   BMI 60.40 kg/m²      Physical Exam  Vitals reviewed.   Constitutional:       General: She is not in acute distress.  HENT:      Head: No right periorbital erythema or left periorbital erythema.      Jaw: There is normal jaw occlusion. No tenderness, swelling, pain on movement or malocclusion.      Right Ear: Tympanic membrane, ear canal and external ear normal.      Left Ear: Tympanic membrane, ear canal and external ear normal.      Nose: Congestion present.      Mouth/Throat:      Lips: No lesions.      Mouth: Mucous membranes are moist. No oral lesions or angioedema.      Tongue: No lesions. Tongue does not deviate from midline.      Palate: No mass and lesions.      Pharynx: Uvula midline. Posterior oropharyngeal erythema present. No oropharyngeal exudate or uvula swelling.   Eyes:      General: Vision grossly intact. Gaze aligned appropriately. No visual field deficit.     Extraocular Movements: Extraocular movements intact.      Conjunctiva/sclera: Conjunctivae normal.      Pupils: Pupils are equal, round, and reactive to light.      Right eye: Pupil is round, reactive and not sluggish.      Left eye: Pupil is round, reactive and not sluggish.      Funduscopic exam:     Right eye: Red reflex present.         Left eye: Red reflex present.  Neck:      Trachea: Trachea normal. No abnormal tracheal secretions or tracheal deviation.   Cardiovascular:      Rate and Rhythm: Normal rate and regular rhythm.      Pulses: Normal pulses.      Heart sounds: Normal heart sounds.   Pulmonary:      Effort: Pulmonary effort is normal. No tachypnea, accessory muscle usage, prolonged expiration, respiratory distress or retractions.      Breath sounds: No stridor, decreased air movement or transmitted upper airway sounds. " Examination of the left-middle field reveals rales. Wheezing (diffuse) and rales present. No rhonchi.   Musculoskeletal:         General: Normal range of motion.      Cervical back: Full passive range of motion without pain, normal range of motion and neck supple. No erythema, rigidity, tenderness or crepitus. No pain with movement. Normal range of motion.      Right lower leg: No edema.      Left lower leg: No edema.   Lymphadenopathy:      Cervical: No cervical adenopathy.   Skin:     General: Skin is warm and dry.      Findings: No rash.   Neurological:      Mental Status: She is alert. Mental status is at baseline.   Psychiatric:         Mood and Affect: Mood normal.         Behavior: Behavior normal.         Thought Content: Thought content normal.       DX-CHEST-2 VIEWS    Result Date: 9/9/2024 9/9/2024 12:27 PM HISTORY/REASON FOR EXAM:  Cough TECHNIQUE/EXAM DESCRIPTION AND NUMBER OF VIEWS: Two views of the chest. COMPARISON:  None. FINDINGS: No pulmonary infiltrates or consolidations are noted. No pleural effusions, no pneumothorax are appreciated. Normal cardiopericardial silhouette.     1. No active cardiopulmonary abnormalities are identified.      Assessment & Plan     1. Acute cough   - DX-CHEST-2 VIEWS; Future    2. Moderate persistent asthma with acute exacerbation   - methylPREDNISolone (MEDROL DOSEPAK) 4 MG Tablet Therapy Pack; Follow schedule on package instructions.  Dispense: 21 Tablet; Refill: 0    3. Lower respiratory tract infection   - amoxicillin-clavulanate (AUGMENTIN) 875-125 MG Tab; Take 1 Tablet by mouth 2 times a day for 5 days.  Dispense: 10 Tablet; Refill: 0       MDM/Comments:   The patient has a productive cough with purulent sputum and high risk comorbidity of asthma. Respiratory exam revealed rales in the left middle lung field. Based on history and examination findings the patient will be treated with Augmentin for lower respiratory tract infection. A Medrol dosepak will also be  prescribed for airway inflammation and dyspnea.    Patient declined Duoneb in clinic. Patient advised to continue inhalers as prescribed.     Illness progression and alarm symptoms discussed with patient, emphasizing low threshold for returning to clinic/emergency department for worsening symptoms. Patient is agreeable to the plan and verbalizes understanding, and will follow up if warranted.        Symptomatic treatment with:  -Tylenol and ibuprofen   - Over the counter cough medications   - Warm tea with honey   - Sleep propped on pillows   - Cool mist humidifier   - Staying hydrated keeps mucus thin  - Flonase   - Nasal saline or Neti pot      Follow up with primary care provider. Urgently for worsening symptoms, persistent fevers, facial swelling, visual changes, weakness, elevated heart rate, stiff neck, prolonged cough, persistent wheezing, leg swelling, or any other concerns. Seek emergency medical care immediately for: Trouble breathing, persistent pain or pressure in the chest, confusion, inability to wake or stay awake, bluish lips or face, persistent tachycardia (fast heart rate), prolonged dizziness, persistent high grade fevers.       Differential diagnosis, natural history, supportive care, and indications for immediate follow-up discussed.       Follow-up as needed if symptoms worsen or fail to improve to PCP, Urgent care or Emergency Room.                                     Electronically signed by TAO Leija

## 2024-09-11 PROCEDURE — RXMED WILLOW AMBULATORY MEDICATION CHARGE: Performed by: INTERNAL MEDICINE

## 2024-09-11 ASSESSMENT — ENCOUNTER SYMPTOMS
SHORTNESS OF BREATH: 0
NECK PAIN: 0
VOMITING: 0
DIZZINESS: 0
STRIDOR: 0
HEADACHES: 0
FEVER: 0
PALPITATIONS: 0
CHILLS: 0
EYE PAIN: 0
BLURRED VISION: 0
NAUSEA: 0
EYE REDNESS: 0
SPUTUM PRODUCTION: 1
PHOTOPHOBIA: 0
MYALGIAS: 0
SORE THROAT: 0
DIARRHEA: 0
WHEEZING: 1
EYE DISCHARGE: 0
TINGLING: 0
COUGH: 1
ABDOMINAL PAIN: 0
WEAKNESS: 0
DOUBLE VISION: 0
SENSORY CHANGE: 0

## 2024-09-11 ASSESSMENT — VISUAL ACUITY: OU: 1

## 2024-09-23 PROCEDURE — RXMED WILLOW AMBULATORY MEDICATION CHARGE: Performed by: INTERNAL MEDICINE

## 2024-09-24 ENCOUNTER — PHARMACY VISIT (OUTPATIENT)
Dept: PHARMACY | Facility: MEDICAL CENTER | Age: 44
End: 2024-09-24
Payer: COMMERCIAL

## 2024-10-19 ENCOUNTER — PHARMACY VISIT (OUTPATIENT)
Dept: PHARMACY | Facility: MEDICAL CENTER | Age: 44
End: 2024-10-19
Payer: COMMERCIAL

## 2024-10-19 ENCOUNTER — OFFICE VISIT (OUTPATIENT)
Dept: URGENT CARE | Facility: CLINIC | Age: 44
End: 2024-10-19
Payer: MEDICAID

## 2024-10-19 VITALS
BODY MASS INDEX: 44.41 KG/M2 | HEIGHT: 68 IN | RESPIRATION RATE: 16 BRPM | DIASTOLIC BLOOD PRESSURE: 78 MMHG | WEIGHT: 293 LBS | SYSTOLIC BLOOD PRESSURE: 128 MMHG | TEMPERATURE: 97.2 F | OXYGEN SATURATION: 98 % | HEART RATE: 98 BPM

## 2024-10-19 DIAGNOSIS — F33.2 SEVERE EPISODE OF RECURRENT MAJOR DEPRESSIVE DISORDER, WITHOUT PSYCHOTIC FEATURES (HCC): ICD-10-CM

## 2024-10-19 DIAGNOSIS — J45.41 MODERATE PERSISTENT ASTHMA WITH ACUTE EXACERBATION: ICD-10-CM

## 2024-10-19 PROCEDURE — 3074F SYST BP LT 130 MM HG: CPT

## 2024-10-19 PROCEDURE — RXMED WILLOW AMBULATORY MEDICATION CHARGE

## 2024-10-19 PROCEDURE — 99214 OFFICE O/P EST MOD 30 MIN: CPT

## 2024-10-19 PROCEDURE — 3078F DIAST BP <80 MM HG: CPT

## 2024-10-19 RX ORDER — ESCITALOPRAM OXALATE 10 MG/1
10 TABLET ORAL DAILY
Qty: 30 TABLET | Refills: 2 | Status: SHIPPED | OUTPATIENT
Start: 2024-10-19 | End: 2024-10-19 | Stop reason: ALTCHOICE

## 2024-10-19 RX ORDER — ESCITALOPRAM OXALATE 10 MG/1
10 TABLET ORAL DAILY
Qty: 30 TABLET | Refills: 2 | Status: SHIPPED | OUTPATIENT
Start: 2024-10-19

## 2024-10-19 RX ORDER — PREDNISONE 20 MG/1
40 TABLET ORAL DAILY
Qty: 10 TABLET | Refills: 0 | Status: SHIPPED | OUTPATIENT
Start: 2024-10-19 | End: 2024-10-24

## 2024-10-19 RX ORDER — PREDNISONE 20 MG/1
40 TABLET ORAL DAILY
Qty: 10 TABLET | Refills: 0 | Status: SHIPPED | OUTPATIENT
Start: 2024-10-19 | End: 2024-10-19

## 2024-10-19 ASSESSMENT — ENCOUNTER SYMPTOMS
CHILLS: 0
VOMITING: 0
NAUSEA: 0
WHEEZING: 1
MYALGIAS: 0
FREQUENT THROAT CLEARING: 1
SPUTUM PRODUCTION: 0
ABDOMINAL PAIN: 0
FEVER: 0
DIARRHEA: 0
COUGH: 1
SHORTNESS OF BREATH: 1
DIFFICULTY BREATHING: 1
HEADACHES: 0
DYSPNEA ON EXERTION: 1
SORE THROAT: 0

## 2024-11-27 ENCOUNTER — PHARMACY VISIT (OUTPATIENT)
Dept: PHARMACY | Facility: MEDICAL CENTER | Age: 44
End: 2024-11-27
Payer: COMMERCIAL

## 2024-11-27 PROCEDURE — RXMED WILLOW AMBULATORY MEDICATION CHARGE

## 2024-11-27 RX ORDER — IBUPROFEN 600 MG/1
600 TABLET, FILM COATED ORAL EVERY 6 HOURS
Qty: 40 TABLET | Refills: 0 | OUTPATIENT
Start: 2024-11-27

## 2024-11-27 RX ORDER — CHLORHEXIDINE GLUCONATE ORAL RINSE 1.2 MG/ML
SOLUTION DENTAL
Qty: 473 ML | Refills: 3 | OUTPATIENT
Start: 2024-11-27

## 2024-11-27 RX ORDER — METHYLPREDNISOLONE 4 MG/1
24 TABLET ORAL
Qty: 21 TABLET | Refills: 0 | OUTPATIENT
Start: 2024-11-27

## 2024-12-30 ENCOUNTER — PATIENT MESSAGE (OUTPATIENT)
Dept: MEDICAL GROUP | Facility: MEDICAL CENTER | Age: 44
End: 2024-12-30
Payer: MEDICAID

## 2025-01-03 ENCOUNTER — OFFICE VISIT (OUTPATIENT)
Dept: MEDICAL GROUP | Facility: MEDICAL CENTER | Age: 45
End: 2025-01-03
Attending: NURSE PRACTITIONER
Payer: MEDICAID

## 2025-01-03 ENCOUNTER — PHARMACY VISIT (OUTPATIENT)
Dept: PHARMACY | Facility: MEDICAL CENTER | Age: 45
End: 2025-01-03
Payer: COMMERCIAL

## 2025-01-03 VITALS
RESPIRATION RATE: 16 BRPM | TEMPERATURE: 97.9 F | BODY MASS INDEX: 62.37 KG/M2 | OXYGEN SATURATION: 98 % | WEIGHT: 293 LBS | SYSTOLIC BLOOD PRESSURE: 128 MMHG | HEART RATE: 87 BPM | DIASTOLIC BLOOD PRESSURE: 80 MMHG

## 2025-01-03 DIAGNOSIS — J45.40 MODERATE PERSISTENT ASTHMA WITHOUT COMPLICATION: ICD-10-CM

## 2025-01-03 DIAGNOSIS — E66.813 CLASS 3 SEVERE OBESITY DUE TO EXCESS CALORIES WITH SERIOUS COMORBIDITY AND BODY MASS INDEX (BMI) OF 50.0 TO 59.9 IN ADULT (HCC): ICD-10-CM

## 2025-01-03 DIAGNOSIS — E66.01 CLASS 3 SEVERE OBESITY DUE TO EXCESS CALORIES WITH SERIOUS COMORBIDITY AND BODY MASS INDEX (BMI) OF 50.0 TO 59.9 IN ADULT (HCC): ICD-10-CM

## 2025-01-03 PROCEDURE — RXMED WILLOW AMBULATORY MEDICATION CHARGE: Performed by: NURSE PRACTITIONER

## 2025-01-03 PROCEDURE — 3079F DIAST BP 80-89 MM HG: CPT | Performed by: NURSE PRACTITIONER

## 2025-01-03 PROCEDURE — 99212 OFFICE O/P EST SF 10 MIN: CPT | Performed by: NURSE PRACTITIONER

## 2025-01-03 PROCEDURE — 3074F SYST BP LT 130 MM HG: CPT | Performed by: NURSE PRACTITIONER

## 2025-01-03 PROCEDURE — 99214 OFFICE O/P EST MOD 30 MIN: CPT | Performed by: NURSE PRACTITIONER

## 2025-01-03 RX ORDER — LEVALBUTEROL TARTRATE 45 UG/1
AEROSOL, METERED ORAL
Qty: 15 G | Refills: 0 | Status: SHIPPED | OUTPATIENT
Start: 2025-01-03

## 2025-01-03 NOTE — PROGRESS NOTES
Verbal consent was acquired by the patient to use [employer] ambient listening note generation during this visit     Chief Complaint   Patient presents with    Asthma       Subjective:     HPI:   History of Present Illness  The patient presents for evaluation of asthma, heart murmur, weight gain, hidradenitis suppurativa, and stress.    She reports persistent respiratory distress, despite a recorded oxygen saturation of 98 percent. She is uncertain if her symptoms are attributable to her obesity or asthma. She recently initiated therapy with Trelegy, a medication she has not previously used. She experienced an asthma exacerbation on Homero Day, necessitating the use of an emergency medication from her suitcase. She was provided with a trial pack of Trelegy by her allergist, containing 14 puffs, and subsequently received another pack from her pulmonologist. Her last consultation with Dr. Kang was some time ago. She has a history of asthma, which she believes developed at the age of 33 following an insect bite in [facility]. She recalls waking up with a large, red, painful mass under her skin, and a similar lesion on her leg. Due to travel commitments, she was unable to seek immediate medical attention. On 07/15/2013, while staying at a hotel, she experienced severe chest pressure, akin to an elephant sitting on her chest. By 07/17/2013, she developed flu-like symptoms, including a cough. On 07/19/2013, while at a movie theater in [place], she noticed a loud whistling sound emanating from her chest. She attempted self-treatment with over-the-counter medications such as Mucinex and DayQuil, but her condition deteriorated, leading to complete respiratory failure. She was admitted to the [facility], where a chest x-ray was performed, and she received two treatments of albuterol, as she did not respond to the initial dose. She was also prescribed an inhaler for emphysema. She has since discontinued the use  of other inhalers, particularly albuterol, which she reports causes chest inflammation and mucus production, leading to breathing difficulties. She has found Xopenex to be effective, but notes that its efficacy diminishes over time. She expresses concern about the potential carcinogenic effects of prednisone. She has consulted various pulmonologists across the country, from [place] to [place], and has undergone a 3D CT scan of her chest, which did not reveal any abnormalities. She has also been evaluated in a breathing chamber and had her oxygen levels tested, but these interventions have not improved her symptoms.    She expresses concern about a potential heart murmur, as her physician detected an abnormality in her heartbeat during a recent physical examination. She reports that her blood pressure is not consistently controlled, with occasional elevations, particularly during periods of stress.    She also reports chronic stress and has been under the care of a psychiatrist, although she has missed several appointments due to relocation. She is scheduled to start training at a hospital next week.    She was previously prescribed Seroquel for weight management issues, but discontinued it due to associated weight gain. She was also placed on steroids following a bone graft procedure, which further contributed to her weight gain. She reports that her weight gain is primarily localized to her abdomen, causing significant discomfort. She is reluctant to initiate any new medications that may exacerbate her weight gain or hinder weight loss. She is currently on Lexapro, Seroquel, and doxepin, and has expressed concerns about their potential impact on her weight. She is considering consulting a bariatric surgeon for potential surgical intervention.    She has a diagnosis of hidradenitis suppurativa and reports excessive drainage from the area under her stomach. She has been managing this with paper  towels.    ALLERGIES  The patient has no known allergies.    MEDICATIONS  Current: Trelegy, Lexapro, Seroquel, doxepin  Past: albuterol, Xopenex        No problems updated.    ROS  See HPI     Allergies   Allergen Reactions    Bloodless Unspecified     Pt requesting bloodless protocol    [place] Oil Hives and Rash     Other reaction(s): Rash, urticarial      Cat Hair Extract Itching    Kiwi Extract      Other reaction(s): Lip Swellen  Other reaction(s): Lip Swellen         Current medicines (including changes today)  Current Outpatient Medications   Medication Sig Dispense Refill    levalbuterol (XOPENEX HFA) 45 MCG/ACT inhaler Inhale 1 puff by mouth every 6 hours as needed 30 days 15 g 0    methylPREDNISolone (MEDROL DOSEPAK) 4 MG Tablet Therapy Pack Take 6 Tablets by mouth. on day 1 decrease by 1 tablet for 5 days per dose pack instructions 21 Tablet 0    chlorhexidine (PERIDEX) 0.12 % Solution Rinse 1 capful twice daily, rinse for 30 seconds morning and evening after brushing teeth 473 mL 3    ibuprofen (MOTRIN) 600 MG Tab Take 1 Tablet by mouth every 6-8 hours as needed for pain. 40 Tablet 0    amoxicillin-clavulanate (AUGMENTIN) 875-125 MG Tab Take 1 Tablet by mouth every 12 hours as needed. 20 Tablet 0    escitalopram (LEXAPRO) 10 MG Tab Take 1 Tablet every day for depression and anxiety 30 Tablet 0    lamoTRIgine (LAMICTAL) 25 MG Tab Take 2 Tablets daily. Restart at 50mg once a day for mood stability 60 Tablet 0    propranolol (INDERAL) 10 MG Tab Take 1 Tablet 1-2 times daily for anxiety 60 Tablet 1    QUEtiapine (SEROQUEL) 50 MG tablet Take 1 Tablet once daily for mood/psychosis StartDate : 10- 30 Tablet 0    escitalopram (LEXAPRO) 10 MG Tab Take 1 Tablet by mouth every day. 30 Tablet 2    escitalopram (LEXAPRO) 10 MG Tab Take 1 Tablet by mouth daily for depression and anxiety. 30 Tablet 0    propranolol (INDERAL) 10 MG Tab Take 1 Tablet 1-2 times daily for anxiety StartDate : 09- 60 Tablet 1     traZODone (DESYREL) 100 MG Tab Take 1 Tablet  by mouth nightly as needed for sleep 30 Tablet 0    montelukast (SINGULAIR) 10 MG Tab Take 1 Tablet by mouth every day. 30 Tablet 5    escitalopram (LEXAPRO) 20 MG tablet Take 1 Tablet by mouth every day for anxiety and depression. 30 Tablet 1    nystatin (MYCOSTATIN) powder Apply 1 g topically 3 times a day. 60 g 0    chlorhexidine (HIBICLENS) 4 % solution Use to clean around areas of hydradenitis daily as needed 946 mL 3    doxepin (SINEQUAN) 25 MG Cap Take 1 capsule by mouth every bedtime as needed for insomnia 30 Capsule 0    ipratropium (ATROVENT) 0.03 % Solution Take 1 spray in each nostril up to 3 times a day as needed for nasal drip 30 mL 5    QVAR REDIHALER 80 MCG/ACT inhaler Inhale 2 Puffs 2 times a day. 10.6 g 5     No current facility-administered medications for this visit.       Social History     Tobacco Use    Smoking status: Never    Smokeless tobacco: Never   Vaping Use    Vaping status: Never Used   Substance Use Topics    Alcohol use: Yes     Comment: rarely    Drug use: Never       Patient Active Problem List    Diagnosis Date Noted    Spongiotic dermatitis 08/15/2024    Transgender 07/09/2024    Prediabetes 11/07/2023    Primary insomnia 09/28/2023    Anxiety 05/18/2023    Constipation 02/08/2023    Cutaneous candidiasis 02/08/2023    Early menopause occurring in patient age younger than 45 years 10/06/2022    History of pituitary macroadenoma (HCC) 08/11/2022    Moderate persistent asthma with acute exacerbation 06/30/2022    Eosinophilia 06/30/2022    Hidradenitis suppurativa 06/09/2022    Environmental allergies 06/09/2022    Gastroesophageal reflux disease without esophagitis 06/09/2022    Severe episode of recurrent major depressive disorder, without psychotic features (HCC) 06/09/2022    Primary hypertension 03/24/2022    Chronic sinusitis 03/09/2022    Pulmonary air trapping 03/09/2022    Iron deficiency anemia 03/09/2022    Class 3 severe  obesity due to excess calories with serious comorbidity and body mass index (BMI) of 50.0 to 59.9 in adult ([facility]) 08/12/2015       Family History   Problem Relation Age of Onset    Hypertension Mother     Diabetes Mother     Cancer Mother 40        thyroid    Stroke Mother     Hypertension Father     Breast Cancer Sister 48    Heart Disease Neg Hx           Objective:     /80 (BP Location: Right arm, Patient Position: Sitting, BP Cuff Size: Adult)   Pulse 87   Temp 36.6 °C (97.9 °F) (Temporal)   Resp 16   Wt (!) 186 kg (410 lb 3.2 oz)   SpO2 98%  Body mass index is 62.37 kg/m².    Physical Exam:  Physical Exam  Vitals reviewed.   Constitutional:       General: She is awake.      Appearance: Normal appearance. She is well-developed. She is obese.   HENT:      Head: Normocephalic.   Eyes:      Conjunctiva/sclera: Conjunctivae normal.   Cardiovascular:      Rate and Rhythm: Normal rate.   Pulmonary:      Effort: Pulmonary effort is normal. No respiratory distress.   Musculoskeletal:      Cervical back: Neck supple.   Skin:     General: Skin is warm and dry.   Neurological:      Mental Status: She is alert and oriented to person, place, and time.   Psychiatric:         Mood and Affect: Mood normal.         Behavior: Behavior normal. Behavior is cooperative.              Assessment and Plan:     The following treatment plan was discussed:    Problem List Items Addressed This Visit       Class 3 severe obesity due to excess calories with serious comorbidity and body mass index (BMI) of 50.0 to 59.9 in adult ([facility])    Relevant Orders    Referral to Bariatric Surgery     Other Visit Diagnoses       Moderate persistent asthma without complication        Relevant Medications    levalbuterol (XOPENEX HFA) 45 MCG/ACT inhaler            Assessment & Plan  1. Asthma.  Her respiratory distress could potentially be attributed to her obesity, which may be contributing to blood pressure and breathing issues. She has been  informed that long-term steroid use can lead to various complications. She will be transitioned to Xopenex, to be used as needed for wheezing and breathing difficulties. A prescription for Xopenex inhaler will be sent to her pharmacy. No acute exacerbation at this time       2. Weight gain.  Her current medications, including Lexapro, Seroquel, and doxepin, may be contributing to her weight gain. She has been advised to maintain an active lifestyle and make dietary modifications. A referral to a weight loss center will be made to discuss potential surgical interventions.    3. Hidradenitis suppurativa.  She will be provided with 4x4 gauze pads for wound care. Lotion samples will also be given to apply on the affected area. She is to follow up with her dermatologist         PROCEDURE  The patient underwent a bone graft procedure in the past.    Any change or worsening of signs or symptoms, patient encouraged to follow-up or report to emergency room for further evaluation. Patient verbalizes understanding and agrees.      PLEASE NOTE: This dictation was created using voice recognition software. I have made every reasonable attempt to correct obvious errors, but I expect that there are errors of grammar and possibly content that I did not discover before finalizing the note.

## 2025-01-15 RX ORDER — FLUTICASONE FUROATE, UMECLIDINIUM BROMIDE AND VILANTEROL TRIFENATATE 200; 62.5; 25 UG/1; UG/1; UG/1
1 POWDER RESPIRATORY (INHALATION) DAILY
COMMUNITY
Start: 2024-11-13 | End: 2025-01-24

## 2025-01-24 ENCOUNTER — PHARMACY VISIT (OUTPATIENT)
Dept: PHARMACY | Facility: MEDICAL CENTER | Age: 45
End: 2025-01-24
Payer: COMMERCIAL

## 2025-01-24 ENCOUNTER — OFFICE VISIT (OUTPATIENT)
Dept: MEDICAL GROUP | Facility: MEDICAL CENTER | Age: 45
End: 2025-01-24
Attending: INTERNAL MEDICINE
Payer: MEDICAID

## 2025-01-24 VITALS
HEART RATE: 101 BPM | DIASTOLIC BLOOD PRESSURE: 78 MMHG | TEMPERATURE: 97 F | BODY MASS INDEX: 43.4 KG/M2 | WEIGHT: 293 LBS | RESPIRATION RATE: 16 BRPM | OXYGEN SATURATION: 96 % | HEIGHT: 69 IN | SYSTOLIC BLOOD PRESSURE: 124 MMHG

## 2025-01-24 DIAGNOSIS — K21.9 GASTROESOPHAGEAL REFLUX DISEASE WITHOUT ESOPHAGITIS: ICD-10-CM

## 2025-01-24 DIAGNOSIS — J45.41 MODERATE PERSISTENT ASTHMA WITH ACUTE EXACERBATION: ICD-10-CM

## 2025-01-24 DIAGNOSIS — L73.2 HIDRADENITIS SUPPURATIVA: ICD-10-CM

## 2025-01-24 PROBLEM — I10 PRIMARY HYPERTENSION: Status: RESOLVED | Noted: 2022-03-24 | Resolved: 2025-01-24

## 2025-01-24 PROCEDURE — RXMED WILLOW AMBULATORY MEDICATION CHARGE: Performed by: INTERNAL MEDICINE

## 2025-01-24 PROCEDURE — 99214 OFFICE O/P EST MOD 30 MIN: CPT | Performed by: INTERNAL MEDICINE

## 2025-01-24 PROCEDURE — 99213 OFFICE O/P EST LOW 20 MIN: CPT | Performed by: INTERNAL MEDICINE

## 2025-01-24 PROCEDURE — 3078F DIAST BP <80 MM HG: CPT | Performed by: INTERNAL MEDICINE

## 2025-01-24 PROCEDURE — 3074F SYST BP LT 130 MM HG: CPT | Performed by: INTERNAL MEDICINE

## 2025-01-24 RX ORDER — OMEPRAZOLE 20 MG/1
20 TABLET, DELAYED RELEASE ORAL DAILY
Qty: 30 TABLET | Refills: 0 | Status: SHIPPED | OUTPATIENT
Start: 2025-01-24

## 2025-01-24 RX ORDER — SULFAMETHOXAZOLE AND TRIMETHOPRIM 800; 160 MG/1; MG/1
1 TABLET ORAL 2 TIMES DAILY
Qty: 14 TABLET | Refills: 0 | Status: SHIPPED | OUTPATIENT
Start: 2025-01-24 | End: 2025-01-31

## 2025-01-24 RX ORDER — BUDESONIDE AND FORMOTEROL FUMARATE DIHYDRATE 160; 4.5 UG/1; UG/1
2 AEROSOL RESPIRATORY (INHALATION) 2 TIMES DAILY
Qty: 10.2 G | Refills: 0 | Status: SHIPPED | OUTPATIENT
Start: 2025-01-24

## 2025-01-24 NOTE — LETTER
January 24, 2025    To Whom It May Concern:         Ernesto Ramos is currently a patient under my care at the [facility].  She will be moving to [place] near future.  She currently suffers from the following medical conditions:     Hydradenitis Suppurativa  Major depression, severe  Severe asthma (uncontrolled)  Iron deficiency anemia  History of pituitary ademoma  Anxiety  Multiple joint pain (uncertain etiology)    Due to these conditions which cause difficulty walking and difficulty with prolonged standing, she is unable to take regular public transportation.        Leticia Houser M.D.  [phone]

## 2025-01-25 NOTE — ASSESSMENT & PLAN NOTE
She reports GERD symptoms have been worsening lately since she has been taking supplemental iron after being diagnosed with iron deficiency anemia.  She is requesting an antacid.  She reports some nocturnal awakenings with acid reflux.

## 2025-01-25 NOTE — ASSESSMENT & PLAN NOTE
She has a significant flareup of her hidradenitis in the suprapubic area beneath her pannus.  She has multiple open lesions and tracks.  She reports drainage which is greenish and purulent from some of the lesions and that she is in increased pain.  She is requesting an antibiotic.  She is not currently following with dermatology however she was tried on a biologic in the past which she did not tolerate.  She will be moving to [place] in 1 week and plans to establish with a dermatologist immediately.

## 2025-01-25 NOTE — PROGRESS NOTES
Subjective:   Ernesto Ramos is a 44 y.o. female here today for multiple concerns    Gastroesophageal reflux disease without esophagitis  She reports GERD symptoms have been worsening lately since she has been taking supplemental iron after being diagnosed with iron deficiency anemia.  She is requesting an antacid.  She reports some nocturnal awakenings with acid reflux.    Hidradenitis suppurativa  She has a significant flareup of her hidradenitis in the suprapubic area beneath her pannus.  She has multiple open lesions and tracks.  She reports drainage which is greenish and purulent from some of the lesions and that she is in increased pain.  She is requesting an antibiotic.  She is not currently following with dermatology however she was tried on a biologic in the past which she did not tolerate.  She will be moving to [place] in 1 week and plans to establish with a dermatologist immediately.    Moderate persistent asthma with acute exacerbation  She continues to report difficulty with breathing.  She has been taking Trelegy which is given to her by her allergist but she reports having lots of muscle and joint pain when she is taking it.  She is using it every other day because it does help with her breathing.  She took Advair in the past and she had the same symptoms which resolved when she discontinued it.  She would like to try an alternative inhaler.  She did well on Qvar but eventually states it stopped working.  She is also been trying her Xopenex but has not noticed much improvement with it.       Current medicines (including changes today)  Current Outpatient Medications   Medication Sig Dispense Refill    omeprazole (PRILOSEC OTC) 20 MG tablet Take 1 tablet by mouth every day. 30 Tablet 0    sulfamethoxazole-trimethoprim (BACTRIM DS) 800-160 MG tablet Take 1 Tablet by mouth 2 times a day for 7 days. 14 Tablet 0    budesonide-formoterol (SYMBICORT) 160-4.5 MCG/ACT Aerosol Inhale 2 Puffs 2 times a  day. 10.2 g 0    levalbuterol (XOPENEX HFA) 45 MCG/ACT inhaler Inhale 1 puff by mouth every 6 hours as needed 30 days 15 g 0    methylPREDNISolone (MEDROL DOSEPAK) 4 MG Tablet Therapy Pack Take 6 Tablets by mouth. on day 1 decrease by 1 tablet for 5 days per dose pack instructions 21 Tablet 0    chlorhexidine (PERIDEX) 0.12 % Solution Rinse 1 capful twice daily, rinse for 30 seconds morning and evening after brushing teeth 473 mL 3    ibuprofen (MOTRIN) 600 MG Tab Take 1 Tablet by mouth every 6-8 hours as needed for pain. 40 Tablet 0    amoxicillin-clavulanate (AUGMENTIN) 875-125 MG Tab Take 1 Tablet by mouth every 12 hours as needed. 20 Tablet 0    escitalopram (LEXAPRO) 10 MG Tab Take 1 Tablet every day for depression and anxiety 30 Tablet 0    lamoTRIgine (LAMICTAL) 25 MG Tab Take 2 Tablets daily. Restart at 50mg once a day for mood stability 60 Tablet 0    propranolol (INDERAL) 10 MG Tab Take 1 Tablet 1-2 times daily for anxiety 60 Tablet 1    QUEtiapine (SEROQUEL) 50 MG tablet Take 1 Tablet once daily for mood/psychosis StartDate : 10- 30 Tablet 0    escitalopram (LEXAPRO) 10 MG Tab Take 1 Tablet by mouth every day. 30 Tablet 2    escitalopram (LEXAPRO) 10 MG Tab Take 1 Tablet by mouth daily for depression and anxiety. 30 Tablet 0    propranolol (INDERAL) 10 MG Tab Take 1 Tablet 1-2 times daily for anxiety StartDate : 09- 60 Tablet 1    traZODone (DESYREL) 100 MG Tab Take 1 Tablet  by mouth nightly as needed for sleep 30 Tablet 0    montelukast (SINGULAIR) 10 MG Tab Take 1 Tablet by mouth every day. 30 Tablet 5    escitalopram (LEXAPRO) 20 MG tablet Take 1 Tablet by mouth every day for anxiety and depression. 30 Tablet 1    nystatin (MYCOSTATIN) powder Apply 1 g topically 3 times a day. 60 g 0    chlorhexidine (HIBICLENS) 4 % solution Use to clean around areas of hydradenitis daily as needed 946 mL 3    doxepin (SINEQUAN) 25 MG Cap Take 1 capsule by mouth every bedtime as needed for insomnia 30  Capsule 0    ipratropium (ATROVENT) 0.03 % Solution Take 1 spray in each nostril up to 3 times a day as needed for nasal drip 30 mL 5     No current facility-administered medications for this visit.     She  has a past medical history of Allergy, Anemia (2018), Anesthesia (07/28/2022), Asthma (07/28/2022), Bowel habit changes (07/28/2022), Breath shortness (07/28/2022), Chronic sinusitis, Disorder of thyroid (07/28/2022), GERD (gastroesophageal reflux disease), Head ache (07/28/2022), Heart burn (07/28/2022), Hydradenitis, Hydradenitis, Obesity, Psychiatric problem (07/28/2022), and Snoring (07/28/2022).         Objective:     Vitals:    01/24/25 1500   BP: 124/78   Pulse: (!) 101   Resp: 16   Temp: 36.1 °C (97 °F)   SpO2: 96%     Body mass index is 61.85 kg/m².   Physical Exam:  Constitutional: Alert, no distress.  Skin: Warm, dry, good turgor, multiple scarred areas beneath pannus with several ulcerations/superficial openings with blood, significant tenderness to palpation in these areas, tracking present  Eye: Equal, round and reactive, conjunctiva clear, lids normal.  Psych: Alert and oriented x3, normal affect and mood.    Assessment and Plan:   The following treatment plan was discussed    1. Gastroesophageal reflux disease without esophagitis  Will trial on omeprazole as below.  She is aware that she will need to follow-up with a new primary care when she moves to [place] for continued management  - omeprazole (PRILOSEC OTC) 20 MG tablet; Take 1 tablet by mouth every day.  Dispense: 30 Tablet; Refill: 0    2. Hidradenitis suppurativa  Uncontrolled.  Given the severity of her symptoms, I think she would benefit from trial of an alternative biologic.  She will be scheduling with a dermatologist as soon as she moves to [place].  Difficult to determine if she has an infection today but may have an early infection and given that she is moving, we elected to treat with 1 week of Bactrim to help control her  symptoms.  - sulfamethoxazole-trimethoprim (BACTRIM DS) 800-160 MG tablet; Take 1 Tablet by mouth 2 times a day for 7 days.  Dispense: 14 Tablet; Refill: 0    3. Moderate persistent asthma with acute exacerbation  She feels like her muscle and joint pains are related to the Trelegy.  We discussed that there is a common ingredient in Trelegy and Advair and reasonable to try her on Symbicort.  Again, she will need to establish with a new primary and pulmonologist when she gets to [place].  - budesonide-formoterol (SYMBICORT) 160-4.5 MCG/ACT Aerosol; Inhale 2 Puffs 2 times a day.  Dispense: 10.2 g; Refill: 0    Additionally, she requested a letter to be written discussing her current health issues which prevent her from taking public transportation so that she can get established with [facility] when she moves to [facility].    Followup: Return if symptoms worsen or fail to improve.

## 2025-01-25 NOTE — ASSESSMENT & PLAN NOTE
She continues to report difficulty with breathing.  She has been taking Trelegy which is given to her by her allergist but she reports having lots of muscle and joint pain when she is taking it.  She is using it every other day because it does help with her breathing.  She took Advair in the past and she had the same symptoms which resolved when she discontinued it.  She would like to try an alternative inhaler.  She did well on Qvar but eventually states it stopped working.  She is also been trying her Xopenex but has not noticed much improvement with it.

## 2025-01-31 ENCOUNTER — OFFICE VISIT (OUTPATIENT)
Dept: MEDICAL GROUP | Facility: MEDICAL CENTER | Age: 45
End: 2025-01-31
Attending: INTERNAL MEDICINE
Payer: MEDICAID

## 2025-01-31 ENCOUNTER — PHARMACY VISIT (OUTPATIENT)
Dept: PHARMACY | Facility: MEDICAL CENTER | Age: 45
End: 2025-01-31
Payer: COMMERCIAL

## 2025-01-31 VITALS
BODY MASS INDEX: 43.4 KG/M2 | SYSTOLIC BLOOD PRESSURE: 126 MMHG | HEART RATE: 93 BPM | DIASTOLIC BLOOD PRESSURE: 74 MMHG | OXYGEN SATURATION: 98 % | WEIGHT: 293 LBS | RESPIRATION RATE: 16 BRPM | HEIGHT: 69 IN | TEMPERATURE: 97.7 F

## 2025-01-31 DIAGNOSIS — J45.41 MODERATE PERSISTENT ASTHMA WITH ACUTE EXACERBATION: ICD-10-CM

## 2025-01-31 PROCEDURE — 99213 OFFICE O/P EST LOW 20 MIN: CPT | Performed by: INTERNAL MEDICINE

## 2025-01-31 PROCEDURE — RXMED WILLOW AMBULATORY MEDICATION CHARGE: Performed by: INTERNAL MEDICINE

## 2025-01-31 RX ORDER — METHYLPREDNISOLONE 4 MG/1
TABLET ORAL
Qty: 21 TABLET | Refills: 0 | Status: SHIPPED | OUTPATIENT
Start: 2025-01-31

## 2025-02-01 NOTE — ASSESSMENT & PLAN NOTE
She reports that for the past week or so she has noticed increased wheezing and like her inhaler is really not helping at all.  Her lungs feel very tight as she is having increased dyspnea with exertion.  She is requesting a prescription for a Medrol Dosepak as she will be leaving for [place] this coming Sunday, has a lot of things to accomplish when she gets there, and would like to have her shortness of breath under better control.

## 2025-02-01 NOTE — PROGRESS NOTES
Subjective:   Ernesto Ramos is a 44 y.o. female here today for breathing    Moderate persistent asthma with acute exacerbation  She reports that for the past week or so she has noticed increased wheezing and like her inhaler is really not helping at all.  Her lungs feel very tight as she is having increased dyspnea with exertion.  She is requesting a prescription for a Medrol Dosepak as she will be leaving for [place] this coming Sunday, has a lot of things to accomplish when she gets there, and would like to have her shortness of breath under better control.       Current medicines (including changes today)  Current Outpatient Medications   Medication Sig Dispense Refill    methylPREDNISolone (MEDROL DOSEPAK) 4 MG Tablet Therapy Pack Take according to package instructions 21 Tablet 0    omeprazole (PRILOSEC OTC) 20 MG tablet Take 1 tablet by mouth every day. 30 Tablet 0    sulfamethoxazole-trimethoprim (BACTRIM DS) 800-160 MG tablet Take 1 Tablet by mouth 2 times a day for 7 days. 14 Tablet 0    budesonide-formoterol (SYMBICORT) 160-4.5 MCG/ACT Aerosol Inhale 2 Puffs 2 times a day. 10.2 g 0    levalbuterol (XOPENEX HFA) 45 MCG/ACT inhaler Inhale 1 puff by mouth every 6 hours as needed 30 days 15 g 0    chlorhexidine (PERIDEX) 0.12 % Solution Rinse 1 capful twice daily, rinse for 30 seconds morning and evening after brushing teeth 473 mL 3    ibuprofen (MOTRIN) 600 MG Tab Take 1 Tablet by mouth every 6-8 hours as needed for pain. 40 Tablet 0    amoxicillin-clavulanate (AUGMENTIN) 875-125 MG Tab Take 1 Tablet by mouth every 12 hours as needed. 20 Tablet 0    escitalopram (LEXAPRO) 10 MG Tab Take 1 Tablet every day for depression and anxiety 30 Tablet 0    lamoTRIgine (LAMICTAL) 25 MG Tab Take 2 Tablets daily. Restart at 50mg once a day for mood stability 60 Tablet 0    propranolol (INDERAL) 10 MG Tab Take 1 Tablet 1-2 times daily for anxiety 60 Tablet 1    QUEtiapine (SEROQUEL) 50 MG tablet Take 1 Tablet once  daily for mood/psychosis StartDate : 10- 30 Tablet 0    escitalopram (LEXAPRO) 10 MG Tab Take 1 Tablet by mouth every day. 30 Tablet 2    escitalopram (LEXAPRO) 10 MG Tab Take 1 Tablet by mouth daily for depression and anxiety. 30 Tablet 0    propranolol (INDERAL) 10 MG Tab Take 1 Tablet 1-2 times daily for anxiety StartDate : 09- 60 Tablet 1    traZODone (DESYREL) 100 MG Tab Take 1 Tablet  by mouth nightly as needed for sleep 30 Tablet 0    montelukast (SINGULAIR) 10 MG Tab Take 1 Tablet by mouth every day. 30 Tablet 5    escitalopram (LEXAPRO) 20 MG tablet Take 1 Tablet by mouth every day for anxiety and depression. 30 Tablet 1    nystatin (MYCOSTATIN) powder Apply 1 g topically 3 times a day. 60 g 0    chlorhexidine (HIBICLENS) 4 % solution Use to clean around areas of hydradenitis daily as needed 946 mL 3    doxepin (SINEQUAN) 25 MG Cap Take 1 capsule by mouth every bedtime as needed for insomnia 30 Capsule 0    ipratropium (ATROVENT) 0.03 % Solution Take 1 spray in each nostril up to 3 times a day as needed for nasal drip 30 mL 5     No current facility-administered medications for this visit.     She  has a past medical history of Allergy, Anemia (2018), Anesthesia (07/28/2022), Asthma (07/28/2022), Bowel habit changes (07/28/2022), Breath shortness (07/28/2022), Chronic sinusitis, Disorder of thyroid (07/28/2022), GERD (gastroesophageal reflux disease), Head ache (07/28/2022), Heart burn (07/28/2022), Hydradenitis, Hydradenitis, Obesity, Psychiatric problem (07/28/2022), and Snoring (07/28/2022).         Objective:     Vitals:    01/31/25 1453   BP: 126/74   Pulse: 93   Resp: 16   Temp: 36.5 °C (97.7 °F)   SpO2: 98%     Body mass index is 60.86 kg/m².   Physical Exam:  Constitutional: Alert, no distress.  Skin: Warm, dry, good turgor, no rashes in visible areas.  Eye: Equal, round and reactive, conjunctiva clear, lids normal.  ENMT: Lips without lesions, good dentition, oropharynx clear, TM's  clear bilaterally  Neck: Trachea midline, no masses, no thyromegaly. No cervical or supraclavicular lymphadenopathy  Respiratory: Unlabored respiratory effort, diffuse wheezing in all lung fields      Assessment and Plan:   The following treatment plan was discussed    1. Moderate persistent asthma with acute exacerbation  She is quite wheezy on exam today, more so than her baseline.  I have prescribed a Medrol Dosepak for her to take.  She is aware that when she moves to [place] she will need to get established with a primary care provider as well as a pulmonologist to better help manage her asthma.  - methylPREDNISolone (MEDROL DOSEPAK) 4 MG Tablet Therapy Pack; Take according to package instructions  Dispense: 21 Tablet; Refill: 0  -cont trelegy      Followup: Return if symptoms worsen or fail to improve.

## 2025-03-05 ENCOUNTER — TELEPHONE (OUTPATIENT)
Dept: HEALTH INFORMATION MANAGEMENT | Facility: OTHER | Age: 45
End: 2025-03-05
Payer: MEDICAID

## 2025-03-12 ENCOUNTER — PATIENT MESSAGE (OUTPATIENT)
Dept: MEDICAL GROUP | Facility: MEDICAL CENTER | Age: 45
End: 2025-03-12
Payer: MEDICAID

## 2025-03-12 DIAGNOSIS — L73.2 HIDRADENITIS SUPPURATIVA: ICD-10-CM

## 2025-03-14 NOTE — Clinical Note
REFERRAL APPROVAL NOTICE         Sent on March 14, 2025                   Ernesto Ramos  [address]    [address]  [place] [address]                   Dear Ms. Ramos,    After a careful review of the medical information and benefit coverage, [facility] has processed your referral. See below for additional details.    If applicable, you must be actively enrolled with your insurance for coverage of the authorized service. If you have any questions regarding your coverage, please contact your insurance directly.    REFERRAL INFORMATION   Referral #:  [id number]  Referred-To Provider    Referred-By Provider:  Dermatology    Leticia Houser M.D.   [facility] [place] [place] [facility]ERMAT[facility]      [address]  [place] [address]  [phone] [address] [place] [address]  [phone]    Referral Start Date:  03/12/2025  Referral End Date:   03/12/2026             SCHEDULING  If you do not already have an appointment, please call [phone] to make an appointment.     MORE INFORMATION  If you do not already have a [facility] account, sign up at: [email or web address]  You can access your medical information, make appointments, see lab results, billing information, and more.  If you have questions regarding this referral, please contact  the [facility] Referrals department at:             [phone]. Monday - Friday 8:00AM - 5:00PM.     Sincerely,    [facility]

## (undated) DEVICE — GLOVE SZ 6.5 BIOGEL PI MICRO - PF LF (50PR/BX)

## (undated) DEVICE — GLOVE PROTEXIS PI MICRO SZ 8.5 (200PR/CA)

## (undated) DEVICE — SODIUM CHL IRRIGATION 0.9% 1000ML (12EA/CA)

## (undated) DEVICE — BLADE INFERIOR TURBINATE 2.9MM (5EA/PK)

## (undated) DEVICE — CATHETER IV 20 GA X 1-1/4 ---SURG.& SDS ONLY--- (50EA/BX)

## (undated) DEVICE — CATHETER DRN 35CM 2.9MM 1.6MM LG HERMETIC LARGE-STYLE - SUB ITEM USE #4384

## (undated) DEVICE — WATER IRRIGATION STERILE 1000ML (12EA/CA)

## (undated) DEVICE — KNIFE BACKCUTTING STRAIGHT 8.0IN HANDLE (MINIMUM ORDER QTY MUST BE 5)

## (undated) DEVICE — GVL 3 STAT DISPOSABLE - (10/BX)

## (undated) DEVICE — Device

## (undated) DEVICE — SHEET PEDIATRIC LAPAROTOMY - (10/CA)

## (undated) DEVICE — LACTATED RINGERS INJ. 500 ML - (24EA/CA)

## (undated) DEVICE — BLADE STRAIGHT SINUS (5EA/PK)

## (undated) DEVICE — SUCTION INSTRUMENT YANKAUER BULBOUS TIP W/O VENT (50EA/CA)

## (undated) DEVICE — ANTI-FOG SOLUTION - 60BTL/CA

## (undated) DEVICE — MAT PATIENT POSITIONING PREVALON (10EA/CA)

## (undated) DEVICE — SHEATH SHARP SITE 30 DEGREE ENDOSCRUB II (5EA/PK)

## (undated) DEVICE — SUTURE 4-0 PLAIN GUT SC-1 ETHICON (36PK/BX)

## (undated) DEVICE — SLEEVE, VASO, THIGH, MED

## (undated) DEVICE — LACTATED RINGERS INJ 1000 ML - (14EA/CA 60CA/PF)

## (undated) DEVICE — TUBE CONNECTING SUCTION - CLEAR PLASTIC STERILE 72 IN (50EA/CA)

## (undated) DEVICE — SUTURE 5-0 PLAIN GUT PC-1 - (12/BX)

## (undated) DEVICE — SENSOR OXIMETER ADULT SPO2 RD SET (20EA/BX)

## (undated) DEVICE — DRAPE SURGICAL U 77X120 - (10/CA)

## (undated) DEVICE — SET EXTENSION WITH 2 PORTS (48EA/CA) ***PART #2C8610 IS A SUBSTITUTE*****

## (undated) DEVICE — DURAL SEALANT

## (undated) DEVICE — DRAPE STRLE REG TOWEL 18X24 - (10/BX 4BX/CA)"

## (undated) DEVICE — GLOVE BIOGEL INDICATOR SZ 8 SURGICAL PF LTX - (50/BX 4BX/CA)

## (undated) DEVICE — TRACKER ENT PATIENT

## (undated) DEVICE — GLOVE BIOGEL PI ORTHO SZ 6 SURGICAL PF LF (40PR/BX)

## (undated) DEVICE — ELECTRODE DUAL RETURN W/ CORD - (50/PK)

## (undated) DEVICE — TUBING ENDOSCRUB II (5EA/PK)

## (undated) DEVICE — MEDICINE CUP STERILE 2 OZ - (100/CA)

## (undated) DEVICE — FORCEPS IRRIGATING 8 X 1.5MM (5EA/BX)

## (undated) DEVICE — SWAB CULTURE AMIES ESWAB (50EA/PK)

## (undated) DEVICE — PACK NEURO - (2EA/CA)

## (undated) DEVICE — SLEEVE VASO CALF MED - (10PR/CA)

## (undated) DEVICE — PATTIES SURG X-RAYCOTTONOID - 1/2 X 3 IN (200/CA)

## (undated) DEVICE — TRAY SURESTEP FOLEY TEMP SENSING 16FR (10EA/CA) ORDER  #18764 FOR TEMP FOLEY ONLY

## (undated) DEVICE — CONTAINER SPECIMEN BAG OR - STERILE 4 OZ W/LID (100EA/CA)

## (undated) DEVICE — TUBE CONNECT SUCTION CLEAR 120 X 1/4" (50EA/CA)"

## (undated) DEVICE — TRACKER ENT INSTRUMENT

## (undated) DEVICE — GOWN WARMING STANDARD FLEX - (30/CA)

## (undated) DEVICE — TUBING CLEARLINK DUO-VENT - C-FLO (48EA/CA)

## (undated) DEVICE — GOWN SURGEONS LARGE - (32/CA)

## (undated) DEVICE — GLOVE BIOGEL PI INDICATOR SZ 6.0 SURGICAL PF LF -(200PR/CA)

## (undated) DEVICE — CATHETER IV ANGIO 20GA X 2IN - ANGIOCATH (50/BX)

## (undated) DEVICE — SURGIFOAM (12X7) - (12EA/CA)

## (undated) DEVICE — SUCTION 9 FR MALLEABLE STANDARD TIP

## (undated) DEVICE — KIT SURGIFLO W/OUT THROMBIN - (6EA/CA)

## (undated) DEVICE — TOWEL STOP TIMEOUT SAFETY FLAG (40EA/CA)

## (undated) DEVICE — BOVIE FOOT CONTROL SUCTION - 6IN 10FR (25EA/CA)

## (undated) DEVICE — TOWELS CLOTH SURGICAL - (4/PK 20PK/CA)

## (undated) DEVICE — SODIUM CHL. INJ. 0.9% 500ML (24EA/CA 50CA/PF)

## (undated) DEVICE — CATHETER IV ANGIOCATH NON-SAFETY DAVINCI 14GA X 5.25 (10/BX)

## (undated) DEVICE — SET LEADWIRE 5 LEAD BEDSIDE DISPOSABLE ECG (1SET OF 5/EA)

## (undated) DEVICE — TRANSDUCER ADULT DISP. SINGLE BONDED STERILE - (20EA/CA)

## (undated) DEVICE — SYRINGE 10 ML CONTROL LL (25EA/BX 4BX/CA)

## (undated) DEVICE — GLOVE BIOGEL SZ 8 SURGICAL PF LTX - (50PR/BX 4BX/CA)

## (undated) DEVICE — CANISTER SUCTION 3000ML MECHANICAL FILTER AUTO SHUTOFF MEDI-VAC NONSTERILE LF DISP  (40EA/CA)

## (undated) DEVICE — SET, EXTENTION IV W/ TWIN SITE

## (undated) DEVICE — POINTER TRACKER 1-PACK

## (undated) DEVICE — CANISTER SUCTION RIGID RED 1500CC (40EA/CA)

## (undated) DEVICE — CORDS BIPOLAR COAGULATION - 12FT STERILE DISP. (10EA/BX)

## (undated) DEVICE — SUTURE GENERAL

## (undated) DEVICE — TRAY CATHETER FOLEY URINE METER W/STATLOCK 350ML (10EA/CA)

## (undated) DEVICE — GLOVE BIOGEL PI INDICATOR SZ 6.5 SURGICAL PF LF - (50/BX 4BX/CA)

## (undated) DEVICE — HEMOSTAT SURG ABSORBABLE - 2 X 3 IN SURGICEL (24EA/CA)

## (undated) DEVICE — DRAIN CSF WITH ANTI REFLUX VALVE